# Patient Record
Sex: FEMALE | Race: WHITE | Employment: OTHER | URBAN - METROPOLITAN AREA
[De-identification: names, ages, dates, MRNs, and addresses within clinical notes are randomized per-mention and may not be internally consistent; named-entity substitution may affect disease eponyms.]

---

## 2019-09-07 ENCOUNTER — APPOINTMENT (OUTPATIENT)
Dept: GENERAL RADIOLOGY | Age: 74
DRG: 556 | End: 2019-09-07
Payer: MEDICARE

## 2019-09-07 ENCOUNTER — APPOINTMENT (OUTPATIENT)
Dept: CT IMAGING | Age: 74
DRG: 556 | End: 2019-09-07
Payer: MEDICARE

## 2019-09-07 ENCOUNTER — HOSPITAL ENCOUNTER (INPATIENT)
Age: 74
LOS: 3 days | Discharge: NURSING, REHAB OR LONG-TERM CARE | DRG: 556 | End: 2019-09-10
Attending: EMERGENCY MEDICINE | Admitting: INTERNAL MEDICINE
Payer: MEDICARE
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
## 2019-09-07 DIAGNOSIS — N39.0 URINARY TRACT INFECTION WITHOUT HEMATURIA, SITE UNSPECIFIED: ICD-10-CM

## 2019-09-07 DIAGNOSIS — M25.551 RIGHT HIP PAIN: ICD-10-CM
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
## 2019-09-07 LAB
A/G RATIO: 1.2 (ref 1.1–2.2)
ALBUMIN SERPL-MCNC: 4.4 G/DL (ref 3.4–5)
ALP BLD-CCNC: 109 U/L (ref 40–129)
ALT SERPL-CCNC: 12 U/L (ref 10–40)
ANION GAP SERPL CALCULATED.3IONS-SCNC: 10 MMOL/L (ref 3–16)
AST SERPL-CCNC: 13 U/L (ref 15–37)
BASOPHILS ABSOLUTE: 0.1 K/UL (ref 0–0.2)
BASOPHILS RELATIVE PERCENT: 0.7 %
BILIRUB SERPL-MCNC: 0.4 MG/DL (ref 0–1)
BILIRUBIN URINE: ABNORMAL
BLOOD, URINE: ABNORMAL
BUN BLDV-MCNC: 17 MG/DL (ref 7–20)
CALCIUM SERPL-MCNC: 9.9 MG/DL (ref 8.3–10.6)
CHLORIDE BLD-SCNC: 101 MMOL/L (ref 99–110)
CLARITY: ABNORMAL
CO2: 28 MMOL/L (ref 21–32)
COLOR: ABNORMAL
COMMENT UA: ABNORMAL
CREAT SERPL-MCNC: 0.8 MG/DL (ref 0.6–1.2)
EOSINOPHILS ABSOLUTE: 0 K/UL (ref 0–0.6)
EOSINOPHILS RELATIVE PERCENT: 0.4 %
GFR AFRICAN AMERICAN: >60
GFR NON-AFRICAN AMERICAN: >60
GLOBULIN: 3.6 G/DL
GLUCOSE BLD-MCNC: 138 MG/DL (ref 70–99)
GLUCOSE URINE: 100 MG/DL
HCT VFR BLD CALC: 39 % (ref 36–48)
HEMOGLOBIN: 13 G/DL (ref 12–16)
KETONES, URINE: 15 MG/DL
LEUKOCYTE ESTERASE, URINE: ABNORMAL
LYMPHOCYTES ABSOLUTE: 1 K/UL (ref 1–5.1)
LYMPHOCYTES RELATIVE PERCENT: 11.6 %
MCH RBC QN AUTO: 31.2 PG (ref 26–34)
MCHC RBC AUTO-ENTMCNC: 33.3 G/DL (ref 31–36)
MCV RBC AUTO: 93.6 FL (ref 80–100)
MICROSCOPIC EXAMINATION: YES
MONOCYTES ABSOLUTE: 0.4 K/UL (ref 0–1.3)
MONOCYTES RELATIVE PERCENT: 4.2 %
NEUTROPHILS ABSOLUTE: 7.2 K/UL (ref 1.7–7.7)
NEUTROPHILS RELATIVE PERCENT: 83.1 %
NITRITE, URINE: POSITIVE
PDW BLD-RTO: 14.1 % (ref 12.4–15.4)
PH UA: 6.5 (ref 5–8)
PLATELET # BLD: 239 K/UL (ref 135–450)
PMV BLD AUTO: 8.3 FL (ref 5–10.5)
POTASSIUM SERPL-SCNC: 4 MMOL/L (ref 3.5–5.1)
PROTEIN UA: >=300 MG/DL
RBC # BLD: 4.17 M/UL (ref 4–5.2)
RBC UA: >100 /HPF (ref 0–2)
SODIUM BLD-SCNC: 139 MMOL/L (ref 136–145)
SPECIFIC GRAVITY UA: 1.01 (ref 1–1.03)
TOTAL PROTEIN: 8 G/DL (ref 6.4–8.2)
URINE TYPE: ABNORMAL
UROBILINOGEN, URINE: 4 E.U./DL
WBC # BLD: 8.7 K/UL (ref 4–11)
WBC UA: ABNORMAL /HPF (ref 0–5)

## 2019-09-07 PROCEDURE — 6370000000 HC RX 637 (ALT 250 FOR IP): Performed by: INTERNAL MEDICINE

## 2019-09-07 PROCEDURE — 99285 EMERGENCY DEPT VISIT HI MDM: CPT

## 2019-09-07 PROCEDURE — 1200000000 HC SEMI PRIVATE

## 2019-09-07 PROCEDURE — 2580000003 HC RX 258: Performed by: EMERGENCY MEDICINE

## 2019-09-07 PROCEDURE — 96365 THER/PROPH/DIAG IV INF INIT: CPT

## 2019-09-07 PROCEDURE — 80053 COMPREHEN METABOLIC PANEL: CPT

## 2019-09-07 PROCEDURE — 6370000000 HC RX 637 (ALT 250 FOR IP): Performed by: EMERGENCY MEDICINE

## 2019-09-07 PROCEDURE — 73502 X-RAY EXAM HIP UNI 2-3 VIEWS: CPT

## 2019-09-07 PROCEDURE — 85025 COMPLETE CBC W/AUTO DIFF WBC: CPT

## 2019-09-07 PROCEDURE — 6360000002 HC RX W HCPCS: Performed by: EMERGENCY MEDICINE

## 2019-09-07 PROCEDURE — 71045 X-RAY EXAM CHEST 1 VIEW: CPT

## 2019-09-07 PROCEDURE — 71046 X-RAY EXAM CHEST 2 VIEWS: CPT

## 2019-09-07 PROCEDURE — 74176 CT ABD & PELVIS W/O CONTRAST: CPT

## 2019-09-07 PROCEDURE — 81001 URINALYSIS AUTO W/SCOPE: CPT

## 2019-09-07 PROCEDURE — 2580000003 HC RX 258: Performed by: INTERNAL MEDICINE

## 2019-09-07 RX ORDER — MELOXICAM 15 MG/1
15 TABLET ORAL DAILY
COMMUNITY
End: 2019-10-01

## 2019-09-07 RX ORDER — HYDROCODONE BITARTRATE AND ACETAMINOPHEN 5; 325 MG/1; MG/1
1 TABLET ORAL ONCE
Status: COMPLETED | OUTPATIENT
Start: 2019-09-07 | End: 2019-09-07

## 2019-09-07 RX ORDER — HYDROCODONE BITARTRATE AND ACETAMINOPHEN 5; 325 MG/1; MG/1
1 TABLET ORAL EVERY 6 HOURS PRN
Status: DISCONTINUED | OUTPATIENT
Start: 2019-09-07 | End: 2019-09-10 | Stop reason: HOSPADM

## 2019-09-07 RX ORDER — FOLIC ACID/MULTIVIT,IRON,MINER .4-18-35
1 TABLET,CHEWABLE ORAL DAILY
COMMUNITY

## 2019-09-07 RX ORDER — ACETAMINOPHEN 650 MG/1
650 SUPPOSITORY RECTAL EVERY 4 HOURS PRN
Status: DISCONTINUED | OUTPATIENT
Start: 2019-09-07 | End: 2019-09-07

## 2019-09-07 RX ORDER — ONDANSETRON 2 MG/ML
4 INJECTION INTRAMUSCULAR; INTRAVENOUS EVERY 6 HOURS PRN
Status: DISCONTINUED | OUTPATIENT
Start: 2019-09-07 | End: 2019-09-10 | Stop reason: HOSPADM

## 2019-09-07 RX ORDER — ACETAMINOPHEN 325 MG/1
650 TABLET ORAL EVERY 4 HOURS PRN
Status: DISCONTINUED | OUTPATIENT
Start: 2019-09-07 | End: 2019-09-10 | Stop reason: HOSPADM

## 2019-09-07 RX ORDER — SODIUM CHLORIDE 0.9 % (FLUSH) 0.9 %
10 SYRINGE (ML) INJECTION EVERY 12 HOURS SCHEDULED
Status: DISCONTINUED | OUTPATIENT
Start: 2019-09-07 | End: 2019-09-10 | Stop reason: HOSPADM

## 2019-09-07 RX ORDER — FAMOTIDINE 20 MG/1
20 TABLET, FILM COATED ORAL 2 TIMES DAILY
Status: DISCONTINUED | OUTPATIENT
Start: 2019-09-07 | End: 2019-09-10 | Stop reason: HOSPADM

## 2019-09-07 RX ORDER — SODIUM CHLORIDE 9 MG/ML
INJECTION, SOLUTION INTRAVENOUS CONTINUOUS
Status: DISCONTINUED | OUTPATIENT
Start: 2019-09-07 | End: 2019-09-08

## 2019-09-07 RX ORDER — ACETAMINOPHEN 650 MG/1
650 SUPPOSITORY RECTAL EVERY 4 HOURS PRN
Status: ON HOLD | COMMUNITY
End: 2019-10-11 | Stop reason: HOSPADM

## 2019-09-07 RX ORDER — GABAPENTIN 100 MG/1
100 CAPSULE ORAL 2 TIMES DAILY PRN
Status: ON HOLD | COMMUNITY
End: 2020-02-18 | Stop reason: ALTCHOICE

## 2019-09-07 RX ORDER — PHENAZOPYRIDINE HYDROCHLORIDE 100 MG/1
200 TABLET, FILM COATED ORAL ONCE
Status: COMPLETED | OUTPATIENT
Start: 2019-09-07 | End: 2019-09-07

## 2019-09-07 RX ORDER — SODIUM CHLORIDE 0.9 % (FLUSH) 0.9 %
10 SYRINGE (ML) INJECTION PRN
Status: DISCONTINUED | OUTPATIENT
Start: 2019-09-07 | End: 2019-09-10 | Stop reason: HOSPADM

## 2019-09-07 RX ORDER — GABAPENTIN 100 MG/1
100 CAPSULE ORAL 2 TIMES DAILY
Status: DISCONTINUED | OUTPATIENT
Start: 2019-09-07 | End: 2019-09-10 | Stop reason: HOSPADM

## 2019-09-07 RX ORDER — SILICONE ADHESIVE 1.5" X 3"
400 SHEET (EA) TOPICAL
COMMUNITY
End: 2020-07-10

## 2019-09-07 RX ADMIN — SODIUM CHLORIDE: 9 INJECTION, SOLUTION INTRAVENOUS at 17:04

## 2019-09-07 RX ADMIN — GABAPENTIN 100 MG: 100 CAPSULE ORAL at 20:45

## 2019-09-07 RX ADMIN — CEFTRIAXONE SODIUM 1 G: 1 INJECTION, POWDER, FOR SOLUTION INTRAMUSCULAR; INTRAVENOUS at 13:51

## 2019-09-07 RX ADMIN — HYDROCODONE BITARTRATE AND ACETAMINOPHEN 1 TABLET: 5; 325 TABLET ORAL at 17:04

## 2019-09-07 RX ADMIN — HYDROCODONE BITARTRATE AND ACETAMINOPHEN 1 TABLET: 5; 325 TABLET ORAL at 13:51

## 2019-09-07 RX ADMIN — FAMOTIDINE 20 MG: 20 TABLET ORAL at 20:45

## 2019-09-07 RX ADMIN — HYDROCODONE BITARTRATE AND ACETAMINOPHEN 1 TABLET: 5; 325 TABLET ORAL at 23:48

## 2019-09-07 RX ADMIN — PHENAZOPYRIDINE 200 MG: 100 TABLET ORAL at 13:51

## 2019-09-07 RX ADMIN — Medication 10 ML: at 20:46

## 2019-09-07 SDOH — HEALTH STABILITY: MENTAL HEALTH: HOW OFTEN DO YOU HAVE A DRINK CONTAINING ALCOHOL?: NEVER

## 2019-09-07 ASSESSMENT — PAIN SCALES - GENERAL
PAINLEVEL_OUTOF10: 7
PAINLEVEL_OUTOF10: 4
PAINLEVEL_OUTOF10: 5
PAINLEVEL_OUTOF10: 4
PAINLEVEL_OUTOF10: 5

## 2019-09-07 ASSESSMENT — PAIN DESCRIPTION - LOCATION: LOCATION: HIP;LEG

## 2019-09-07 ASSESSMENT — PAIN DESCRIPTION - ORIENTATION: ORIENTATION: RIGHT

## 2019-09-07 NOTE — ED PROVIDER NOTES
CHIEF COMPLAINT  Dysuria (Started yesterday, increased frequency and burning with urination. ) and Fall (x2 days ago fell onto right side. C/o right hip pain. )      HISTORY OF PRESENT ILLNESS  Kapil Link is a 68 y.o. female with a history of DVT, and neuropathy who presents to the ED complaining of dysuria. Patient reports that she has had dysuria over the last several days and believes this to be the reason for her fall yesterday. Patient states that she fell landing on her right hip. Since that time she has had significant difficulty in even transferring weight in spite of using a walker which she uses chronically. Patient denies any head injury, or loss of consciousness. .   No other complaints, modifying factors or associated symptoms. I have reviewed the following from the nursing documentation. Past Medical History:   Diagnosis Date    [facility]T) ([facility])     Degenerative disc disease, lumbar     Neuropathy      History reviewed. No pertinent surgical history. History reviewed. No pertinent family history. Social History     Socioeconomic History    Marital status:       Spouse name: Not on file    Number of children: Not on file    Years of education: Not on file    Highest education level: Not on file   Occupational History    Not on file   Social Needs    Financial resource strain: Not on file    Food insecurity:     Worry: Not on file     Inability: Not on file    Transportation needs:     Medical: Not on file     Non-medical: Not on file   Tobacco Use    Smoking status: Never Smoker    Smokeless tobacco: Never Used   Substance and Sexual Activity    Alcohol use: Never     Frequency: Never    Drug use: Not on file    Sexual activity: Not on file   Lifestyle    Physical activity:     Days per week: Not on file     Minutes per session: Not on file    Stress: Not on file   Relationships    Social connections:     Talks on phone: Not on file     Gets

## 2019-09-08 LAB
ANION GAP SERPL CALCULATED.3IONS-SCNC: 7 MMOL/L (ref 3–16)
BASOPHILS ABSOLUTE: 0 K/UL (ref 0–0.2)
BASOPHILS RELATIVE PERCENT: 0.7 %
BUN BLDV-MCNC: 21 MG/DL (ref 7–20)
CALCIUM SERPL-MCNC: 9.3 MG/DL (ref 8.3–10.6)
CHLORIDE BLD-SCNC: 106 MMOL/L (ref 99–110)
CO2: 26 MMOL/L (ref 21–32)
CREAT SERPL-MCNC: 0.9 MG/DL (ref 0.6–1.2)
EOSINOPHILS ABSOLUTE: 0.1 K/UL (ref 0–0.6)
EOSINOPHILS RELATIVE PERCENT: 1.5 %
GFR AFRICAN AMERICAN: >60
GFR NON-AFRICAN AMERICAN: >60
GLUCOSE BLD-MCNC: 111 MG/DL (ref 70–99)
HCT VFR BLD CALC: 33.2 % (ref 36–48)
HEMOGLOBIN: 11.2 G/DL (ref 12–16)
LYMPHOCYTES ABSOLUTE: 2 K/UL (ref 1–5.1)
LYMPHOCYTES RELATIVE PERCENT: 27.1 %
MCH RBC QN AUTO: 31.7 PG (ref 26–34)
MCHC RBC AUTO-ENTMCNC: 33.7 G/DL (ref 31–36)
MCV RBC AUTO: 94.2 FL (ref 80–100)
MONOCYTES ABSOLUTE: 0.6 K/UL (ref 0–1.3)
MONOCYTES RELATIVE PERCENT: 7.5 %
NEUTROPHILS ABSOLUTE: 4.7 K/UL (ref 1.7–7.7)
NEUTROPHILS RELATIVE PERCENT: 63.2 %
PDW BLD-RTO: 14.3 % (ref 12.4–15.4)
PLATELET # BLD: 197 K/UL (ref 135–450)
PMV BLD AUTO: 8.5 FL (ref 5–10.5)
POTASSIUM REFLEX MAGNESIUM: 4.2 MMOL/L (ref 3.5–5.1)
RBC # BLD: 3.53 M/UL (ref 4–5.2)
SODIUM BLD-SCNC: 139 MMOL/L (ref 136–145)
WBC # BLD: 7.4 K/UL (ref 4–11)

## 2019-09-08 PROCEDURE — 80048 BASIC METABOLIC PNL TOTAL CA: CPT

## 2019-09-08 PROCEDURE — 97535 SELF CARE MNGMENT TRAINING: CPT

## 2019-09-08 PROCEDURE — 96375 TX/PRO/DX INJ NEW DRUG ADDON: CPT

## 2019-09-08 PROCEDURE — 2580000003 HC RX 258: Performed by: INTERNAL MEDICINE

## 2019-09-08 PROCEDURE — 36415 COLL VENOUS BLD VENIPUNCTURE: CPT

## 2019-09-08 PROCEDURE — 6360000002 HC RX W HCPCS: Performed by: INTERNAL MEDICINE

## 2019-09-08 PROCEDURE — G0378 HOSPITAL OBSERVATION PER HR: HCPCS

## 2019-09-08 PROCEDURE — 97530 THERAPEUTIC ACTIVITIES: CPT

## 2019-09-08 PROCEDURE — 85025 COMPLETE CBC W/AUTO DIFF WBC: CPT

## 2019-09-08 PROCEDURE — 97110 THERAPEUTIC EXERCISES: CPT

## 2019-09-08 PROCEDURE — 6370000000 HC RX 637 (ALT 250 FOR IP): Performed by: NURSE PRACTITIONER

## 2019-09-08 PROCEDURE — 1200000000 HC SEMI PRIVATE

## 2019-09-08 PROCEDURE — 96366 THER/PROPH/DIAG IV INF ADDON: CPT

## 2019-09-08 PROCEDURE — 97161 PT EVAL LOW COMPLEX 20 MIN: CPT

## 2019-09-08 PROCEDURE — 6370000000 HC RX 637 (ALT 250 FOR IP): Performed by: INTERNAL MEDICINE

## 2019-09-08 PROCEDURE — 97166 OT EVAL MOD COMPLEX 45 MIN: CPT

## 2019-09-08 RX ORDER — TRAMADOL HYDROCHLORIDE 50 MG/1
50 TABLET ORAL EVERY 6 HOURS PRN
Status: DISCONTINUED | OUTPATIENT
Start: 2019-09-08 | End: 2019-09-10 | Stop reason: HOSPADM

## 2019-09-08 RX ORDER — PHENAZOPYRIDINE HYDROCHLORIDE 100 MG/1
200 TABLET, FILM COATED ORAL
Status: DISCONTINUED | OUTPATIENT
Start: 2019-09-08 | End: 2019-09-10 | Stop reason: HOSPADM

## 2019-09-08 RX ADMIN — APIXABAN 5 MG: 5 TABLET, FILM COATED ORAL at 21:16

## 2019-09-08 RX ADMIN — GABAPENTIN 100 MG: 100 CAPSULE ORAL at 09:19

## 2019-09-08 RX ADMIN — HYDROCODONE BITARTRATE AND ACETAMINOPHEN 1 TABLET: 5; 325 TABLET ORAL at 15:00

## 2019-09-08 RX ADMIN — ONDANSETRON 4 MG: 2 INJECTION INTRAMUSCULAR; INTRAVENOUS at 09:19

## 2019-09-08 RX ADMIN — HYDROCODONE BITARTRATE AND ACETAMINOPHEN 1 TABLET: 5; 325 TABLET ORAL at 09:38

## 2019-09-08 RX ADMIN — HYDROCODONE BITARTRATE AND ACETAMINOPHEN 1 TABLET: 5; 325 TABLET ORAL at 21:16

## 2019-09-08 RX ADMIN — FAMOTIDINE 20 MG: 20 TABLET ORAL at 21:16

## 2019-09-08 RX ADMIN — PHENAZOPYRIDINE HYDROCHLORIDE 200 MG: 100 TABLET ORAL at 17:47

## 2019-09-08 RX ADMIN — ACETAMINOPHEN 650 MG: 325 TABLET ORAL at 03:05

## 2019-09-08 RX ADMIN — Medication 10 ML: at 21:17

## 2019-09-08 RX ADMIN — GABAPENTIN 100 MG: 100 CAPSULE ORAL at 21:16

## 2019-09-08 RX ADMIN — FAMOTIDINE 20 MG: 20 TABLET ORAL at 09:19

## 2019-09-08 RX ADMIN — CEFTRIAXONE SODIUM 1 G: 1 INJECTION, POWDER, FOR SOLUTION INTRAMUSCULAR; INTRAVENOUS at 13:45

## 2019-09-08 RX ADMIN — APIXABAN 5 MG: 5 TABLET, FILM COATED ORAL at 15:00

## 2019-09-08 ASSESSMENT — PAIN SCALES - GENERAL
PAINLEVEL_OUTOF10: 8
PAINLEVEL_OUTOF10: 7
PAINLEVEL_OUTOF10: 5
PAINLEVEL_OUTOF10: 7
PAINLEVEL_OUTOF10: 8

## 2019-09-08 ASSESSMENT — PAIN DESCRIPTION - LOCATION: LOCATION: HIP;LEG

## 2019-09-08 ASSESSMENT — PAIN DESCRIPTION - ORIENTATION: ORIENTATION: RIGHT

## 2019-09-08 NOTE — PROGRESS NOTES
Refill: Brisk,< 3 seconds   Peripheral Pulses: +2 palpable, equal bilaterally       Labs:   Recent Labs     09/07/19  1016 09/08/19  0742   WBC 8.7 7.4   HGB 13.0 11.2*   HCT 39.0 33.2*    197     Recent Labs     09/07/19  1016 09/08/19  0742    139   K 4.0 4.2    106   CO2 28 26   BUN 17 21*   CREATININE 0.8 0.9   CALCIUM 9.9 9.3     Recent Labs     09/07/19  1016   AST 13*   ALT 12   BILITOT 0.4   ALKPHOS 109     No results for input(s): INR in the last 72 hours. No results for input(s): Coralyn Honour in the last 72 hours. Urinalysis:      Lab Results   Component Value Date    NITRU POSITIVE 09/07/2019    WBCUA see below 09/07/2019    RBCUA >100 09/07/2019    BLOODU LARGE 09/07/2019    SPECGRAV 1.015 09/07/2019    GLUCOSEU 100 09/07/2019       Radiology:  XR CHEST PORTABLE   Final Result   No acute cardiopulmonary disease. CT ABDOMEN PELVIS WO CONTRAST Additional Contrast? None   Final Result   1. No acute abnormality. XR HIP 2-3 VW W PELVIS RIGHT   Final Result   Linear band of sclerosis traversing the right femoral neck which could   represent a nondisplaced femoral neck fracture. Consider MRI for further   evaluation. Severe bilateral hip osteoarthrosis and/or AVN, worse the right. Assessment/Plan:    Active Hospital Problems    Diagnosis    Right hip pain [M25.551]     Priority: High    UTI (urinary tract infection) [N39.0]    Fall [W19. XXXA]     Right hip pain, CT abdomen pelvis and x-ray right hip unremarkable with trace suspicious for possible undisplaced hip fracture, admit to MedSurg, bedrest, PRN pain medication with morphine, orthopedic surgery consulted. Input from orthopedic surgery noted, no fracture, plan for conservative management.     UTI, urine culture pending, started on ceftriaxone, monitor.     History of DVT, patient is on Eliquis, continue same.     DVT Prophylaxis: Lovenox  Diet: DIET GENERAL;  Code Status: Full

## 2019-09-08 NOTE — PROGRESS NOTES
Activity Restriction  Other position/activity restrictions: High fall risk per nursing assessment,   Vision/Hearing  Vision: Impaired  Vision Exceptions: Wears glasses at all times  Hearing: Within functional limits     Subjective  General  Chart Reviewed: Yes  Patient assessed for rehabilitation services?: Yes  Response To Previous Treatment: Not applicable  Family / Caregiver Present: No  Referring Practitioner: Ismael Mcburney, DO  Referral Date : 09/08/19  Follows Commands: Within Functional Limits  General Comment  Comments: cleared by nursing  Subjective  Subjective: Pt resting in bed. Reports 7/10 R hip/groin pain. Just received pain meds from RN   Pain Screening  Patient Currently in Pain: Yes     Pre Treatment Pain Screening  Intervention List: Patient able to continue with treatment    Orientation  Orientation  Overall Orientation Status: Within Normal Limits  Social/Functional History  Social/Functional History  Lives With: (sister (works))  Type of Home: House  Home Layout: Two level, Bed/Bath upstairs, 1/2 bath on main level(stair lift)  Home Access: Stairs to enter with rails  Entrance Stairs - Number of Steps: 4  Entrance Stairs - Rails: Both  Bathroom Shower/Tub: Walk-in shower  Bathroom Toilet: Standard  Bathroom Equipment: Shower chair  Home Equipment: Lift chair, 4 wheeled walker  ADL Assistance: Needs assistance(sister assist wtih showering, shoes and socks)  Homemaking Assistance: Needs assistance(sister does laundry. Pt able to fix a meal)  Ambulation Assistance: Independent(with [id number].  (Pt reports multiple falls))  Active : No  Occupation: Retired  Leisure & Hobbies: dancing  Cognition        Objective          PROM RLE (degrees)  RLE PROM: WFL  AROM RLE (degrees)  RLE AROM: WFL  PROM LLE (degrees)  LLE PROM: WFL  AROM LLE (degrees)  LLE AROM : WFL  Strength RLE  Comment: 3/5  Strength LLE  Comment: 3/5  Tone RLE  RLE Tone: Normotonic  Tone LLE  LLE Tone: Normotonic  Sensation  Overall Sensation Status: WFL(pt reports neuropathy)  Bed mobility  Supine to Sit: Contact guard assistance  Sit to Supine: Unable to assess  Transfers  Sit to Stand: Minimal Assistance  Stand to sit: Minimal Assistance  Ambulation  Ambulation?: Yes  More Ambulation?: No  Ambulation 1  Surface: level tile  Device: Standard Walker  Assistance: Contact guard assistance;Minimal assistance(min assist for walker management and cues)  Quality of Gait: significantly small step length, valgus with external rotation R>L. Gait Deviations: Decreased step length;Decreased step height  Distance: 10' x 2  Comments: limited by pain   Stairs/Curb  Stairs?: No     Balance  Posture: Fair  Sitting - Static: Good  Sitting - Dynamic: Good  Standing - Static: Fair  Standing - Dynamic: Fair;-        Plan   Plan  Times per week: 3-5x/week   Times per day: Daily  Current Treatment Recommendations: Strengthening, Balance Training, Functional Mobility Training, Endurance Training, Transfer Training, Gait Training, Pain Management, Home Exercise Program, Positioning  Safety Devices  Type of devices:  All fall risk precautions in place, Call light within reach, Gait belt, Patient at risk for falls, Nurse notified, Chair alarm in place, Left in chair  Restraints  Initially in place: No      AM-PAC Score  AM-PAC Inpatient Mobility Raw Score : 15 (09/08/19 1105)  AM-PAC Inpatient T-Scale Score : 39.45 (09/08/19 1105)  Mobility Inpatient CMS 0-100% Score: 57.7 (09/08/19 1105)  Mobility Inpatient CMS G-Code Modifier : CK (09/08/19 1105)          Goals  Short term goals  Time Frame for Short term goals: 9/12  Short term goal 1: Pt will complete all transfers with with Mod I  Short term goal 2: Pt will ambulate household distances (~50') with SW with Mod I  Short term goal 3: Pt will complete B LE exercises to improve strength for functional mobility by 9/10  Patient Goals   Patient goals : to be indep       Therapy Time   Individual Concurrent Group

## 2019-09-08 NOTE — PROGRESS NOTES
Occupational Therapy   Occupational Therapy Initial Assessment  Date: 2019   Patient Name: Irma Medina  MRN: [id number]     : 1945    Date of Service: 2019    Discharge Recommendations:  Subacute/Skilled Nursing Facility       Assessment   Performance deficits / Impairments: Decreased functional mobility ; Decreased ADL status; Decreased safe awareness;Decreased balance;Decreased endurance;Decreased high-level IADLs  Assessment: pt normally independent with basic ADL's & mobility in home with 4 WW, not requiring max assist with LE self care &  min assist with use of std. walker, with pain; pt to benefit from skilled OT services while in acute care setting   OT Education: OT Role;IADL Safety  REQUIRES OT FOLLOW UP: Yes  Activity Tolerance  Activity Tolerance: Patient Tolerated treatment well  Safety Devices  Safety Devices in place: Yes  Type of devices: Call light within reach; Chair alarm in place; Left in chair;Nurse notified           Patient Diagnosis(es): The primary encounter diagnosis was Fall on same level from tripping as cause of accidental injury. Diagnoses of Right hip pain and Urinary tract infection without hematuria, site unspecified were also pertinent to this visit. has a past medical history of Deep vein thrombosis (DVT) ([facility] [address] Degenerative disc disease, lumbar, and Neuropathy. has no past surgical history on file.            Restrictions  Restrictions/Precautions  Restrictions/Precautions: Weight Bearing, General Precautions, Fall Risk  Lower Extremity Weight Bearing Restrictions  Right Lower Extremity Weight Bearing: Weight Bearing As Tolerated  Position Activity Restriction  Other position/activity restrictions: High fall risk per nursing assessment,     Subjective   General  Chart Reviewed: Yes  Patient assessed for rehabilitation services?: Yes  Family / Caregiver Present: No  Referring Practitioner: Dr. Rosemarie Du  Diagnosis: Right hip pain s/p fall , severe OA & AVN right hip cues for safety, increased time to complete     Vision - Basic Assessment  Prior Vision: Wears glasses all the time     Cognition  Overall Cognitive Status: Exceptions(pleasant, cooperative at this time)  Arousal/Alertness: Appropriate responses to stimuli  Following Commands:  Follows one step commands consistently  Attention Span: Attends with cues to redirect  Memory: Decreased recall of precautions  Safety Judgement: Decreased awareness of need for assistance;Decreased awareness of need for safety  Insights: Decreased awareness of deficits  Initiation: Requires cues for some  Sequencing: Does not require cues        Sensation  Overall Sensation Status: WFL(pt reports neuropathy)         Plan   Plan  Times per week: 3-5x/ week   Current Treatment Recommendations: Strengthening, ROM, Balance Training, Functional Mobility Training, Safety Education & Training, Self-Care / ADL    AM-PAC Score        AM-PAC Inpatient Daily Activity Raw Score: 14 (09/08/19 1105)  AM-PAC Inpatient ADL T-Scale Score : 33.39 (09/08/19 1105)  ADL Inpatient CMS 0-100% Score: 59.67 (09/08/19 1105)  ADL Inpatient CMS G-Code Modifier : CK (09/08/19 1105)    Goals  Short term goals  Time Frame for Short term goals: 1 week  Short term goal 1: SBA with bathroom mobility by 9-15-19  Short term goal 2: min assist with LE dressing with AE PRN  Short term goal 3: SBA standing ADL's for 5 minutes  Short term goal 4: supervision with sit<-->stand transfers  Patient Goals   Patient goals : go home when able to get around easier       Therapy Time   Individual Concurrent Group Co-treatment   Time In [address] [place] [address]         Time Out [id number] [address] Garcia         Minutes [address] [place], [place]

## 2019-09-08 NOTE — FLOWSHEET NOTE
Support System Family members   Continue Visiting Yes  (9/9 pt dealing with infection, multiple family stressors)   Complexity of Encounter High   Length of Encounter 15 minutes;1 hour   Spiritual Assessment Completed Yes   Grief and Life Adjustment   Type Grief and loss; Adjustment to illness   Assessment Approachable;Tearful; Anxious; Fearful; Angry; Loneliness; Helplessness   Intervention Active listening;Explored feelings, thoughts, concerns;Explored coping resources;Nurtured hope;Prayer;Scripture;Sustaining presence/ Ministry of presence;Grief care; Discussed relationship with God;Discussed illness/injury and it's impact   Outcome Comfort;Expressed gratitude;Engaged in conversation; Shared life review;Expressed feelings/needs/concerns; Less anxious, less agitated; De-escalated

## 2019-09-09 PROCEDURE — 2580000003 HC RX 258: Performed by: INTERNAL MEDICINE

## 2019-09-09 PROCEDURE — 97110 THERAPEUTIC EXERCISES: CPT

## 2019-09-09 PROCEDURE — 97116 GAIT TRAINING THERAPY: CPT

## 2019-09-09 PROCEDURE — 6370000000 HC RX 637 (ALT 250 FOR IP): Performed by: INTERNAL MEDICINE

## 2019-09-09 PROCEDURE — 1200000000 HC SEMI PRIVATE

## 2019-09-09 PROCEDURE — 97535 SELF CARE MNGMENT TRAINING: CPT

## 2019-09-09 PROCEDURE — 96366 THER/PROPH/DIAG IV INF ADDON: CPT

## 2019-09-09 PROCEDURE — APPNB30 APP NON BILLABLE TIME 0-30 MINS: Performed by: PHYSICIAN ASSISTANT

## 2019-09-09 PROCEDURE — 6360000002 HC RX W HCPCS: Performed by: INTERNAL MEDICINE

## 2019-09-09 RX ORDER — HYDROCODONE BITARTRATE AND ACETAMINOPHEN 5; 325 MG/1; MG/1
1 TABLET ORAL EVERY 6 HOURS PRN
Qty: 12 TABLET | Refills: 0 | Status: SHIPPED | OUTPATIENT
Start: 2019-09-09 | End: 2019-09-12

## 2019-09-09 RX ORDER — PHENAZOPYRIDINE HYDROCHLORIDE 200 MG/1
200 TABLET, FILM COATED ORAL
Qty: 9 TABLET | Refills: 0 | Status: SHIPPED | OUTPATIENT
Start: 2019-09-09 | End: 2019-09-12

## 2019-09-09 RX ORDER — TRAMADOL HYDROCHLORIDE 50 MG/1
50 TABLET ORAL EVERY 6 HOURS PRN
Qty: 12 TABLET | Refills: 0 | Status: SHIPPED | OUTPATIENT
Start: 2019-09-09 | End: 2019-09-12

## 2019-09-09 RX ORDER — CALCIUM CARBONATE 200(500)MG
500 TABLET,CHEWABLE ORAL 3 TIMES DAILY PRN
Status: DISCONTINUED | OUTPATIENT
Start: 2019-09-09 | End: 2019-09-10 | Stop reason: HOSPADM

## 2019-09-09 RX ORDER — CIPROFLOXACIN 500 MG/1
500 TABLET, FILM COATED ORAL 2 TIMES DAILY
Qty: 14 TABLET | Refills: 0 | Status: SHIPPED | OUTPATIENT
Start: 2019-09-09 | End: 2019-09-16

## 2019-09-09 RX ADMIN — Medication 10 ML: at 20:02

## 2019-09-09 RX ADMIN — GABAPENTIN 100 MG: 100 CAPSULE ORAL at 20:07

## 2019-09-09 RX ADMIN — APIXABAN 5 MG: 5 TABLET, FILM COATED ORAL at 20:07

## 2019-09-09 RX ADMIN — PHENAZOPYRIDINE HYDROCHLORIDE 200 MG: 100 TABLET ORAL at 08:36

## 2019-09-09 RX ADMIN — FAMOTIDINE 20 MG: 20 TABLET ORAL at 20:07

## 2019-09-09 RX ADMIN — GABAPENTIN 100 MG: 100 CAPSULE ORAL at 08:36

## 2019-09-09 RX ADMIN — PHENAZOPYRIDINE HYDROCHLORIDE 200 MG: 100 TABLET ORAL at 12:22

## 2019-09-09 RX ADMIN — Medication 10 ML: at 08:38

## 2019-09-09 RX ADMIN — PHENAZOPYRIDINE HYDROCHLORIDE 200 MG: 100 TABLET ORAL at 17:39

## 2019-09-09 RX ADMIN — HYDROCODONE BITARTRATE AND ACETAMINOPHEN 1 TABLET: 5; 325 TABLET ORAL at 16:10

## 2019-09-09 RX ADMIN — CEFTRIAXONE SODIUM 1 G: 1 INJECTION, POWDER, FOR SOLUTION INTRAMUSCULAR; INTRAVENOUS at 13:46

## 2019-09-09 RX ADMIN — FAMOTIDINE 20 MG: 20 TABLET ORAL at 08:36

## 2019-09-09 RX ADMIN — APIXABAN 5 MG: 5 TABLET, FILM COATED ORAL at 08:36

## 2019-09-09 ASSESSMENT — PAIN DESCRIPTION - ORIENTATION: ORIENTATION: RIGHT

## 2019-09-09 ASSESSMENT — PAIN SCALES - GENERAL
PAINLEVEL_OUTOF10: 3
PAINLEVEL_OUTOF10: 0
PAINLEVEL_OUTOF10: 7
PAINLEVEL_OUTOF10: 0
PAINLEVEL_OUTOF10: 0
PAINLEVEL_OUTOF10: 3
PAINLEVEL_OUTOF10: 0

## 2019-09-09 ASSESSMENT — PAIN DESCRIPTION - LOCATION: LOCATION: HIP

## 2019-09-09 NOTE — PROGRESS NOTES
DO  Subjective  Subjective: Pt resting in bed. Reports R hip/groin \"sore\". Agitated by conflicting reports abouther D/C  General Comment  Comments: cleared by nursing  Pain Screening  Patient Currently in Pain: Yes(\"sore\" R hip and groin)  Vital Signs  Patient Currently in Pain: Yes(\"sore\" R hip and groin)       Orientation  Orientation  Overall Orientation Status: Within Normal Limits       Objective   Bed mobility  Supine to Sit: Stand by assistance(HOB elevated and use of rails, increased effort and time )  Sit to Supine: Unable to assess  Transfers  Sit to Stand: Minimal Assistance;Contact guard assistance  Stand to sit: Contact guard assistance  Ambulation  Ambulation?: Yes  Ambulation 1  Surface: level tile  Device: Standard Walker  Assistance: Contact guard assistance  Gait Deviations: Decreased step length;Decreased step height  Distance: 60'        Exercises  Hip Flexion: seated marches X 25 BLE  Hip Abduction: X 25 BLE   Knee Long Arc Quad: X 25 BLE  Ankle Pumps: X 25 BLE           AM-PAC Score  AM-PAC Inpatient Mobility Raw Score : 17 (09/09/19 1157)  AM-PAC Inpatient T-Scale Score : 42.13 (09/09/19 1157)  Mobility Inpatient CMS 0-100% Score: 50.57 (09/09/19 1157)  Mobility Inpatient CMS G-Code Modifier : CK (09/09/19 1157)          Goals  Short term goals  Time Frame for Short term goals: 9/12  Short term goal 1: Pt will complete all transfers with with Mod I  Short term goal 2: Pt will ambulate household distances (~50') with SW with Mod I  Short term goal 3: Pt will complete B LE exercises to improve strength for functional mobility by 9/10  Patient Goals   Patient goals : to be indep    Plan    Plan  Times per week: 3-5x/week   Times per day: Daily  Current Treatment Recommendations: Strengthening, Balance Training, Functional Mobility Training, Endurance Training, Transfer Training, Gait Training, Pain Management, Home Exercise Program, Positioning  Safety Devices  Type of devices:  All fall risk

## 2019-09-09 NOTE — PROGRESS NOTES
Alert and oriented, thought content appropriate, normal insight  Capillary Refill: Brisk,< 3 seconds   Peripheral Pulses: +2 palpable, equal bilaterally       Labs:   Recent Labs     09/07/19  1016 09/08/19  0742   WBC 8.7 7.4   HGB 13.0 11.2*   HCT 39.0 33.2*    197     Recent Labs     09/07/19  1016 09/08/19  0742    139   K 4.0 4.2    106   CO2 28 26   BUN 17 21*   CREATININE 0.8 0.9   CALCIUM 9.9 9.3     Recent Labs     09/07/19  1016   AST 13*   ALT 12   BILITOT 0.4   ALKPHOS 109     No results for input(s): INR in the last 72 hours. No results for input(s): Ardyce Cane in the last 72 hours. Urinalysis:      Lab Results   Component Value Date    NITRU POSITIVE 09/07/2019    WBCUA see below 09/07/2019    RBCUA >100 09/07/2019    BLOODU LARGE 09/07/2019    SPECGRAV 1.015 09/07/2019    GLUCOSEU 100 09/07/2019       Radiology:  XR CHEST PORTABLE   Final Result   No acute cardiopulmonary disease. CT ABDOMEN PELVIS WO CONTRAST Additional Contrast? None   Final Result   1. No acute abnormality. XR HIP 2-3 VW W PELVIS RIGHT   Final Result   Linear band of sclerosis traversing the right femoral neck which could   represent a nondisplaced femoral neck fracture. Consider MRI for further   evaluation. Severe bilateral hip osteoarthrosis and/or AVN, worse the right. Assessment/Plan:    Active Hospital Problems    Diagnosis    Right hip pain [M25.551]     Priority: High    Fall on same level from tripping as cause of accidental injury [W01. 0XXA]    UTI (urinary tract infection) [N39.0]    Fall [W19. XXXA]     Right hip pain, CT abdomen pelvis and x-ray right hip unremarkable with trace suspicious for possible undisplaced hip fracture, admit to MedSurg, bedrest, PRN pain medication with morphine, orthopedic surgery consulted.   Input from orthopedic surgery noted, no fracture, plan for conservative management.     UTI, urine culture pending, started on

## 2019-09-09 NOTE — CARE COORDINATION
Per Shelli Smith in admissions at [facility], pt can admit pending precert which was initiated today. Pt is medically ready for dc, but will likely not have precert back until tomorrow. Will follow.      Arlene Graham RN

## 2019-09-10 VITALS
RESPIRATION RATE: 16 BRPM | TEMPERATURE: 99.1 F | WEIGHT: 200 LBS | HEART RATE: 76 BPM | BODY MASS INDEX: 34.15 KG/M2 | DIASTOLIC BLOOD PRESSURE: 78 MMHG | HEIGHT: 64 IN | OXYGEN SATURATION: 97 % | SYSTOLIC BLOOD PRESSURE: 156 MMHG

## 2019-09-10 PROCEDURE — 97530 THERAPEUTIC ACTIVITIES: CPT

## 2019-09-10 PROCEDURE — 97110 THERAPEUTIC EXERCISES: CPT

## 2019-09-10 PROCEDURE — 2580000003 HC RX 258: Performed by: INTERNAL MEDICINE

## 2019-09-10 PROCEDURE — 6360000002 HC RX W HCPCS: Performed by: INTERNAL MEDICINE

## 2019-09-10 PROCEDURE — 97535 SELF CARE MNGMENT TRAINING: CPT

## 2019-09-10 PROCEDURE — 96366 THER/PROPH/DIAG IV INF ADDON: CPT

## 2019-09-10 PROCEDURE — 6370000000 HC RX 637 (ALT 250 FOR IP): Performed by: INTERNAL MEDICINE

## 2019-09-10 RX ADMIN — APIXABAN 5 MG: 5 TABLET, FILM COATED ORAL at 09:43

## 2019-09-10 RX ADMIN — FAMOTIDINE 20 MG: 20 TABLET ORAL at 09:44

## 2019-09-10 RX ADMIN — HYDROCODONE BITARTRATE AND ACETAMINOPHEN 1 TABLET: 5; 325 TABLET ORAL at 10:55

## 2019-09-10 RX ADMIN — PHENAZOPYRIDINE HYDROCHLORIDE 200 MG: 100 TABLET ORAL at 12:12

## 2019-09-10 RX ADMIN — GABAPENTIN 100 MG: 100 CAPSULE ORAL at 09:43

## 2019-09-10 RX ADMIN — PHENAZOPYRIDINE HYDROCHLORIDE 200 MG: 100 TABLET ORAL at 09:43

## 2019-09-10 RX ADMIN — Medication 10 ML: at 09:44

## 2019-09-10 RX ADMIN — HYDROCODONE BITARTRATE AND ACETAMINOPHEN 1 TABLET: 5; 325 TABLET ORAL at 04:44

## 2019-09-10 RX ADMIN — CEFTRIAXONE SODIUM 1 G: 1 INJECTION, POWDER, FOR SOLUTION INTRAMUSCULAR; INTRAVENOUS at 12:59

## 2019-09-10 ASSESSMENT — PAIN SCALES - GENERAL
PAINLEVEL_OUTOF10: 1
PAINLEVEL_OUTOF10: 5
PAINLEVEL_OUTOF10: 3
PAINLEVEL_OUTOF10: 0
PAINLEVEL_OUTOF10: 5

## 2019-09-10 ASSESSMENT — PAIN DESCRIPTION - LOCATION: LOCATION: HIP

## 2019-09-10 ASSESSMENT — PAIN DESCRIPTION - ORIENTATION: ORIENTATION: RIGHT

## 2019-09-10 NOTE — PROGRESS NOTES
Recommendations: Strengthening, ROM, Balance Training, Functional Mobility Training, Safety Education & Training, Self-Care / ADL    AM-PAC Score        AM-PAC Inpatient Daily Activity Raw Score: 18 (09/10/19 1016)  AM-PAC Inpatient ADL T-Scale Score : 38.66 (09/10/19 1016)  ADL Inpatient CMS 0-100% Score: 46.65 (09/10/19 1016)  ADL Inpatient CMS G-Code Modifier : CK (09/10/19 1016)    Goals  Short term goals  Time Frame for Short term goals: 1 week  Short term goal 1: SBA with bathroom mobility by 9-15-19: STG met 9/10/19  Short term goal 2: min assist with LE dressing with AE PRN  Short term goal 3: SBA standing ADL's for 5 minutes: STG met 9/10/19  Short term goal 4: supervision with sit<-->stand transfers  Patient Goals   Patient goals : go home when able to get around easier       Therapy Time   Individual Concurrent Group Co-treatment   Time In 0921         Time Out 1000         Minutes 39         Timed Code Treatment Minutes: [address], S/OT

## 2019-09-10 NOTE — PLAN OF CARE
Problem: Falls - Risk of:  Goal: Will remain free from falls  Description  Will remain free from falls  9/10/2019 0405 by Jose Bran RN  Outcome: Ongoing  Note:   Bed in lowest position, wheels locked, 2/4 side rails up, nonskid footwear on. Bed/ chair check alarm in place, call light within reach. Pt instructed to call out when needing assistance. Pt stated understanding. Nurse will continue to monitor.      9/9/2019 2132 by Renetta Daniels RN  Outcome: Ongoing

## 2019-09-10 NOTE — DISCHARGE SUMMARY
for Pain for up to 3 days. Qty: 12 tablet, Refills: 0    Comments: Reduce doses taken as pain becomes manageable  Associated Diagnoses: Right hip pain      HYDROcodone-acetaminophen (NORCO) 5-325 MG per tablet Take 1 tablet by mouth every 6 hours as needed for Pain for up to 3 days. Qty: 12 tablet, Refills: 0    Comments: Reduce doses taken as pain becomes manageable  Associated Diagnoses: Right hip pain      phenazopyridine (PYRIDIUM) 200 MG tablet Take 1 tablet by mouth 3 times daily (with meals) for 3 days  Qty: 9 tablet, Refills: 0      ciprofloxacin (CIPRO) 500 MG tablet Take 1 tablet by mouth 2 times daily for 7 days  Qty: 14 tablet, Refills: 0              Details   apixaban (ELIQUIS) 5 MG TABS tablet Take by mouth 2 times daily      gabapentin (NEURONTIN) 100 MG capsule Take 100 mg by mouth 2 times daily as needed. meloxicam (MOBIC) 15 MG tablet Take 15 mg by mouth daily      acetaminophen (TYLENOL) 650 MG suppository Place 650 mg rectally every 4 hours as needed for Fever      Calcium Carbonate-Vitamin D (CALTRATE 600+D PO) Take by mouth      multivitamin-iron-minerals-folic acid (CENTRUM) chewable tablet Take 1 tablet by mouth daily      S-Adenosylmethionine (HARMEET-E) 400 MG TABS Take 400 mg by mouth             Time Spent on discharge is more than 30 minutes in the examination, evaluation, counseling and review of medications and discharge plan. Signed:    Mariam Beltran MD   9/10/2019      Thank you No primary care provider on file. for the opportunity to be involved in this patient's care. If you have any questions or concerns please feel free to contact me at [phone].

## 2019-09-26 ENCOUNTER — HOSPITAL ENCOUNTER (OUTPATIENT)
Age: 74
Discharge: HOME | End: 2019-09-26
Payer: MEDICARE

## 2019-09-26 ENCOUNTER — TELEPHONE (OUTPATIENT)
Dept: ORTHOPEDIC SURGERY | Age: 74
End: 2019-09-26

## 2019-09-26 ENCOUNTER — OFFICE VISIT (OUTPATIENT)
Dept: ORTHOPEDIC SURGERY | Age: 74
End: 2019-09-26
Payer: MEDICARE

## 2019-09-26 VITALS — HEIGHT: 64 IN | BODY MASS INDEX: 36.88 KG/M2 | WEIGHT: 216 LBS

## 2019-09-26 DIAGNOSIS — M16.11 PRIMARY OSTEOARTHRITIS OF RIGHT HIP: Primary | ICD-10-CM

## 2019-09-26 LAB
ALBUMIN SERPL-MCNC: 4.5 G/DL (ref 3.4–5)
APTT: 35.4 SEC (ref 26–36)
BILIRUBIN URINE: NEGATIVE
BLOOD, URINE: NEGATIVE
CLARITY: CLEAR
COLOR: YELLOW
GLUCOSE URINE: NEGATIVE MG/DL
INR BLD: 1.31 (ref 0.86–1.14)
KETONES, URINE: NEGATIVE MG/DL
LEUKOCYTE ESTERASE, URINE: NEGATIVE
MICROSCOPIC EXAMINATION: NORMAL
NITRITE, URINE: NEGATIVE
PH UA: 5 (ref 5–8)
PROTEIN UA: NEGATIVE MG/DL
PROTHROMBIN TIME: 14.9 SEC (ref 9.8–13)
SPECIFIC GRAVITY UA: 1.01 (ref 1–1.03)
TRANSFERRIN: 286 MG/DL (ref 200–360)
URINE TYPE: NORMAL
UROBILINOGEN, URINE: 0.2 E.U./DL

## 2019-09-26 PROCEDURE — G8417 CALC BMI ABV UP PARAM F/U: HCPCS | Performed by: ORTHOPAEDIC SURGERY

## 2019-09-26 PROCEDURE — 82040 ASSAY OF SERUM ALBUMIN: CPT

## 2019-09-26 PROCEDURE — 1111F DSCHRG MED/CURRENT MED MERGE: CPT | Performed by: ORTHOPAEDIC SURGERY

## 2019-09-26 PROCEDURE — G8400 PT W/DXA NO RESULTS DOC: HCPCS | Performed by: ORTHOPAEDIC SURGERY

## 2019-09-26 PROCEDURE — 84466 ASSAY OF TRANSFERRIN: CPT

## 2019-09-26 PROCEDURE — 83036 HEMOGLOBIN GLYCOSYLATED A1C: CPT

## 2019-09-26 PROCEDURE — 85730 THROMBOPLASTIN TIME PARTIAL: CPT

## 2019-09-26 PROCEDURE — 99213 OFFICE O/P EST LOW 20 MIN: CPT | Performed by: ORTHOPAEDIC SURGERY

## 2019-09-26 PROCEDURE — 36415 COLL VENOUS BLD VENIPUNCTURE: CPT

## 2019-09-26 PROCEDURE — G8427 DOCREV CUR MEDS BY ELIG CLIN: HCPCS | Performed by: ORTHOPAEDIC SURGERY

## 2019-09-26 PROCEDURE — 85610 PROTHROMBIN TIME: CPT

## 2019-09-26 PROCEDURE — 3017F COLORECTAL CA SCREEN DOC REV: CPT | Performed by: ORTHOPAEDIC SURGERY

## 2019-09-26 PROCEDURE — 4040F PNEUMOC VAC/ADMIN/RCVD: CPT | Performed by: ORTHOPAEDIC SURGERY

## 2019-09-26 PROCEDURE — 1090F PRES/ABSN URINE INCON ASSESS: CPT | Performed by: ORTHOPAEDIC SURGERY

## 2019-09-26 PROCEDURE — 1123F ACP DISCUSS/DSCN MKR DOCD: CPT | Performed by: ORTHOPAEDIC SURGERY

## 2019-09-26 PROCEDURE — 1036F TOBACCO NON-USER: CPT | Performed by: ORTHOPAEDIC SURGERY

## 2019-09-26 PROCEDURE — 81003 URINALYSIS AUTO W/O SCOPE: CPT

## 2019-09-26 PROCEDURE — 87086 URINE CULTURE/COLONY COUNT: CPT

## 2019-09-26 NOTE — LETTER
CONSENT TO SURGICAL OR MEDICAL PROCEDURE    PATIENTS NAMES: Keenan Boyle 1945  68 y.o. [phone] (home)   DATE/TIME: 9/26/2019 10:14 AM    1) I consent that Dr. Hugo Martin perform one or more surgical and or medical procedures on the above named patient at: [address] [place] [address]/[facility] to treat the condition(s) which appear indicated by the diagnostic studies already preformed. I have been told in general terms the nature and purpose of the procedure(s) and what the procedure(s) is/are expected to accomplish. They procedure(s) are as follows:  RT LATERAL TOTAL HIP MAKOPLASTY; C-ARM; CELL SAVER    2) It has been explained to me by the informing physician that during the course of any surgical or medical procedure unforeseen condition(s) may be revealed that necessitate an extension of the original procedure(s) or a different procedure(s) than set forth in Paragraph 1. I therefore consent that the above named physician perform such additional or different procedure(s) as are necessary or desirable in the exercise of his professional judgement. 3) I have been made aware by the informing physician of certain reasonably known risks that are associated with the procedure(s) set forth in Paragraph 1.  I understand the reasonably known risk to be: Including but not limited to: CVA, infection, M.I., Phlebitis, Cardiac Arrest and Pulmonary Embolism, Loss of Circulation, Nerve Injury, Delayed Healing, Recurrence, Loss of extremity/digit, R.S.D., Screw breakage, Arthritis, Pain, Swelling, Stiffness, Failure of Prothesis, Fracture, Leg length discrepancy, Wound complication/non-healing, need for further surgery and persistent pain.   4) I have also been informed by the informing physician that there are other risks, from both known and unknown causes, that are attendant to the because_________________________________________________    The undersigned represents that he or she is duly authorized to execute to this consent for and on the behalf of the above named patient.    ________________________________             __________________________________________  Witness                                                                         Parent/Spouse/Guardian/Other:_________________    Medical Record#  Insurance  Smartphone:  Yes   Or   No  Email:                 You have signed a consent to have a total joint replacement surgery performed. Before you can proceed with surgery the following things must be done. Please use this form as a checklist.      _____   Please schedule your Physical Therapy functional evaluation. _____   Please take your lab orders and get your blood work done at a [facility].    _____  Gonzalez Devika will need to follow email/text instructions for [employer] to complete registration and the medical questionnaire prior to your physical therapy evaluation. Do not schedule an appointment with your primary care physician until you have a surgery date. This pre-op exam has to be within 30 days of the surgery. _____  CT Scans will be scheduled by our office.    _____  Information about the pre-op class will be in your surgery packet that will be mailed to you after you are scheduled for surgery. Once you have completed both the labs and the Physical Therapy evaluation please call Parris Abbey @ [phone] to let her know. Once verification of the PT Evaluation and completed labs has been determined you will be called and set up for surgery. This may take 1-2 days to check results and return your phone call.  You will not be called about lab results if everything is normal.

## 2019-09-26 NOTE — PROGRESS NOTES
falls/fall history: Yes    General Exam:   Constitutional: Patient is adequately groomed with no evidence of malnutrition  DTRs: Deep tendon reflexes are intact  Mental Status: The patient is oriented to time, place and person. The patient's mood and affect are appropriate. Lymphatic: The lymphatic examination bilaterally reveals all areas to be without enlargement or induration. Vascular: Examination reveals no swelling or calf tenderness. Peripheral pulses are palpable and 2+. Neurological: The patient has good coordination. There is no weakness or sensory deficit. Right Hip Examination:    Inspection:  No erythema or signs of infection. There are no cutaneous lesions. Palpation:  Moderate tenderness to palpation over the greater trochanteric region. Range of Motion: Virtually no range of motion of the hip withwith reproducible groin pain. Strength:  Strength is limited secondary to both pain and range of motion. Special Tests: There is a positive log roll maneuver. Negative Homans test.    Skin: There are no rashes, ulcerations or lesions. Gait: Not in via wheelchair. Reflex 2+ patellar    Additional Comments:     Examination of the left hip reveals intact skin. She also has very limited range of motion of the left side with minimal pain discomfort. Crepitation of the joint is present with range of motion. Hip strength is 4/5 throughout. Additional Examinations:         Right Upper Extremity:  Examination of the right upper extremity does not show any tenderness, deformity or injury. Range of motion is unremarkable. There is no gross instability. There are no rashes, ulcerations or lesions. Strength and tone are normal.  Left Upper Extremity: Examination of the left upper extremity does not show any tenderness, deformity or injury. Range of motion is unremarkable. There is no gross instability. There are no rashes, ulcerations or lesions.   Strength and tone are understanding to the duration of the prosthesis. The patient voiced understanding to these continuum of treatment options. At this time the patient would like to go ahead and proceed with surgical intervention. We have recommended a right lateral total hip replacement with robotic assistance. The patient is aware that this will require a nondiagnostic CT scan for surgical planning with the insurance company may or may not provide coverage for. This would be an out-of-pocket expense that the patient would be financially responsible for. We discussed the risk, benefits and complications of total hip replacement arthroplasty surgery. We specifically discussed concerns including infection, deep vein thrombosis, leg length discrepancies, neurological injury, and specifically sciatic nerve injury with the possibility of footdrop or other neurological compromise. We further discussed the possibility of dislocation of the prosthesis and the precautions that are involved after total hip replacement surgery to try to avoid dislocation. We also discussed the reality of the rehabilitation process. We discussed the rehabilitation involved with total hip replacement surgery including home physical theray program as well as outpatient physical therapy. We also talked about visiting with [facility] postoperative physical therapy to regain range of motion and strength after the surgery. All questions were answered. The appropriate literature was given to the patient.   Demand matching was completed today

## 2019-09-27 ENCOUNTER — TELEPHONE (OUTPATIENT)
Dept: ORTHOPEDIC SURGERY | Age: 74
End: 2019-09-27

## 2019-09-27 DIAGNOSIS — M25.551 PAIN OF RIGHT HIP JOINT: Primary | ICD-10-CM

## 2019-09-27 LAB
ESTIMATED AVERAGE GLUCOSE: 102.5 MG/DL
HBA1C MFR BLD: 5.2 %
URINE CULTURE, ROUTINE: NORMAL

## 2019-10-01 ENCOUNTER — TELEPHONE (OUTPATIENT)
Dept: ORTHOPEDIC SURGERY | Age: 74
End: 2019-10-01

## 2019-10-01 RX ORDER — TRAMADOL HYDROCHLORIDE 50 MG/1
50 TABLET ORAL EVERY 6 HOURS PRN
Status: ON HOLD | COMMUNITY
End: 2019-10-11 | Stop reason: HOSPADM

## 2019-10-02 ENCOUNTER — HOSPITAL ENCOUNTER (OUTPATIENT)
Dept: CT IMAGING | Age: 74
Discharge: HOME | End: 2019-10-02
Payer: MEDICARE

## 2019-10-02 DIAGNOSIS — M25.551 PAIN OF RIGHT HIP JOINT: ICD-10-CM

## 2019-10-02 PROCEDURE — 73700 CT LOWER EXTREMITY W/O DYE: CPT

## 2019-10-03 ENCOUNTER — TELEPHONE (OUTPATIENT)
Dept: ORTHOPEDIC SURGERY | Age: 74
End: 2019-10-03

## 2019-10-07 PROBLEM — N39.0 UTI (URINARY TRACT INFECTION): Status: RESOLVED | Noted: 2019-09-07 | Resolved: 2019-10-07
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
## 2019-10-09 ENCOUNTER — ANESTHESIA EVENT (OUTPATIENT)
Dept: OPERATING ROOM | Age: 74
DRG: 470 | End: 2019-10-09
Payer: MEDICARE

## 2019-10-09 ENCOUNTER — HOSPITAL ENCOUNTER (INPATIENT)
Dept: GENERAL RADIOLOGY | Age: 74
Discharge: HOME | DRG: 470 | End: 2019-10-09
Attending: ORTHOPAEDIC SURGERY
Payer: MEDICARE

## 2019-10-09 ENCOUNTER — HOSPITAL ENCOUNTER (INPATIENT)
Age: 74
LOS: 2 days | Discharge: NURSING, REHAB OR LONG-TERM CARE | DRG: 470 | End: 2019-10-11
Attending: ORTHOPAEDIC SURGERY | Admitting: ORTHOPAEDIC SURGERY
Payer: MEDICARE

## 2019-10-09 ENCOUNTER — ANESTHESIA (OUTPATIENT)
Dept: OPERATING ROOM | Age: 74
DRG: 470 | End: 2019-10-09
Payer: MEDICARE

## 2019-10-09 VITALS
OXYGEN SATURATION: 82 % | DIASTOLIC BLOOD PRESSURE: 86 MMHG | SYSTOLIC BLOOD PRESSURE: 143 MMHG | RESPIRATION RATE: 9 BRPM

## 2019-10-09 DIAGNOSIS — R52 PAIN: ICD-10-CM

## 2019-10-09 DIAGNOSIS — Z96.641 STATUS POST TOTAL HIP REPLACEMENT, RIGHT: ICD-10-CM

## 2019-10-09 DIAGNOSIS — M87.051 AVASCULAR NECROSIS OF BONE OF RIGHT HIP (HCC): Primary | ICD-10-CM

## 2019-10-09 DIAGNOSIS — M16.11 PRIMARY OSTEOARTHRITIS OF RIGHT HIP: ICD-10-CM

## 2019-10-09 LAB
ABO/RH: NORMAL
ANTIBODY SCREEN: NORMAL
GLUCOSE BLD-MCNC: 152 MG/DL (ref 70–99)
GLUCOSE BLD-MCNC: 157 MG/DL (ref 70–99)
INR BLD: 1.08 (ref 0.86–1.14)
PERFORMED ON: ABNORMAL
PERFORMED ON: ABNORMAL
PROTHROMBIN TIME: 12.3 SEC (ref 9.8–13)

## 2019-10-09 PROCEDURE — 2500000003 HC RX 250 WO HCPCS: Performed by: ANESTHESIOLOGY

## 2019-10-09 PROCEDURE — 6360000002 HC RX W HCPCS: Performed by: PHYSICIAN ASSISTANT

## 2019-10-09 PROCEDURE — 85610 PROTHROMBIN TIME: CPT

## 2019-10-09 PROCEDURE — 2720000010 HC SURG SUPPLY STERILE: Performed by: ORTHOPAEDIC SURGERY

## 2019-10-09 PROCEDURE — 1200000000 HC SEMI PRIVATE

## 2019-10-09 PROCEDURE — 3209999900 FLUORO FOR SURGICAL PROCEDURES

## 2019-10-09 PROCEDURE — 2580000003 HC RX 258: Performed by: PHYSICIAN ASSISTANT

## 2019-10-09 PROCEDURE — 76942 ECHO GUIDE FOR BIOPSY: CPT | Performed by: ANESTHESIOLOGY

## 2019-10-09 PROCEDURE — 73501 X-RAY EXAM HIP UNI 1 VIEW: CPT

## 2019-10-09 PROCEDURE — 3600000005 HC SURGERY LEVEL 5 BASE: Performed by: ORTHOPAEDIC SURGERY

## 2019-10-09 PROCEDURE — 2500000003 HC RX 250 WO HCPCS: Performed by: NURSE ANESTHETIST, CERTIFIED REGISTERED

## 2019-10-09 PROCEDURE — 6360000002 HC RX W HCPCS: Performed by: ANESTHESIOLOGY

## 2019-10-09 PROCEDURE — 97161 PT EVAL LOW COMPLEX 20 MIN: CPT

## 2019-10-09 PROCEDURE — 2500000003 HC RX 250 WO HCPCS: Performed by: ORTHOPAEDIC SURGERY

## 2019-10-09 PROCEDURE — 3600000015 HC SURGERY LEVEL 5 ADDTL 15MIN: Performed by: ORTHOPAEDIC SURGERY

## 2019-10-09 PROCEDURE — C9290 INJ, BUPIVACAINE LIPOSOME: HCPCS | Performed by: ORTHOPAEDIC SURGERY

## 2019-10-09 PROCEDURE — 3700000001 HC ADD 15 MINUTES (ANESTHESIA): Performed by: ORTHOPAEDIC SURGERY

## 2019-10-09 PROCEDURE — 97530 THERAPEUTIC ACTIVITIES: CPT

## 2019-10-09 PROCEDURE — C1776 JOINT DEVICE (IMPLANTABLE): HCPCS | Performed by: ORTHOPAEDIC SURGERY

## 2019-10-09 PROCEDURE — 7100000001 HC PACU RECOVERY - ADDTL 15 MIN: Performed by: ORTHOPAEDIC SURGERY

## 2019-10-09 PROCEDURE — 2709999900 HC NON-CHARGEABLE SUPPLY: Performed by: ORTHOPAEDIC SURGERY

## 2019-10-09 PROCEDURE — 2580000003 HC RX 258: Performed by: ANESTHESIOLOGY

## 2019-10-09 PROCEDURE — 88304 TISSUE EXAM BY PATHOLOGIST: CPT

## 2019-10-09 PROCEDURE — 7100000000 HC PACU RECOVERY - FIRST 15 MIN: Performed by: ORTHOPAEDIC SURGERY

## 2019-10-09 PROCEDURE — 6370000000 HC RX 637 (ALT 250 FOR IP): Performed by: PHYSICIAN ASSISTANT

## 2019-10-09 PROCEDURE — 8E0W0CZ ROBOTIC ASSISTED PROCEDURE OF TRUNK REGION, OPEN APPROACH: ICD-10-PCS | Performed by: ORTHOPAEDIC SURGERY

## 2019-10-09 PROCEDURE — 2500000003 HC RX 250 WO HCPCS: Performed by: PHYSICIAN ASSISTANT

## 2019-10-09 PROCEDURE — 6360000002 HC RX W HCPCS

## 2019-10-09 PROCEDURE — 0SR90JA REPLACEMENT OF RIGHT HIP JOINT WITH SYNTHETIC SUBSTITUTE, UNCEMENTED, OPEN APPROACH: ICD-10-PCS | Performed by: ORTHOPAEDIC SURGERY

## 2019-10-09 PROCEDURE — 3700000000 HC ANESTHESIA ATTENDED CARE: Performed by: ORTHOPAEDIC SURGERY

## 2019-10-09 PROCEDURE — 6360000002 HC RX W HCPCS: Performed by: NURSE ANESTHETIST, CERTIFIED REGISTERED

## 2019-10-09 PROCEDURE — 86850 RBC ANTIBODY SCREEN: CPT

## 2019-10-09 PROCEDURE — 6360000002 HC RX W HCPCS: Performed by: ORTHOPAEDIC SURGERY

## 2019-10-09 PROCEDURE — 86900 BLOOD TYPING SEROLOGIC ABO: CPT

## 2019-10-09 PROCEDURE — 86901 BLOOD TYPING SEROLOGIC RH(D): CPT

## 2019-10-09 PROCEDURE — C1713 ANCHOR/SCREW BN/BN,TIS/BN: HCPCS | Performed by: ORTHOPAEDIC SURGERY

## 2019-10-09 PROCEDURE — 97165 OT EVAL LOW COMPLEX 30 MIN: CPT

## 2019-10-09 PROCEDURE — 88311 DECALCIFY TISSUE: CPT

## 2019-10-09 PROCEDURE — 2580000003 HC RX 258: Performed by: ORTHOPAEDIC SURGERY

## 2019-10-09 DEVICE — LINER ACET SZ E ID36MM THK5.9MM 10DEG X3 FOR 54-56MM: Type: IMPLANTABLE DEVICE | Site: HIP | Status: FUNCTIONAL

## 2019-10-09 DEVICE — STEM FEM SZ 4 L105MM NK L35MM 42MM OFFSET 127DEG HIP TI: Type: IMPLANTABLE DEVICE | Site: HIP | Status: FUNCTIONAL

## 2019-10-09 DEVICE — HEAD FEM DIA36MM +0MM OFFSET HIP CO CHROM POLYETH TAPR LO: Type: IMPLANTABLE DEVICE | Site: HIP | Status: FUNCTIONAL

## 2019-10-09 DEVICE — SCREW BNE L20MM DIA65MM LO PROF HEX TRIDENT LL: Type: IMPLANTABLE DEVICE | Site: HIP | Status: FUNCTIONAL

## 2019-10-09 DEVICE — SHELL ACET SZ E DIA52MM 5 CLUS H TRITANIUM PRESSFIT PRI: Type: IMPLANTABLE DEVICE | Site: HIP | Status: FUNCTIONAL

## 2019-10-09 RX ORDER — ONDANSETRON 2 MG/ML
4 INJECTION INTRAMUSCULAR; INTRAVENOUS EVERY 6 HOURS PRN
Status: DISCONTINUED | OUTPATIENT
Start: 2019-10-09 | End: 2019-10-11 | Stop reason: HOSPADM

## 2019-10-09 RX ORDER — OXYCODONE HYDROCHLORIDE AND ACETAMINOPHEN 5; 325 MG/1; MG/1
1 TABLET ORAL PRN
Status: DISCONTINUED | OUTPATIENT
Start: 2019-10-09 | End: 2019-10-09 | Stop reason: HOSPADM

## 2019-10-09 RX ORDER — OXYCODONE HYDROCHLORIDE 5 MG/1
5 TABLET ORAL EVERY 4 HOURS PRN
Status: DISCONTINUED | OUTPATIENT
Start: 2019-10-09 | End: 2019-10-11 | Stop reason: HOSPADM

## 2019-10-09 RX ORDER — SODIUM CHLORIDE, SODIUM LACTATE, POTASSIUM CHLORIDE, CALCIUM CHLORIDE 600; 310; 30; 20 MG/100ML; MG/100ML; MG/100ML; MG/100ML
INJECTION, SOLUTION INTRAVENOUS CONTINUOUS
Status: DISCONTINUED | OUTPATIENT
Start: 2019-10-09 | End: 2019-10-11 | Stop reason: HOSPADM

## 2019-10-09 RX ORDER — FAMOTIDINE 20 MG/1
20 TABLET, FILM COATED ORAL 2 TIMES DAILY PRN
Status: DISCONTINUED | OUTPATIENT
Start: 2019-10-09 | End: 2019-10-11 | Stop reason: HOSPADM

## 2019-10-09 RX ORDER — DEXAMETHASONE SODIUM PHOSPHATE 4 MG/ML
INJECTION, SOLUTION INTRA-ARTICULAR; INTRALESIONAL; INTRAMUSCULAR; INTRAVENOUS; SOFT TISSUE PRN
Status: DISCONTINUED | OUTPATIENT
Start: 2019-10-09 | End: 2019-10-09 | Stop reason: SDUPTHER

## 2019-10-09 RX ORDER — MIDAZOLAM HYDROCHLORIDE 1 MG/ML
0.5 INJECTION INTRAMUSCULAR; INTRAVENOUS EVERY 5 MIN PRN
Status: COMPLETED | OUTPATIENT
Start: 2019-10-09 | End: 2019-10-09

## 2019-10-09 RX ORDER — OXYCODONE HYDROCHLORIDE AND ACETAMINOPHEN 5; 325 MG/1; MG/1
2 TABLET ORAL PRN
Status: DISCONTINUED | OUTPATIENT
Start: 2019-10-09 | End: 2019-10-09 | Stop reason: HOSPADM

## 2019-10-09 RX ORDER — MORPHINE SULFATE 2 MG/ML
2 INJECTION, SOLUTION INTRAMUSCULAR; INTRAVENOUS
Status: DISCONTINUED | OUTPATIENT
Start: 2019-10-09 | End: 2019-10-11 | Stop reason: HOSPADM

## 2019-10-09 RX ORDER — LIDOCAINE HYDROCHLORIDE 10 MG/ML
2 INJECTION, SOLUTION INFILTRATION; PERINEURAL
Status: DISCONTINUED | OUTPATIENT
Start: 2019-10-09 | End: 2019-10-09 | Stop reason: HOSPADM

## 2019-10-09 RX ORDER — CYCLOBENZAPRINE HCL 10 MG
10 TABLET ORAL 3 TIMES DAILY PRN
Status: DISCONTINUED | OUTPATIENT
Start: 2019-10-09 | End: 2019-10-11 | Stop reason: HOSPADM

## 2019-10-09 RX ORDER — MIDAZOLAM HYDROCHLORIDE 1 MG/ML
INJECTION INTRAMUSCULAR; INTRAVENOUS PRN
Status: DISCONTINUED | OUTPATIENT
Start: 2019-10-09 | End: 2019-10-09 | Stop reason: SDUPTHER

## 2019-10-09 RX ORDER — PROMETHAZINE HYDROCHLORIDE 25 MG/ML
6.25 INJECTION, SOLUTION INTRAMUSCULAR; INTRAVENOUS
Status: COMPLETED | OUTPATIENT
Start: 2019-10-09 | End: 2019-10-09

## 2019-10-09 RX ORDER — CELECOXIB 100 MG/1
100 CAPSULE ORAL 2 TIMES DAILY
Status: DISCONTINUED | OUTPATIENT
Start: 2019-10-09 | End: 2019-10-11 | Stop reason: HOSPADM

## 2019-10-09 RX ORDER — MIDAZOLAM HYDROCHLORIDE 1 MG/ML
INJECTION INTRAMUSCULAR; INTRAVENOUS
Status: COMPLETED
Start: 2019-10-09 | End: 2019-10-09

## 2019-10-09 RX ORDER — SODIUM CHLORIDE, SODIUM LACTATE, POTASSIUM CHLORIDE, CALCIUM CHLORIDE 600; 310; 30; 20 MG/100ML; MG/100ML; MG/100ML; MG/100ML
INJECTION, SOLUTION INTRAVENOUS CONTINUOUS
Status: DISCONTINUED | OUTPATIENT
Start: 2019-10-09 | End: 2019-10-09

## 2019-10-09 RX ORDER — ROCURONIUM BROMIDE 10 MG/ML
INJECTION, SOLUTION INTRAVENOUS PRN
Status: DISCONTINUED | OUTPATIENT
Start: 2019-10-09 | End: 2019-10-09 | Stop reason: SDUPTHER

## 2019-10-09 RX ORDER — DOCUSATE SODIUM 100 MG/1
100 CAPSULE, LIQUID FILLED ORAL 2 TIMES DAILY
Status: DISCONTINUED | OUTPATIENT
Start: 2019-10-09 | End: 2019-10-11 | Stop reason: HOSPADM

## 2019-10-09 RX ORDER — PROPOFOL 10 MG/ML
INJECTION, EMULSION INTRAVENOUS PRN
Status: DISCONTINUED | OUTPATIENT
Start: 2019-10-09 | End: 2019-10-09 | Stop reason: SDUPTHER

## 2019-10-09 RX ORDER — MORPHINE SULFATE 2 MG/ML
1 INJECTION, SOLUTION INTRAMUSCULAR; INTRAVENOUS EVERY 5 MIN PRN
Status: DISCONTINUED | OUTPATIENT
Start: 2019-10-09 | End: 2019-10-09 | Stop reason: HOSPADM

## 2019-10-09 RX ORDER — DIPHENHYDRAMINE HYDROCHLORIDE 50 MG/ML
12.5 INJECTION INTRAMUSCULAR; INTRAVENOUS
Status: COMPLETED | OUTPATIENT
Start: 2019-10-09 | End: 2019-10-09

## 2019-10-09 RX ORDER — SODIUM CHLORIDE 0.9 % (FLUSH) 0.9 %
10 SYRINGE (ML) INJECTION PRN
Status: DISCONTINUED | OUTPATIENT
Start: 2019-10-09 | End: 2019-10-11 | Stop reason: HOSPADM

## 2019-10-09 RX ORDER — HYDRALAZINE HYDROCHLORIDE 20 MG/ML
5 INJECTION INTRAMUSCULAR; INTRAVENOUS EVERY 10 MIN PRN
Status: DISCONTINUED | OUTPATIENT
Start: 2019-10-09 | End: 2019-10-09 | Stop reason: HOSPADM

## 2019-10-09 RX ORDER — SODIUM CHLORIDE 0.9 % (FLUSH) 0.9 %
10 SYRINGE (ML) INJECTION EVERY 12 HOURS SCHEDULED
Status: DISCONTINUED | OUTPATIENT
Start: 2019-10-09 | End: 2019-10-11 | Stop reason: HOSPADM

## 2019-10-09 RX ORDER — DEXTROSE MONOHYDRATE 50 MG/ML
100 INJECTION, SOLUTION INTRAVENOUS PRN
Status: DISCONTINUED | OUTPATIENT
Start: 2019-10-09 | End: 2019-10-11 | Stop reason: HOSPADM

## 2019-10-09 RX ORDER — ONDANSETRON 2 MG/ML
INJECTION INTRAMUSCULAR; INTRAVENOUS PRN
Status: DISCONTINUED | OUTPATIENT
Start: 2019-10-09 | End: 2019-10-09 | Stop reason: SDUPTHER

## 2019-10-09 RX ORDER — MORPHINE SULFATE 4 MG/ML
4 INJECTION, SOLUTION INTRAMUSCULAR; INTRAVENOUS
Status: DISCONTINUED | OUTPATIENT
Start: 2019-10-09 | End: 2019-10-11 | Stop reason: HOSPADM

## 2019-10-09 RX ORDER — ACETAMINOPHEN 325 MG/1
650 TABLET ORAL EVERY 6 HOURS
Status: DISCONTINUED | OUTPATIENT
Start: 2019-10-09 | End: 2019-10-11 | Stop reason: HOSPADM

## 2019-10-09 RX ORDER — BUPIVACAINE HYDROCHLORIDE 2.5 MG/ML
INJECTION, SOLUTION EPIDURAL; INFILTRATION; INTRACAUDAL PRN
Status: DISCONTINUED | OUTPATIENT
Start: 2019-10-09 | End: 2019-10-09 | Stop reason: SDUPTHER

## 2019-10-09 RX ORDER — GABAPENTIN 100 MG/1
100 CAPSULE ORAL 2 TIMES DAILY
Status: DISCONTINUED | OUTPATIENT
Start: 2019-10-09 | End: 2019-10-11 | Stop reason: HOSPADM

## 2019-10-09 RX ORDER — CELECOXIB 100 MG/1
100 CAPSULE ORAL ONCE
Status: COMPLETED | OUTPATIENT
Start: 2019-10-09 | End: 2019-10-09

## 2019-10-09 RX ORDER — MORPHINE SULFATE 2 MG/ML
2 INJECTION, SOLUTION INTRAMUSCULAR; INTRAVENOUS EVERY 5 MIN PRN
Status: DISCONTINUED | OUTPATIENT
Start: 2019-10-09 | End: 2019-10-09 | Stop reason: HOSPADM

## 2019-10-09 RX ORDER — LABETALOL HYDROCHLORIDE 5 MG/ML
5 INJECTION, SOLUTION INTRAVENOUS EVERY 10 MIN PRN
Status: DISCONTINUED | OUTPATIENT
Start: 2019-10-09 | End: 2019-10-09 | Stop reason: HOSPADM

## 2019-10-09 RX ORDER — MEPERIDINE HYDROCHLORIDE 50 MG/ML
12.5 INJECTION INTRAMUSCULAR; INTRAVENOUS; SUBCUTANEOUS EVERY 5 MIN PRN
Status: DISCONTINUED | OUTPATIENT
Start: 2019-10-09 | End: 2019-10-09 | Stop reason: HOSPADM

## 2019-10-09 RX ORDER — LIDOCAINE HYDROCHLORIDE 20 MG/ML
INJECTION, SOLUTION INFILTRATION; PERINEURAL PRN
Status: DISCONTINUED | OUTPATIENT
Start: 2019-10-09 | End: 2019-10-09 | Stop reason: SDUPTHER

## 2019-10-09 RX ORDER — ACETAMINOPHEN 500 MG
1000 TABLET ORAL ONCE
Status: COMPLETED | OUTPATIENT
Start: 2019-10-09 | End: 2019-10-09

## 2019-10-09 RX ORDER — NICOTINE POLACRILEX 4 MG
15 LOZENGE BUCCAL PRN
Status: DISCONTINUED | OUTPATIENT
Start: 2019-10-09 | End: 2019-10-11 | Stop reason: HOSPADM

## 2019-10-09 RX ORDER — OXYCODONE HYDROCHLORIDE 5 MG/1
10 TABLET ORAL EVERY 4 HOURS PRN
Status: DISCONTINUED | OUTPATIENT
Start: 2019-10-09 | End: 2019-10-11 | Stop reason: HOSPADM

## 2019-10-09 RX ORDER — ONDANSETRON 2 MG/ML
4 INJECTION INTRAMUSCULAR; INTRAVENOUS
Status: DISCONTINUED | OUTPATIENT
Start: 2019-10-09 | End: 2019-10-09 | Stop reason: HOSPADM

## 2019-10-09 RX ORDER — DEXTROSE MONOHYDRATE 25 G/50ML
12.5 INJECTION, SOLUTION INTRAVENOUS PRN
Status: DISCONTINUED | OUTPATIENT
Start: 2019-10-09 | End: 2019-10-11 | Stop reason: HOSPADM

## 2019-10-09 RX ORDER — KETOROLAC TROMETHAMINE 30 MG/ML
15 INJECTION, SOLUTION INTRAMUSCULAR; INTRAVENOUS EVERY 6 HOURS PRN
Status: DISCONTINUED | OUTPATIENT
Start: 2019-10-09 | End: 2019-10-11 | Stop reason: HOSPADM

## 2019-10-09 RX ORDER — FENTANYL CITRATE 50 UG/ML
INJECTION, SOLUTION INTRAMUSCULAR; INTRAVENOUS PRN
Status: DISCONTINUED | OUTPATIENT
Start: 2019-10-09 | End: 2019-10-09 | Stop reason: SDUPTHER

## 2019-10-09 RX ORDER — GABAPENTIN 300 MG/1
300 CAPSULE ORAL ONCE
Status: COMPLETED | OUTPATIENT
Start: 2019-10-09 | End: 2019-10-09

## 2019-10-09 RX ADMIN — DIPHENHYDRAMINE HYDROCHLORIDE 12.5 MG: 50 INJECTION, SOLUTION INTRAMUSCULAR; INTRAVENOUS at 13:30

## 2019-10-09 RX ADMIN — ACETAMINOPHEN 1000 MG: 500 TABLET ORAL at 09:23

## 2019-10-09 RX ADMIN — MIDAZOLAM HYDROCHLORIDE 2 MG: 2 INJECTION, SOLUTION INTRAMUSCULAR; INTRAVENOUS at 09:41

## 2019-10-09 RX ADMIN — TRANEXAMIC ACID 1000 MG: 100 INJECTION, SOLUTION INTRAVENOUS at 10:37

## 2019-10-09 RX ADMIN — ACETAMINOPHEN 650 MG: 325 TABLET ORAL at 20:48

## 2019-10-09 RX ADMIN — KETOROLAC TROMETHAMINE 15 MG: 30 INJECTION, SOLUTION INTRAMUSCULAR at 20:48

## 2019-10-09 RX ADMIN — Medication 2 G: at 10:25

## 2019-10-09 RX ADMIN — TRANEXAMIC ACID 1000 MG: 100 INJECTION, SOLUTION INTRAVENOUS at 11:50

## 2019-10-09 RX ADMIN — FENTANYL CITRATE 150 MCG: 50 INJECTION, SOLUTION INTRAMUSCULAR; INTRAVENOUS at 10:06

## 2019-10-09 RX ADMIN — PROMETHAZINE HYDROCHLORIDE 6.25 MG: 25 INJECTION INTRAMUSCULAR; INTRAVENOUS at 14:20

## 2019-10-09 RX ADMIN — OXYCODONE HYDROCHLORIDE 10 MG: 5 TABLET ORAL at 18:16

## 2019-10-09 RX ADMIN — MORPHINE SULFATE 2 MG: 2 INJECTION, SOLUTION INTRAMUSCULAR; INTRAVENOUS at 13:38

## 2019-10-09 RX ADMIN — HYDROMORPHONE HYDROCHLORIDE 0.5 MG: 1 INJECTION, SOLUTION INTRAMUSCULAR; INTRAVENOUS; SUBCUTANEOUS at 12:55

## 2019-10-09 RX ADMIN — DEXAMETHASONE SODIUM PHOSPHATE 8 MG: 4 INJECTION, SOLUTION INTRAMUSCULAR; INTRAVENOUS at 10:06

## 2019-10-09 RX ADMIN — PROPOFOL 200 MG: 10 INJECTION, EMULSION INTRAVENOUS at 10:06

## 2019-10-09 RX ADMIN — SODIUM CHLORIDE, POTASSIUM CHLORIDE, SODIUM LACTATE AND CALCIUM CHLORIDE: 600; 310; 30; 20 INJECTION, SOLUTION INTRAVENOUS at 11:58

## 2019-10-09 RX ADMIN — HYDROMORPHONE HYDROCHLORIDE 0.5 MG: 1 INJECTION, SOLUTION INTRAMUSCULAR; INTRAVENOUS; SUBCUTANEOUS at 12:47

## 2019-10-09 RX ADMIN — GABAPENTIN 300 MG: 300 CAPSULE ORAL at 09:23

## 2019-10-09 RX ADMIN — HYDROMORPHONE HYDROCHLORIDE 0.5 MG: 1 INJECTION, SOLUTION INTRAMUSCULAR; INTRAVENOUS; SUBCUTANEOUS at 13:19

## 2019-10-09 RX ADMIN — CELECOXIB 100 MG: 100 CAPSULE ORAL at 20:49

## 2019-10-09 RX ADMIN — ROCURONIUM BROMIDE 50 MG: 10 SOLUTION INTRAVENOUS at 10:06

## 2019-10-09 RX ADMIN — GABAPENTIN 100 MG: 100 CAPSULE ORAL at 20:49

## 2019-10-09 RX ADMIN — SUGAMMADEX 100 MG: 100 INJECTION, SOLUTION INTRAVENOUS at 12:33

## 2019-10-09 RX ADMIN — FAMOTIDINE 20 MG: 20 TABLET ORAL at 20:49

## 2019-10-09 RX ADMIN — FENTANYL CITRATE 50 MCG: 50 INJECTION, SOLUTION INTRAMUSCULAR; INTRAVENOUS at 10:58

## 2019-10-09 RX ADMIN — MORPHINE SULFATE 1 MG: 2 INJECTION, SOLUTION INTRAMUSCULAR; INTRAVENOUS at 14:03

## 2019-10-09 RX ADMIN — FENTANYL CITRATE 25 MCG: 50 INJECTION, SOLUTION INTRAMUSCULAR; INTRAVENOUS at 12:11

## 2019-10-09 RX ADMIN — LIDOCAINE HYDROCHLORIDE 50 MG: 20 INJECTION, SOLUTION INFILTRATION; PERINEURAL at 10:06

## 2019-10-09 RX ADMIN — ONDANSETRON 4 MG: 2 INJECTION INTRAMUSCULAR; INTRAVENOUS at 10:23

## 2019-10-09 RX ADMIN — MIDAZOLAM 0.5 MG: 1 INJECTION INTRAMUSCULAR; INTRAVENOUS at 13:00

## 2019-10-09 RX ADMIN — Medication 2 G: at 19:03

## 2019-10-09 RX ADMIN — DOCUSATE SODIUM 100 MG: 100 CAPSULE, LIQUID FILLED ORAL at 20:49

## 2019-10-09 RX ADMIN — FENTANYL CITRATE 75 MCG: 50 INJECTION, SOLUTION INTRAMUSCULAR; INTRAVENOUS at 12:45

## 2019-10-09 RX ADMIN — SODIUM CHLORIDE, POTASSIUM CHLORIDE, SODIUM LACTATE AND CALCIUM CHLORIDE: 600; 310; 30; 20 INJECTION, SOLUTION INTRAVENOUS at 18:16

## 2019-10-09 RX ADMIN — CELECOXIB 100 MG: 100 CAPSULE ORAL at 09:23

## 2019-10-09 RX ADMIN — HYDROMORPHONE HYDROCHLORIDE 0.5 MG: 1 INJECTION, SOLUTION INTRAMUSCULAR; INTRAVENOUS; SUBCUTANEOUS at 13:08

## 2019-10-09 RX ADMIN — MORPHINE SULFATE 1 MG: 2 INJECTION, SOLUTION INTRAMUSCULAR; INTRAVENOUS at 13:56

## 2019-10-09 RX ADMIN — FENTANYL CITRATE 50 MCG: 50 INJECTION, SOLUTION INTRAMUSCULAR; INTRAVENOUS at 11:26

## 2019-10-09 RX ADMIN — MORPHINE SULFATE 4 MG: 4 INJECTION, SOLUTION INTRAMUSCULAR; INTRAVENOUS at 23:36

## 2019-10-09 RX ADMIN — SODIUM CHLORIDE, POTASSIUM CHLORIDE, SODIUM LACTATE AND CALCIUM CHLORIDE: 600; 310; 30; 20 INJECTION, SOLUTION INTRAVENOUS at 09:23

## 2019-10-09 RX ADMIN — MIDAZOLAM 0.5 MG: 1 INJECTION INTRAMUSCULAR; INTRAVENOUS at 13:11

## 2019-10-09 RX ADMIN — BUPIVACAINE HYDROCHLORIDE 30 ML: 2.5 INJECTION, SOLUTION EPIDURAL; INFILTRATION; INTRACAUDAL; PERINEURAL at 09:41

## 2019-10-09 ASSESSMENT — PULMONARY FUNCTION TESTS
PIF_VALUE: 4
PIF_VALUE: 1
PIF_VALUE: 17
PIF_VALUE: 16
PIF_VALUE: 2
PIF_VALUE: 2
PIF_VALUE: 14
PIF_VALUE: 19
PIF_VALUE: 2
PIF_VALUE: 19
PIF_VALUE: 17
PIF_VALUE: 19
PIF_VALUE: 15
PIF_VALUE: 20
PIF_VALUE: 2
PIF_VALUE: 19
PIF_VALUE: 19
PIF_VALUE: 25
PIF_VALUE: 19
PIF_VALUE: 19
PIF_VALUE: 16
PIF_VALUE: 18
PIF_VALUE: 18
PIF_VALUE: 19
PIF_VALUE: 2
PIF_VALUE: 16
PIF_VALUE: 19
PIF_VALUE: 19
PIF_VALUE: 16
PIF_VALUE: 15
PIF_VALUE: 17
PIF_VALUE: 21
PIF_VALUE: 18
PIF_VALUE: 1
PIF_VALUE: 19
PIF_VALUE: 2
PIF_VALUE: 19
PIF_VALUE: 16
PIF_VALUE: 19
PIF_VALUE: 2
PIF_VALUE: 2
PIF_VALUE: 18
PIF_VALUE: 19
PIF_VALUE: 18
PIF_VALUE: 17
PIF_VALUE: 19
PIF_VALUE: 16
PIF_VALUE: 0
PIF_VALUE: 19
PIF_VALUE: 2
PIF_VALUE: 19
PIF_VALUE: 18
PIF_VALUE: 13
PIF_VALUE: 2
PIF_VALUE: 19
PIF_VALUE: 2
PIF_VALUE: 15
PIF_VALUE: 19
PIF_VALUE: 2
PIF_VALUE: 2
PIF_VALUE: 18
PIF_VALUE: 2
PIF_VALUE: 17
PIF_VALUE: 19
PIF_VALUE: 19
PIF_VALUE: 15
PIF_VALUE: 19
PIF_VALUE: 14
PIF_VALUE: 15
PIF_VALUE: 14
PIF_VALUE: 17
PIF_VALUE: 19
PIF_VALUE: 2
PIF_VALUE: 19
PIF_VALUE: 18
PIF_VALUE: 19
PIF_VALUE: 19
PIF_VALUE: 16
PIF_VALUE: 2
PIF_VALUE: 24
PIF_VALUE: 33
PIF_VALUE: 19
PIF_VALUE: 19
PIF_VALUE: 18
PIF_VALUE: 19
PIF_VALUE: 2
PIF_VALUE: 19
PIF_VALUE: 19
PIF_VALUE: 18
PIF_VALUE: 19
PIF_VALUE: 18
PIF_VALUE: 3
PIF_VALUE: 19
PIF_VALUE: 2
PIF_VALUE: 19
PIF_VALUE: 17
PIF_VALUE: 19
PIF_VALUE: 4
PIF_VALUE: 1
PIF_VALUE: 2
PIF_VALUE: 22
PIF_VALUE: 2
PIF_VALUE: 2
PIF_VALUE: 14
PIF_VALUE: 19
PIF_VALUE: 16
PIF_VALUE: 7
PIF_VALUE: 2
PIF_VALUE: 19
PIF_VALUE: 18
PIF_VALUE: 2
PIF_VALUE: 25
PIF_VALUE: 19
PIF_VALUE: 16
PIF_VALUE: 19
PIF_VALUE: 14
PIF_VALUE: 19
PIF_VALUE: 2
PIF_VALUE: 16
PIF_VALUE: 17
PIF_VALUE: 20
PIF_VALUE: 19
PIF_VALUE: 18
PIF_VALUE: 2
PIF_VALUE: 25
PIF_VALUE: 19
PIF_VALUE: 2
PIF_VALUE: 19
PIF_VALUE: 18
PIF_VALUE: 19
PIF_VALUE: 19
PIF_VALUE: 18
PIF_VALUE: 24
PIF_VALUE: 2
PIF_VALUE: 19
PIF_VALUE: 19
PIF_VALUE: 2
PIF_VALUE: 19
PIF_VALUE: 20
PIF_VALUE: 20
PIF_VALUE: 19
PIF_VALUE: 2
PIF_VALUE: 19
PIF_VALUE: 19
PIF_VALUE: 20
PIF_VALUE: 20
PIF_VALUE: 2
PIF_VALUE: 0
PIF_VALUE: 16

## 2019-10-09 ASSESSMENT — PAIN DESCRIPTION - PAIN TYPE
TYPE: CHRONIC PAIN
TYPE: SURGICAL PAIN

## 2019-10-09 ASSESSMENT — PAIN SCALES - GENERAL
PAINLEVEL_OUTOF10: 8
PAINLEVEL_OUTOF10: 4
PAINLEVEL_OUTOF10: 6
PAINLEVEL_OUTOF10: 7
PAINLEVEL_OUTOF10: 8
PAINLEVEL_OUTOF10: 8
PAINLEVEL_OUTOF10: 6
PAINLEVEL_OUTOF10: 6
PAINLEVEL_OUTOF10: 8
PAINLEVEL_OUTOF10: 4
PAINLEVEL_OUTOF10: 10
PAINLEVEL_OUTOF10: 6
PAINLEVEL_OUTOF10: 8
PAINLEVEL_OUTOF10: 10
PAINLEVEL_OUTOF10: 8

## 2019-10-09 ASSESSMENT — PAIN DESCRIPTION - ORIENTATION
ORIENTATION: RIGHT

## 2019-10-09 ASSESSMENT — PAIN DESCRIPTION - ONSET
ONSET: ON-GOING
ONSET: ON-GOING

## 2019-10-09 ASSESSMENT — PAIN DESCRIPTION - LOCATION
LOCATION: HIP

## 2019-10-09 ASSESSMENT — PAIN DESCRIPTION - PROGRESSION
CLINICAL_PROGRESSION: NOT CHANGED
CLINICAL_PROGRESSION: GRADUALLY IMPROVING

## 2019-10-09 ASSESSMENT — PAIN DESCRIPTION - FREQUENCY
FREQUENCY: INTERMITTENT
FREQUENCY: CONTINUOUS
FREQUENCY: CONTINUOUS

## 2019-10-09 ASSESSMENT — PAIN DESCRIPTION - DESCRIPTORS
DESCRIPTORS: ACHING;BURNING
DESCRIPTORS: ACHING
DESCRIPTORS: THROBBING
DESCRIPTORS: ACHING

## 2019-10-09 ASSESSMENT — PAIN - FUNCTIONAL ASSESSMENT: PAIN_FUNCTIONAL_ASSESSMENT: PREVENTS OR INTERFERES SOME ACTIVE ACTIVITIES AND ADLS

## 2019-10-10 LAB
ANION GAP SERPL CALCULATED.3IONS-SCNC: 9 MMOL/L (ref 3–16)
BASOPHILS ABSOLUTE: 0 K/UL (ref 0–0.2)
BASOPHILS RELATIVE PERCENT: 0.1 %
BUN BLDV-MCNC: 17 MG/DL (ref 7–20)
CALCIUM SERPL-MCNC: 8.6 MG/DL (ref 8.3–10.6)
CHLORIDE BLD-SCNC: 103 MMOL/L (ref 99–110)
CO2: 25 MMOL/L (ref 21–32)
CREAT SERPL-MCNC: 0.6 MG/DL (ref 0.6–1.2)
EOSINOPHILS ABSOLUTE: 0 K/UL (ref 0–0.6)
EOSINOPHILS RELATIVE PERCENT: 0 %
GFR AFRICAN AMERICAN: >60
GFR NON-AFRICAN AMERICAN: >60
GLUCOSE BLD-MCNC: 106 MG/DL (ref 70–99)
GLUCOSE BLD-MCNC: 139 MG/DL (ref 70–99)
GLUCOSE BLD-MCNC: 142 MG/DL (ref 70–99)
GLUCOSE BLD-MCNC: 150 MG/DL (ref 70–99)
GLUCOSE BLD-MCNC: 187 MG/DL (ref 70–99)
HCT VFR BLD CALC: 26.7 % (ref 36–48)
HEMOGLOBIN: 8.9 G/DL (ref 12–16)
LYMPHOCYTES ABSOLUTE: 1.2 K/UL (ref 1–5.1)
LYMPHOCYTES RELATIVE PERCENT: 10.3 %
MCH RBC QN AUTO: 31 PG (ref 26–34)
MCHC RBC AUTO-ENTMCNC: 33.3 G/DL (ref 31–36)
MCV RBC AUTO: 93 FL (ref 80–100)
MONOCYTES ABSOLUTE: 1 K/UL (ref 0–1.3)
MONOCYTES RELATIVE PERCENT: 8.4 %
NEUTROPHILS ABSOLUTE: 9.2 K/UL (ref 1.7–7.7)
NEUTROPHILS RELATIVE PERCENT: 81.2 %
PDW BLD-RTO: 13.5 % (ref 12.4–15.4)
PERFORMED ON: ABNORMAL
PLATELET # BLD: 183 K/UL (ref 135–450)
PMV BLD AUTO: 7.7 FL (ref 5–10.5)
POTASSIUM REFLEX MAGNESIUM: 4.2 MMOL/L (ref 3.5–5.1)
RBC # BLD: 2.87 M/UL (ref 4–5.2)
SODIUM BLD-SCNC: 137 MMOL/L (ref 136–145)
WBC # BLD: 11.4 K/UL (ref 4–11)

## 2019-10-10 PROCEDURE — 6370000000 HC RX 637 (ALT 250 FOR IP): Performed by: PHYSICIAN ASSISTANT

## 2019-10-10 PROCEDURE — 94761 N-INVAS EAR/PLS OXIMETRY MLT: CPT

## 2019-10-10 PROCEDURE — G0008 ADMIN INFLUENZA VIRUS VAC: HCPCS | Performed by: ORTHOPAEDIC SURGERY

## 2019-10-10 PROCEDURE — 1200000000 HC SEMI PRIVATE

## 2019-10-10 PROCEDURE — 2580000003 HC RX 258: Performed by: PHYSICIAN ASSISTANT

## 2019-10-10 PROCEDURE — 90686 IIV4 VACC NO PRSV 0.5 ML IM: CPT | Performed by: ORTHOPAEDIC SURGERY

## 2019-10-10 PROCEDURE — 80048 BASIC METABOLIC PNL TOTAL CA: CPT

## 2019-10-10 PROCEDURE — APPSS15 APP SPLIT SHARED TIME 0-15 MINUTES: Performed by: PHYSICIAN ASSISTANT

## 2019-10-10 PROCEDURE — 97110 THERAPEUTIC EXERCISES: CPT

## 2019-10-10 PROCEDURE — 6360000002 HC RX W HCPCS: Performed by: PHYSICIAN ASSISTANT

## 2019-10-10 PROCEDURE — 36415 COLL VENOUS BLD VENIPUNCTURE: CPT

## 2019-10-10 PROCEDURE — 6360000002 HC RX W HCPCS: Performed by: ORTHOPAEDIC SURGERY

## 2019-10-10 PROCEDURE — 97535 SELF CARE MNGMENT TRAINING: CPT

## 2019-10-10 PROCEDURE — 97116 GAIT TRAINING THERAPY: CPT

## 2019-10-10 PROCEDURE — 85025 COMPLETE CBC W/AUTO DIFF WBC: CPT

## 2019-10-10 PROCEDURE — 2700000000 HC OXYGEN THERAPY PER DAY

## 2019-10-10 RX ADMIN — DOCUSATE SODIUM 100 MG: 100 CAPSULE, LIQUID FILLED ORAL at 08:08

## 2019-10-10 RX ADMIN — CYCLOBENZAPRINE 10 MG: 10 TABLET, FILM COATED ORAL at 08:08

## 2019-10-10 RX ADMIN — OXYCODONE HYDROCHLORIDE 10 MG: 5 TABLET ORAL at 18:48

## 2019-10-10 RX ADMIN — APIXABAN 2.5 MG: 2.5 TABLET, FILM COATED ORAL at 20:36

## 2019-10-10 RX ADMIN — CELECOXIB 100 MG: 100 CAPSULE ORAL at 08:08

## 2019-10-10 RX ADMIN — OXYCODONE HYDROCHLORIDE 5 MG: 5 TABLET ORAL at 08:07

## 2019-10-10 RX ADMIN — GABAPENTIN 100 MG: 100 CAPSULE ORAL at 20:38

## 2019-10-10 RX ADMIN — MORPHINE SULFATE 4 MG: 4 INJECTION, SOLUTION INTRAMUSCULAR; INTRAVENOUS at 21:57

## 2019-10-10 RX ADMIN — MORPHINE SULFATE 2 MG: 2 INJECTION, SOLUTION INTRAMUSCULAR; INTRAVENOUS at 10:42

## 2019-10-10 RX ADMIN — DOCUSATE SODIUM 100 MG: 100 CAPSULE, LIQUID FILLED ORAL at 20:38

## 2019-10-10 RX ADMIN — INSULIN LISPRO 1 UNITS: 100 INJECTION, SOLUTION INTRAVENOUS; SUBCUTANEOUS at 21:58

## 2019-10-10 RX ADMIN — Medication 10 ML: at 21:58

## 2019-10-10 RX ADMIN — Medication 2 G: at 01:23

## 2019-10-10 RX ADMIN — APIXABAN 2.5 MG: 2.5 TABLET, FILM COATED ORAL at 08:08

## 2019-10-10 RX ADMIN — ACETAMINOPHEN 650 MG: 325 TABLET ORAL at 17:18

## 2019-10-10 RX ADMIN — GABAPENTIN 100 MG: 100 CAPSULE ORAL at 08:08

## 2019-10-10 RX ADMIN — ACETAMINOPHEN 650 MG: 325 TABLET ORAL at 11:55

## 2019-10-10 RX ADMIN — INSULIN LISPRO 1 UNITS: 100 INJECTION, SOLUTION INTRAVENOUS; SUBCUTANEOUS at 10:42

## 2019-10-10 RX ADMIN — ACETAMINOPHEN 650 MG: 325 TABLET ORAL at 08:08

## 2019-10-10 RX ADMIN — SODIUM CHLORIDE, POTASSIUM CHLORIDE, SODIUM LACTATE AND CALCIUM CHLORIDE: 600; 310; 30; 20 INJECTION, SOLUTION INTRAVENOUS at 01:23

## 2019-10-10 RX ADMIN — INFLUENZA A VIRUS A/BRISBANE/02/2018 IVR-190 (H1N1) ANTIGEN (PROPIOLACTONE INACTIVATED), INFLUENZA A VIRUS A/KANSAS/14/2017 X-327 (H3N2) ANTIGEN (PROPIOLACTONE INACTIVATED), INFLUENZA B VIRUS B/MARYLAND/15/2016 ANTIGEN (PROPIOLACTONE INACTIVATED), INFLUENZA B VIRUS B/PHUKET/3073/2013 BVR-1B ANTIGEN (PROPIOLACTONE INACTIVATED) 0.5 ML: 15; 15; 15; 15 INJECTION, SUSPENSION INTRAMUSCULAR at 08:08

## 2019-10-10 RX ADMIN — CYCLOBENZAPRINE 10 MG: 10 TABLET, FILM COATED ORAL at 20:36

## 2019-10-10 RX ADMIN — CELECOXIB 100 MG: 100 CAPSULE ORAL at 20:38

## 2019-10-10 RX ADMIN — FAMOTIDINE 20 MG: 20 TABLET ORAL at 20:39

## 2019-10-10 ASSESSMENT — PAIN DESCRIPTION - DESCRIPTORS: DESCRIPTORS: ACHING

## 2019-10-10 ASSESSMENT — PAIN DESCRIPTION - ORIENTATION: ORIENTATION: RIGHT

## 2019-10-10 ASSESSMENT — PAIN SCALES - GENERAL
PAINLEVEL_OUTOF10: 4
PAINLEVEL_OUTOF10: 5
PAINLEVEL_OUTOF10: 6
PAINLEVEL_OUTOF10: 9
PAINLEVEL_OUTOF10: 6
PAINLEVEL_OUTOF10: 4
PAINLEVEL_OUTOF10: 7
PAINLEVEL_OUTOF10: 6
PAINLEVEL_OUTOF10: 3

## 2019-10-10 ASSESSMENT — PAIN SCALES - WONG BAKER
WONGBAKER_NUMERICALRESPONSE: 6
WONGBAKER_NUMERICALRESPONSE: 4

## 2019-10-10 ASSESSMENT — PAIN DESCRIPTION - LOCATION: LOCATION: HIP

## 2019-10-10 ASSESSMENT — PAIN DESCRIPTION - PAIN TYPE: TYPE: SURGICAL PAIN

## 2019-10-11 VITALS
OXYGEN SATURATION: 96 % | TEMPERATURE: 97.9 F | WEIGHT: 216 LBS | HEIGHT: 65 IN | HEART RATE: 84 BPM | SYSTOLIC BLOOD PRESSURE: 134 MMHG | RESPIRATION RATE: 16 BRPM | BODY MASS INDEX: 35.99 KG/M2 | DIASTOLIC BLOOD PRESSURE: 82 MMHG

## 2019-10-11 LAB
BASOPHILS ABSOLUTE: 0.1 K/UL (ref 0–0.2)
BASOPHILS RELATIVE PERCENT: 0.5 %
EOSINOPHILS ABSOLUTE: 0.1 K/UL (ref 0–0.6)
EOSINOPHILS RELATIVE PERCENT: 0.9 %
GLUCOSE BLD-MCNC: 103 MG/DL (ref 70–99)
GLUCOSE BLD-MCNC: 105 MG/DL (ref 70–99)
HCT VFR BLD CALC: 27.4 % (ref 36–48)
HEMOGLOBIN: 9.3 G/DL (ref 12–16)
LYMPHOCYTES ABSOLUTE: 2 K/UL (ref 1–5.1)
LYMPHOCYTES RELATIVE PERCENT: 21.6 %
MCH RBC QN AUTO: 31.6 PG (ref 26–34)
MCHC RBC AUTO-ENTMCNC: 33.9 G/DL (ref 31–36)
MCV RBC AUTO: 93 FL (ref 80–100)
MONOCYTES ABSOLUTE: 0.9 K/UL (ref 0–1.3)
MONOCYTES RELATIVE PERCENT: 9 %
NEUTROPHILS ABSOLUTE: 6.4 K/UL (ref 1.7–7.7)
NEUTROPHILS RELATIVE PERCENT: 68 %
PDW BLD-RTO: 14.1 % (ref 12.4–15.4)
PERFORMED ON: ABNORMAL
PERFORMED ON: ABNORMAL
PLATELET # BLD: 185 K/UL (ref 135–450)
PMV BLD AUTO: 8.4 FL (ref 5–10.5)
RBC # BLD: 2.95 M/UL (ref 4–5.2)
WBC # BLD: 9.4 K/UL (ref 4–11)

## 2019-10-11 PROCEDURE — 85025 COMPLETE CBC W/AUTO DIFF WBC: CPT

## 2019-10-11 PROCEDURE — 6370000000 HC RX 637 (ALT 250 FOR IP): Performed by: PHYSICIAN ASSISTANT

## 2019-10-11 PROCEDURE — APPSS45 APP SPLIT SHARED TIME 31-45 MINUTES: Performed by: PHYSICIAN ASSISTANT

## 2019-10-11 PROCEDURE — 36415 COLL VENOUS BLD VENIPUNCTURE: CPT

## 2019-10-11 PROCEDURE — 97116 GAIT TRAINING THERAPY: CPT

## 2019-10-11 PROCEDURE — 97110 THERAPEUTIC EXERCISES: CPT

## 2019-10-11 PROCEDURE — 97535 SELF CARE MNGMENT TRAINING: CPT

## 2019-10-11 PROCEDURE — 97530 THERAPEUTIC ACTIVITIES: CPT

## 2019-10-11 RX ORDER — CYCLOBENZAPRINE HCL 10 MG
10 TABLET ORAL 3 TIMES DAILY PRN
Qty: 21 TABLET | Refills: 0 | Status: SHIPPED | OUTPATIENT
Start: 2019-10-11 | End: 2019-10-21

## 2019-10-11 RX ORDER — OXYCODONE HYDROCHLORIDE AND ACETAMINOPHEN 5; 325 MG/1; MG/1
1-2 TABLET ORAL EVERY 4 HOURS PRN
Qty: 40 TABLET | Refills: 0 | Status: SHIPPED | OUTPATIENT
Start: 2019-10-11 | End: 2019-10-14

## 2019-10-11 RX ADMIN — OXYCODONE HYDROCHLORIDE 10 MG: 5 TABLET ORAL at 05:46

## 2019-10-11 RX ADMIN — DOCUSATE SODIUM 100 MG: 100 CAPSULE, LIQUID FILLED ORAL at 09:55

## 2019-10-11 RX ADMIN — APIXABAN 2.5 MG: 2.5 TABLET, FILM COATED ORAL at 09:54

## 2019-10-11 RX ADMIN — OXYCODONE HYDROCHLORIDE 10 MG: 5 TABLET ORAL at 00:03

## 2019-10-11 RX ADMIN — ACETAMINOPHEN 650 MG: 325 TABLET ORAL at 05:46

## 2019-10-11 RX ADMIN — CELECOXIB 100 MG: 100 CAPSULE ORAL at 09:54

## 2019-10-11 RX ADMIN — ACETAMINOPHEN 650 MG: 325 TABLET ORAL at 12:08

## 2019-10-11 RX ADMIN — GABAPENTIN 100 MG: 100 CAPSULE ORAL at 09:55

## 2019-10-11 RX ADMIN — OXYCODONE HYDROCHLORIDE 5 MG: 5 TABLET ORAL at 09:55

## 2019-10-11 RX ADMIN — OXYCODONE HYDROCHLORIDE 10 MG: 5 TABLET ORAL at 14:57

## 2019-10-11 RX ADMIN — ACETAMINOPHEN 650 MG: 325 TABLET ORAL at 00:04

## 2019-10-11 ASSESSMENT — PAIN DESCRIPTION - PAIN TYPE
TYPE: SURGICAL PAIN
TYPE: SURGICAL PAIN

## 2019-10-11 ASSESSMENT — PAIN SCALES - GENERAL
PAINLEVEL_OUTOF10: 7
PAINLEVEL_OUTOF10: 3
PAINLEVEL_OUTOF10: 4
PAINLEVEL_OUTOF10: 7
PAINLEVEL_OUTOF10: 3
PAINLEVEL_OUTOF10: 4

## 2019-10-11 ASSESSMENT — PAIN DESCRIPTION - LOCATION
LOCATION: HEAD
LOCATION: HIP
LOCATION: HIP

## 2019-10-11 ASSESSMENT — PAIN DESCRIPTION - DESCRIPTORS
DESCRIPTORS: ACHING
DESCRIPTORS: ACHING

## 2019-10-11 ASSESSMENT — PAIN DESCRIPTION - ORIENTATION
ORIENTATION: RIGHT
ORIENTATION: RIGHT

## 2019-10-12 ENCOUNTER — APPOINTMENT (OUTPATIENT)
Dept: GENERAL RADIOLOGY | Age: 74
End: 2019-10-12
Payer: MEDICARE

## 2019-10-12 ENCOUNTER — APPOINTMENT (OUTPATIENT)
Dept: CT IMAGING | Age: 74
End: 2019-10-12
Payer: MEDICARE

## 2019-10-12 ENCOUNTER — HOSPITAL ENCOUNTER (EMERGENCY)
Age: 74
Discharge: LEFT BEFORE DISCHARGE | End: 2019-10-12
Attending: EMERGENCY MEDICINE
Payer: MEDICARE

## 2019-10-12 VITALS
TEMPERATURE: 98.7 F | DIASTOLIC BLOOD PRESSURE: 53 MMHG | SYSTOLIC BLOOD PRESSURE: 123 MMHG | RESPIRATION RATE: 18 BRPM | WEIGHT: 216 LBS | HEART RATE: 99 BPM | BODY MASS INDEX: 36.88 KG/M2 | OXYGEN SATURATION: 96 % | HEIGHT: 64 IN

## 2019-10-12 DIAGNOSIS — R47.81 SLURRED SPEECH: Primary | ICD-10-CM

## 2019-10-12 DIAGNOSIS — Z53.29 LEFT AGAINST MEDICAL ADVICE: ICD-10-CM

## 2019-10-12 LAB
A/G RATIO: 1.1 (ref 1.1–2.2)
ALBUMIN SERPL-MCNC: 3.5 G/DL (ref 3.4–5)
ALP BLD-CCNC: 85 U/L (ref 40–129)
ALT SERPL-CCNC: 6 U/L (ref 10–40)
ANION GAP SERPL CALCULATED.3IONS-SCNC: 11 MMOL/L (ref 3–16)
AST SERPL-CCNC: 15 U/L (ref 15–37)
BASOPHILS ABSOLUTE: 0.1 K/UL (ref 0–0.2)
BASOPHILS RELATIVE PERCENT: 0.5 %
BILIRUB SERPL-MCNC: <0.2 MG/DL (ref 0–1)
BILIRUBIN URINE: NEGATIVE
BLOOD, URINE: NEGATIVE
BUN BLDV-MCNC: 20 MG/DL (ref 7–20)
CALCIUM SERPL-MCNC: 9.5 MG/DL (ref 8.3–10.6)
CHLORIDE BLD-SCNC: 100 MMOL/L (ref 99–110)
CLARITY: CLEAR
CO2: 28 MMOL/L (ref 21–32)
COLOR: YELLOW
CREAT SERPL-MCNC: 0.7 MG/DL (ref 0.6–1.2)
EOSINOPHILS ABSOLUTE: 0.1 K/UL (ref 0–0.6)
EOSINOPHILS RELATIVE PERCENT: 1 %
GFR AFRICAN AMERICAN: >60
GFR NON-AFRICAN AMERICAN: >60
GLOBULIN: 3.3 G/DL
GLUCOSE BLD-MCNC: 158 MG/DL (ref 70–99)
GLUCOSE URINE: NEGATIVE MG/DL
HCT VFR BLD CALC: 30.1 % (ref 36–48)
HEMOGLOBIN: 10.3 G/DL (ref 12–16)
INR BLD: 1.11 (ref 0.86–1.14)
KETONES, URINE: NEGATIVE MG/DL
LEUKOCYTE ESTERASE, URINE: NEGATIVE
LYMPHOCYTES ABSOLUTE: 2 K/UL (ref 1–5.1)
LYMPHOCYTES RELATIVE PERCENT: 17.5 %
MCH RBC QN AUTO: 31.5 PG (ref 26–34)
MCHC RBC AUTO-ENTMCNC: 34.1 G/DL (ref 31–36)
MCV RBC AUTO: 92.3 FL (ref 80–100)
MICROSCOPIC EXAMINATION: NORMAL
MONOCYTES ABSOLUTE: 0.9 K/UL (ref 0–1.3)
MONOCYTES RELATIVE PERCENT: 7.8 %
NEUTROPHILS ABSOLUTE: 8.3 K/UL (ref 1.7–7.7)
NEUTROPHILS RELATIVE PERCENT: 73.2 %
NITRITE, URINE: NEGATIVE
PDW BLD-RTO: 13.7 % (ref 12.4–15.4)
PH UA: 7 (ref 5–8)
PLATELET # BLD: 211 K/UL (ref 135–450)
PMV BLD AUTO: 7.8 FL (ref 5–10.5)
POTASSIUM SERPL-SCNC: 4.2 MMOL/L (ref 3.5–5.1)
PROTEIN UA: NEGATIVE MG/DL
PROTHROMBIN TIME: 12.7 SEC (ref 9.8–13)
RBC # BLD: 3.26 M/UL (ref 4–5.2)
SODIUM BLD-SCNC: 139 MMOL/L (ref 136–145)
SPECIFIC GRAVITY UA: 1.01 (ref 1–1.03)
TOTAL PROTEIN: 6.8 G/DL (ref 6.4–8.2)
TROPONIN: <0.01 NG/ML
URINE TYPE: NORMAL
UROBILINOGEN, URINE: 0.2 E.U./DL
WBC # BLD: 11.4 K/UL (ref 4–11)

## 2019-10-12 PROCEDURE — 80053 COMPREHEN METABOLIC PANEL: CPT

## 2019-10-12 PROCEDURE — 71045 X-RAY EXAM CHEST 1 VIEW: CPT

## 2019-10-12 PROCEDURE — 99285 EMERGENCY DEPT VISIT HI MDM: CPT

## 2019-10-12 PROCEDURE — 81003 URINALYSIS AUTO W/O SCOPE: CPT

## 2019-10-12 PROCEDURE — 85610 PROTHROMBIN TIME: CPT

## 2019-10-12 PROCEDURE — 84484 ASSAY OF TROPONIN QUANT: CPT

## 2019-10-12 PROCEDURE — 85025 COMPLETE CBC W/AUTO DIFF WBC: CPT

## 2019-10-12 ASSESSMENT — ENCOUNTER SYMPTOMS
COLOR CHANGE: 0
NAUSEA: 0
SHORTNESS OF BREATH: 0
VOMITING: 0
ABDOMINAL PAIN: 0

## 2019-10-15 ENCOUNTER — TELEPHONE (OUTPATIENT)
Dept: ORTHOPEDIC SURGERY | Age: 74
End: 2019-10-15

## 2019-10-15 DIAGNOSIS — Z96.641 STATUS POST TOTAL HIP REPLACEMENT, RIGHT: ICD-10-CM

## 2019-10-22 ENCOUNTER — OFFICE VISIT (OUTPATIENT)
Dept: ORTHOPEDIC SURGERY | Age: 74
End: 2019-10-22

## 2019-10-22 DIAGNOSIS — Z96.641 STATUS POST TOTAL HIP REPLACEMENT, RIGHT: Primary | ICD-10-CM

## 2019-10-22 PROCEDURE — 99024 POSTOP FOLLOW-UP VISIT: CPT | Performed by: PHYSICIAN ASSISTANT

## 2019-10-22 RX ORDER — DOCUSATE SODIUM 100 MG/1
100 CAPSULE, LIQUID FILLED ORAL 2 TIMES DAILY
COMMUNITY
End: 2020-02-12

## 2019-10-22 RX ORDER — OXYCODONE HYDROCHLORIDE AND ACETAMINOPHEN 5; 325 MG/1; MG/1
1 TABLET ORAL EVERY 4 HOURS PRN
Status: ON HOLD | COMMUNITY
End: 2020-02-21 | Stop reason: HOSPADM

## 2019-10-22 RX ORDER — CYCLOBENZAPRINE HCL 10 MG
10 TABLET ORAL 3 TIMES DAILY PRN
COMMUNITY
End: 2019-11-14 | Stop reason: SDUPTHER

## 2019-11-07 ENCOUNTER — TELEPHONE (OUTPATIENT)
Dept: ORTHOPEDIC SURGERY | Age: 74
End: 2019-11-07

## 2019-11-07 DIAGNOSIS — Z96.641 STATUS POST TOTAL HIP REPLACEMENT, RIGHT: ICD-10-CM

## 2019-11-14 ENCOUNTER — OFFICE VISIT (OUTPATIENT)
Dept: ORTHOPEDIC SURGERY | Age: 74
End: 2019-11-14

## 2019-11-14 DIAGNOSIS — Z96.641 STATUS POST TOTAL HIP REPLACEMENT, RIGHT: Primary | ICD-10-CM

## 2019-11-14 PROCEDURE — 99024 POSTOP FOLLOW-UP VISIT: CPT | Performed by: ORTHOPAEDIC SURGERY

## 2019-11-14 RX ORDER — TRAMADOL HYDROCHLORIDE 50 MG/1
50 TABLET ORAL EVERY 6 HOURS PRN
Qty: 28 TABLET | Refills: 0 | Status: SHIPPED | OUTPATIENT
Start: 2019-11-14 | End: 2019-11-21

## 2019-11-14 RX ORDER — CYCLOBENZAPRINE HCL 10 MG
10 TABLET ORAL 3 TIMES DAILY PRN
Qty: 30 TABLET | Refills: 0 | Status: SHIPPED | OUTPATIENT
Start: 2019-11-14 | End: 2019-12-16 | Stop reason: SDUPTHER

## 2019-11-14 RX ORDER — OXYCODONE HYDROCHLORIDE AND ACETAMINOPHEN 5; 325 MG/1; MG/1
1 TABLET ORAL EVERY 4 HOURS PRN
Qty: 28 TABLET | Refills: 0 | Status: CANCELLED | OUTPATIENT
Start: 2019-11-14 | End: 2019-11-21

## 2019-12-16 ENCOUNTER — OFFICE VISIT (OUTPATIENT)
Dept: ORTHOPEDIC SURGERY | Age: 74
End: 2019-12-16
Payer: MEDICARE

## 2019-12-16 VITALS — BODY MASS INDEX: 36.89 KG/M2 | HEIGHT: 64 IN | WEIGHT: 216.05 LBS

## 2019-12-16 DIAGNOSIS — Z96.641 STATUS POST TOTAL HIP REPLACEMENT, RIGHT: ICD-10-CM

## 2019-12-16 DIAGNOSIS — M16.12 PRIMARY OSTEOARTHRITIS OF LEFT HIP: Primary | ICD-10-CM

## 2019-12-16 PROCEDURE — G8417 CALC BMI ABV UP PARAM F/U: HCPCS | Performed by: ORTHOPAEDIC SURGERY

## 2019-12-16 PROCEDURE — 1123F ACP DISCUSS/DSCN MKR DOCD: CPT | Performed by: ORTHOPAEDIC SURGERY

## 2019-12-16 PROCEDURE — 3017F COLORECTAL CA SCREEN DOC REV: CPT | Performed by: ORTHOPAEDIC SURGERY

## 2019-12-16 PROCEDURE — G8482 FLU IMMUNIZE ORDER/ADMIN: HCPCS | Performed by: ORTHOPAEDIC SURGERY

## 2019-12-16 PROCEDURE — G8427 DOCREV CUR MEDS BY ELIG CLIN: HCPCS | Performed by: ORTHOPAEDIC SURGERY

## 2019-12-16 PROCEDURE — G8400 PT W/DXA NO RESULTS DOC: HCPCS | Performed by: ORTHOPAEDIC SURGERY

## 2019-12-16 PROCEDURE — 1036F TOBACCO NON-USER: CPT | Performed by: ORTHOPAEDIC SURGERY

## 2019-12-16 PROCEDURE — 99213 OFFICE O/P EST LOW 20 MIN: CPT | Performed by: ORTHOPAEDIC SURGERY

## 2019-12-16 PROCEDURE — 1090F PRES/ABSN URINE INCON ASSESS: CPT | Performed by: ORTHOPAEDIC SURGERY

## 2019-12-16 PROCEDURE — 4040F PNEUMOC VAC/ADMIN/RCVD: CPT | Performed by: ORTHOPAEDIC SURGERY

## 2019-12-16 RX ORDER — CYCLOBENZAPRINE HCL 10 MG
10 TABLET ORAL 3 TIMES DAILY PRN
Qty: 30 TABLET | Refills: 0 | Status: SHIPPED | OUTPATIENT
Start: 2019-12-16 | End: 2020-01-02 | Stop reason: SDUPTHER

## 2019-12-16 RX ORDER — TRAMADOL HYDROCHLORIDE 50 MG/1
50 TABLET ORAL EVERY 6 HOURS PRN
Qty: 28 TABLET | Refills: 0 | Status: SHIPPED | OUTPATIENT
Start: 2019-12-16 | End: 2019-12-23

## 2020-01-02 RX ORDER — CYCLOBENZAPRINE HCL 10 MG
10 TABLET ORAL 3 TIMES DAILY PRN
Qty: 30 TABLET | Refills: 0 | Status: SHIPPED | OUTPATIENT
Start: 2020-01-02 | End: 2020-04-20 | Stop reason: ALTCHOICE

## 2020-01-10 ENCOUNTER — TELEPHONE (OUTPATIENT)
Dept: ORTHOPEDIC SURGERY | Age: 75
End: 2020-01-10

## 2020-01-10 DIAGNOSIS — M25.551 PAIN OF RIGHT HIP JOINT: Primary | ICD-10-CM

## 2020-01-10 DIAGNOSIS — M25.552 PAIN OF LEFT HIP JOINT: ICD-10-CM

## 2020-01-10 NOTE — TELEPHONE ENCOUNTER
10/9/19 RT LAT THR BINDU. SCHEDULED FOR LT LAT THR ON 2/18. SAID BOTH OF HER LEGS ARE RED. NO PAIN, WARMTH OR SWELLING. JUST RED. HOME CARE NURSE WILL BE THERE TODAY. I TOLD HER TO HAVE HER TAKE A LOOK AT HER LEGS AND GIVE US A CALL.

## 2020-01-10 NOTE — TELEPHONE ENCOUNTER
CRISTHIAN Winters w/ [facility] called. Patients knee looks a little pink. No Fever. Vitals are good. Patient reports increase in pain, but she is not taking her Tylenol. She suggested she start taking again for pain relief. Patient instructed to keep an eye on it if symptoms worsen to call for an appointment.

## 2020-01-10 NOTE — TELEPHONE ENCOUNTER
I would recommend she take her Tylenol for pain and encouraged her to use compression and elevation for edema and swelling. She may be just developing some venous stasis of the lower extremities.   She should probably make sure she is seen by her primary care physician before surgery regarding this issue

## 2020-01-20 ENCOUNTER — TELEPHONE (OUTPATIENT)
Dept: ORTHOPEDIC SURGERY | Age: 75
End: 2020-01-20

## 2020-01-20 NOTE — TELEPHONE ENCOUNTER
Orthopedic Nurse Navigator Summary  -  Patient Name: Mellisa Anderson  Anticipated Date of Surgery: 02/18/2020  Using OrthoVitals? Yes, Are they Registered: Yes  Attended Pre-Op Education Class: No  If No, why not? Previously attended  PCP:  Phone #:  Date of PCP Visit for H&P: 01/20/2020  Any Noted Concerns from PCP prior to surgery: No  If Yes, what concerns?:  Is the Patient in a Pain Management Program?: N/A  Review of Past Medical History Reveals History of:  -  Critical Lab Values:  - Hemoglobin (g/dL): Date Value 10.8  - Hematocrit (%): Date Value  - HgbA1C : Date Value 5.8  - Albumin : Date Value 4.2  - BUN (mg/dL) : Date Value 22.0  - CREATININE (mg/dL) : Date Value 0.7  - BMI (kg/m2) : Date Value  -  Coronary Artery Disease/HTN/CHF History: Yes  Cardiologist: HTN managed by PCP and meds  Cardiac Clearance Necessary: No  Date of Cardiac Clearance Appt: On Plavix? No  If YES, when will they stop taking? Final Cardiac Recommendations: On any anticoagulation: No  -  Diabetes History: No  Most Recent HgbA1C: 5.8  PCP or Endocrine Recommendations:  Nutritionist/Dietician Consult Scheduled:  Final Plan For Diabetic Control:  Pulmonary: COPD/Emphysema/ Use of home oxygen: none  Alcohol use: Non-Drinker  -  DVT Risk Stratification: High Risk  Vascular Consult Ordered: No  Date of Vascular Appt:  Hematology/Oncology Consult Ordered: no  Date of Hematology/Oncology Appt:  Final Recommendation For DVT Prophylaxis:  Smoking history: Non-Smoker  Use of Estrogen:  -  BMI Greater than 40 at time of scheduling?: No  Has Surgeon been notified of BMI concern? No  Weight Loss Clinic Consult Ordered No  Date of Wt Loss Clinic Appt:  BMI at time of surgery (if went through [facility]):  -  Additional Medical Concerns: Previous history of DVT in May 2019.  HGB low at 10.8- emailed Dr. Checo Braun  Additional Recommendations for above concerns: Previously  Attended Pre-Hab Program: No  Anticipated Discharge Disposition: Skilled Nursing Facility  Who will be with patient at home following discharge? Sister- pt lives with her  Equipment patient already has: Kenith Decree, shower chair, chair lift to second level  Bedroom on first or second floor: Second- can sleep in a recliner on first floor  Bathroom on first or second floor: First  Weight bearing status: Full  Pre-op ambulatory status:  Number of entry steps: 4 steps  Caregiver assistance: Full time  Raad Lopezreece  02/12/2020    ORTHOPAEDIC NURSE NAVIGATOR SUMMARY NOTE    She does have a history of a recent DVT earlier this summer and has been taking Eliquis since this was found.  She did have a recently have a second Doppler ultrasound which found that the DVT has been resolved but she has not yet been taken off of her blood thinners. Anticipated Date of Surgery: 2/18/20    Using OrthoVitals? Yes, Are they Registered:  yes   If No, why not? N/A    Attended Pre-Op Education Class: No-previous surgery   If No, why not?   Had Other TJR in last 6months      PCP: Freddie Villaseñor MD   Phone #: [phone]    Date of PCP Visit for H&P: 1/20/20    Any Noted Concerns from PCP prior to surgery:  No   If Yes, what concerns?:        IS THE PATIENT IN A PAIN MANAGEMENT PROGRAM?:   Not Applicable         Review of Past Medical History Reveals History of:      Critical Lab Values:   Hgb/Hct:   Hemoglobin (g/dL)   Date Value   10/12/2019 10.3 (L)   /  Hematocrit (%)   Date Value   10/12/2019 30.1 (L)      HgbA1C:    Lab Results   Component Value Date    LABA1C 5.2 09/26/2019      Albumin:    Lab Results   Component Value Date    LABALBU 3.5 10/12/2019      BUN/Cr:   BUN (mg/dL)   Date Value   10/12/2019 20   /  CREATININE (mg/dL)   Date Value   10/12/2019 0.7      BMI:    BMI Readings from Last 1 Encounters:   12/16/19 37.07 kg/m²        Coronary Artery Disease/HTN/CHF History: Yes- HTN managed by PCP and meds      Cardiologist: none    Cardiac Clearance Necessary: No    Date of Cardiac Clearance Appt: On Plavix? No,  If YES, when will they stop taking? Final Cardiac Recommendations:N/A   -On any anticoagulation-none       Diabetes History: No    Most Recent HgbA1C: 5.2    PCP or Endocrine Recommendations: N/A    Nutritionist/Dietician Consult Scheduled: N/A    Final Plan For Diabetic Control: N/A   Pulmonary: COPD/Emphysema/ Use of home oxygen: NONE     Alcohol use:none           DVT Risk Stratification:  HIGH-previous history of DVT in may of 2019- just finished eliquis- unsure of cause. Vascular Consult Ordered:  no    Date of Vascular Appt:     Hematology/Oncology Consult Ordered:  no    Date of Hematology/Oncology Appt:     Final Recommendation For DVT Prophylaxis:   -Smoking history or use of estrogen-none         BMI Greater than 40 at time of scheduling?: No    Has Surgeon been notified of BMI concern? Not Applicable    Weight Loss Clinic Consult Ordered: Not Applicable    Date of Wt Loss Clinic Appt:     BMI at time of surgery (if went through [facility]): Additional Medical Concerns:     None    Additional Recommendations for above concerns:      Discharge Disposition Information:     Attended Pre-Hab Program: NA      Anticipated Discharge Disposition:  SNF - pt would like to go to the [facility]                 Who will be with patient at home following discharge? Sister- lives with her. Pt moved up from [address] [place] [address]. Equipment pt already has:  Deric [place] and Shower Chair. Chair lift to second level in home                  Bedroom on first or second floor: Second- sleeping in recliner on first floor               Bathroom on first or second floor: Shower on second- took chair lift up to second floor.                Weight bearing status: Full              Pre-op ambulatory status: Walker              Number of entry steps: 4 steps into home               Caregiver assistance: Full time                        [facility]  1/20/2020

## 2020-02-04 ENCOUNTER — HOSPITAL ENCOUNTER (OUTPATIENT)
Dept: CT IMAGING | Age: 75
Discharge: HOME | End: 2020-02-04
Payer: MEDICARE

## 2020-02-04 PROCEDURE — 73700 CT LOWER EXTREMITY W/O DYE: CPT

## 2020-02-10 ENCOUNTER — TELEPHONE (OUTPATIENT)
Dept: ORTHOPEDIC SURGERY | Age: 75
End: 2020-02-10

## 2020-02-12 RX ORDER — FERROUS SULFATE 325(65) MG
325 TABLET ORAL
COMMUNITY
End: 2020-07-10

## 2020-02-12 RX ORDER — TRAMADOL HYDROCHLORIDE 50 MG/1
50 TABLET ORAL EVERY 6 HOURS PRN
Status: ON HOLD | COMMUNITY
End: 2020-02-21 | Stop reason: HOSPADM

## 2020-02-12 RX ORDER — ACETAMINOPHEN 325 MG/1
650 TABLET ORAL EVERY 6 HOURS PRN
COMMUNITY

## 2020-02-13 ENCOUNTER — TELEPHONE (OUTPATIENT)
Dept: ORTHOPEDIC SURGERY | Age: 75
End: 2020-02-13

## 2020-02-17 ENCOUNTER — ANESTHESIA EVENT (OUTPATIENT)
Dept: OPERATING ROOM | Age: 75
DRG: 470 | End: 2020-02-17
Payer: MEDICARE

## 2020-02-18 ENCOUNTER — HOSPITAL ENCOUNTER (OUTPATIENT)
Dept: GENERAL RADIOLOGY | Age: 75
Discharge: HOME | DRG: 470 | End: 2020-02-18
Attending: ORTHOPAEDIC SURGERY
Payer: MEDICARE

## 2020-02-18 ENCOUNTER — HOSPITAL ENCOUNTER (INPATIENT)
Age: 75
LOS: 3 days | Discharge: NURSING, REHAB OR LONG-TERM CARE | DRG: 470 | End: 2020-02-21
Attending: ORTHOPAEDIC SURGERY | Admitting: ORTHOPAEDIC SURGERY
Payer: MEDICARE

## 2020-02-18 ENCOUNTER — ANESTHESIA (OUTPATIENT)
Dept: OPERATING ROOM | Age: 75
DRG: 470 | End: 2020-02-18
Payer: MEDICARE

## 2020-02-18 VITALS
DIASTOLIC BLOOD PRESSURE: 64 MMHG | SYSTOLIC BLOOD PRESSURE: 113 MMHG | RESPIRATION RATE: 21 BRPM | TEMPERATURE: 98.2 F | OXYGEN SATURATION: 99 %

## 2020-02-18 PROBLEM — M16.12 LOCALIZED OSTEOARTHROSIS OF LEFT HIP: Status: ACTIVE | Noted: 2020-02-18

## 2020-02-18 LAB
ABO/RH: NORMAL
ANTIBODY SCREEN: NORMAL
GLUCOSE BLD-MCNC: 230 MG/DL (ref 70–99)
PERFORMED ON: ABNORMAL
TRANSFERRIN: 238 MG/DL (ref 200–360)

## 2020-02-18 PROCEDURE — 2500000003 HC RX 250 WO HCPCS: Performed by: ORTHOPAEDIC SURGERY

## 2020-02-18 PROCEDURE — 2580000003 HC RX 258: Performed by: ORTHOPAEDIC SURGERY

## 2020-02-18 PROCEDURE — 3700000001 HC ADD 15 MINUTES (ANESTHESIA): Performed by: ORTHOPAEDIC SURGERY

## 2020-02-18 PROCEDURE — 1200000000 HC SEMI PRIVATE

## 2020-02-18 PROCEDURE — 3600000015 HC SURGERY LEVEL 5 ADDTL 15MIN: Performed by: ORTHOPAEDIC SURGERY

## 2020-02-18 PROCEDURE — 86901 BLOOD TYPING SEROLOGIC RH(D): CPT

## 2020-02-18 PROCEDURE — 6360000002 HC RX W HCPCS: Performed by: PHYSICIAN ASSISTANT

## 2020-02-18 PROCEDURE — 64450 NJX AA&/STRD OTHER PN/BRANCH: CPT | Performed by: ANESTHESIOLOGY

## 2020-02-18 PROCEDURE — 88304 TISSUE EXAM BY PATHOLOGIST: CPT

## 2020-02-18 PROCEDURE — 7100000000 HC PACU RECOVERY - FIRST 15 MIN: Performed by: ORTHOPAEDIC SURGERY

## 2020-02-18 PROCEDURE — 94761 N-INVAS EAR/PLS OXIMETRY MLT: CPT

## 2020-02-18 PROCEDURE — 88311 DECALCIFY TISSUE: CPT

## 2020-02-18 PROCEDURE — 6360000002 HC RX W HCPCS: Performed by: NURSE ANESTHETIST, CERTIFIED REGISTERED

## 2020-02-18 PROCEDURE — 2700000000 HC OXYGEN THERAPY PER DAY

## 2020-02-18 PROCEDURE — 8E0Y0CZ ROBOTIC ASSISTED PROCEDURE OF LOWER EXTREMITY, OPEN APPROACH: ICD-10-PCS | Performed by: ORTHOPAEDIC SURGERY

## 2020-02-18 PROCEDURE — 2720000010 HC SURG SUPPLY STERILE: Performed by: ORTHOPAEDIC SURGERY

## 2020-02-18 PROCEDURE — 84466 ASSAY OF TRANSFERRIN: CPT

## 2020-02-18 PROCEDURE — 3E0T3BZ INTRODUCTION OF ANESTHETIC AGENT INTO PERIPHERAL NERVES AND PLEXI, PERCUTANEOUS APPROACH: ICD-10-PCS | Performed by: ORTHOPAEDIC SURGERY

## 2020-02-18 PROCEDURE — 2500000003 HC RX 250 WO HCPCS: Performed by: ANESTHESIOLOGY

## 2020-02-18 PROCEDURE — 0SRB0J9 REPLACEMENT OF LEFT HIP JOINT WITH SYNTHETIC SUBSTITUTE, CEMENTED, OPEN APPROACH: ICD-10-PCS | Performed by: ORTHOPAEDIC SURGERY

## 2020-02-18 PROCEDURE — 6370000000 HC RX 637 (ALT 250 FOR IP): Performed by: ANESTHESIOLOGY

## 2020-02-18 PROCEDURE — 6370000000 HC RX 637 (ALT 250 FOR IP): Performed by: PHYSICIAN ASSISTANT

## 2020-02-18 PROCEDURE — 73502 X-RAY EXAM HIP UNI 2-3 VIEWS: CPT

## 2020-02-18 PROCEDURE — 51798 US URINE CAPACITY MEASURE: CPT

## 2020-02-18 PROCEDURE — 3209999900 FLUORO FOR SURGICAL PROCEDURES

## 2020-02-18 PROCEDURE — 86850 RBC ANTIBODY SCREEN: CPT

## 2020-02-18 PROCEDURE — 3700000000 HC ANESTHESIA ATTENDED CARE: Performed by: ORTHOPAEDIC SURGERY

## 2020-02-18 PROCEDURE — 2709999900 HC NON-CHARGEABLE SUPPLY: Performed by: ORTHOPAEDIC SURGERY

## 2020-02-18 PROCEDURE — 3600000005 HC SURGERY LEVEL 5 BASE: Performed by: ORTHOPAEDIC SURGERY

## 2020-02-18 PROCEDURE — 2580000003 HC RX 258: Performed by: PHYSICIAN ASSISTANT

## 2020-02-18 PROCEDURE — 7100000001 HC PACU RECOVERY - ADDTL 15 MIN: Performed by: ORTHOPAEDIC SURGERY

## 2020-02-18 PROCEDURE — 6360000002 HC RX W HCPCS: Performed by: ANESTHESIOLOGY

## 2020-02-18 PROCEDURE — 2500000003 HC RX 250 WO HCPCS

## 2020-02-18 PROCEDURE — 86900 BLOOD TYPING SEROLOGIC ABO: CPT

## 2020-02-18 PROCEDURE — 2580000003 HC RX 258: Performed by: ANESTHESIOLOGY

## 2020-02-18 PROCEDURE — C1713 ANCHOR/SCREW BN/BN,TIS/BN: HCPCS | Performed by: ORTHOPAEDIC SURGERY

## 2020-02-18 PROCEDURE — 6360000002 HC RX W HCPCS: Performed by: ORTHOPAEDIC SURGERY

## 2020-02-18 PROCEDURE — C9290 INJ, BUPIVACAINE LIPOSOME: HCPCS | Performed by: ORTHOPAEDIC SURGERY

## 2020-02-18 PROCEDURE — 2500000003 HC RX 250 WO HCPCS: Performed by: NURSE ANESTHETIST, CERTIFIED REGISTERED

## 2020-02-18 PROCEDURE — C1776 JOINT DEVICE (IMPLANTABLE): HCPCS | Performed by: ORTHOPAEDIC SURGERY

## 2020-02-18 DEVICE — LINER ACET SZ E ID36MM THK5.9MM 0DEG HIP X3 LOK RNG FOR: Type: IMPLANTABLE DEVICE | Site: HIP | Status: FUNCTIONAL

## 2020-02-18 DEVICE — HEAD FEM DIA36MM +5MM OFFSET HIP CO CHROM POLY TAPR LO FRIC: Type: IMPLANTABLE DEVICE | Site: HIP | Status: FUNCTIONAL

## 2020-02-18 DEVICE — SCREW BNE L20MM DIA65MM LO PROF HEX TRIDENT LL: Type: IMPLANTABLE DEVICE | Site: HIP | Status: FUNCTIONAL

## 2020-02-18 DEVICE — SHELL ACET SZ E DIA52MM 5 CLUS H TRITANIUM PRESSFIT PRI: Type: IMPLANTABLE DEVICE | Site: HIP | Status: FUNCTIONAL

## 2020-02-18 RX ORDER — FENTANYL CITRATE 50 UG/ML
INJECTION, SOLUTION INTRAMUSCULAR; INTRAVENOUS PRN
Status: DISCONTINUED | OUTPATIENT
Start: 2020-02-18 | End: 2020-02-18 | Stop reason: SDUPTHER

## 2020-02-18 RX ORDER — CELECOXIB 100 MG/1
100 CAPSULE ORAL 2 TIMES DAILY
Status: DISCONTINUED | OUTPATIENT
Start: 2020-02-18 | End: 2020-02-21 | Stop reason: HOSPADM

## 2020-02-18 RX ORDER — ONDANSETRON 2 MG/ML
4 INJECTION INTRAMUSCULAR; INTRAVENOUS
Status: COMPLETED | OUTPATIENT
Start: 2020-02-18 | End: 2020-02-18

## 2020-02-18 RX ORDER — OXYCODONE HYDROCHLORIDE 5 MG/1
5 TABLET ORAL EVERY 4 HOURS PRN
Status: DISCONTINUED | OUTPATIENT
Start: 2020-02-18 | End: 2020-02-21 | Stop reason: HOSPADM

## 2020-02-18 RX ORDER — SODIUM CHLORIDE 0.9 % (FLUSH) 0.9 %
10 SYRINGE (ML) INJECTION EVERY 12 HOURS SCHEDULED
Status: DISCONTINUED | OUTPATIENT
Start: 2020-02-18 | End: 2020-02-21 | Stop reason: HOSPADM

## 2020-02-18 RX ORDER — CYCLOBENZAPRINE HCL 10 MG
10 TABLET ORAL 3 TIMES DAILY PRN
Status: DISCONTINUED | OUTPATIENT
Start: 2020-02-18 | End: 2020-02-21 | Stop reason: HOSPADM

## 2020-02-18 RX ORDER — SODIUM CHLORIDE 0.9 % (FLUSH) 0.9 %
10 SYRINGE (ML) INJECTION EVERY 12 HOURS SCHEDULED
Status: DISCONTINUED | OUTPATIENT
Start: 2020-02-18 | End: 2020-02-18 | Stop reason: HOSPADM

## 2020-02-18 RX ORDER — OXYCODONE HYDROCHLORIDE 5 MG/1
10 TABLET ORAL PRN
Status: DISCONTINUED | OUTPATIENT
Start: 2020-02-18 | End: 2020-02-18 | Stop reason: HOSPADM

## 2020-02-18 RX ORDER — SODIUM CHLORIDE, SODIUM LACTATE, POTASSIUM CHLORIDE, CALCIUM CHLORIDE 600; 310; 30; 20 MG/100ML; MG/100ML; MG/100ML; MG/100ML
INJECTION, SOLUTION INTRAVENOUS CONTINUOUS
Status: DISCONTINUED | OUTPATIENT
Start: 2020-02-18 | End: 2020-02-21 | Stop reason: HOSPADM

## 2020-02-18 RX ORDER — HYDROMORPHONE HCL 110MG/55ML
PATIENT CONTROLLED ANALGESIA SYRINGE INTRAVENOUS PRN
Status: DISCONTINUED | OUTPATIENT
Start: 2020-02-18 | End: 2020-02-18 | Stop reason: SDUPTHER

## 2020-02-18 RX ORDER — ONDANSETRON 2 MG/ML
INJECTION INTRAMUSCULAR; INTRAVENOUS PRN
Status: DISCONTINUED | OUTPATIENT
Start: 2020-02-18 | End: 2020-02-18 | Stop reason: SDUPTHER

## 2020-02-18 RX ORDER — DIPHENHYDRAMINE HCL 25 MG
25 CAPSULE ORAL EVERY 6 HOURS PRN
COMMUNITY
End: 2020-07-10

## 2020-02-18 RX ORDER — KETOROLAC TROMETHAMINE 30 MG/ML
15 INJECTION, SOLUTION INTRAMUSCULAR; INTRAVENOUS EVERY 6 HOURS PRN
Status: DISCONTINUED | OUTPATIENT
Start: 2020-02-18 | End: 2020-02-21 | Stop reason: HOSPADM

## 2020-02-18 RX ORDER — OXYCODONE HYDROCHLORIDE 5 MG/1
5 TABLET ORAL PRN
Status: DISCONTINUED | OUTPATIENT
Start: 2020-02-18 | End: 2020-02-18 | Stop reason: HOSPADM

## 2020-02-18 RX ORDER — DIPHENHYDRAMINE HCL 25 MG
25 TABLET ORAL EVERY 6 HOURS PRN
Status: DISCONTINUED | OUTPATIENT
Start: 2020-02-18 | End: 2020-02-21 | Stop reason: HOSPADM

## 2020-02-18 RX ORDER — HYDRALAZINE HYDROCHLORIDE 20 MG/ML
5 INJECTION INTRAMUSCULAR; INTRAVENOUS EVERY 10 MIN PRN
Status: DISCONTINUED | OUTPATIENT
Start: 2020-02-18 | End: 2020-02-18 | Stop reason: HOSPADM

## 2020-02-18 RX ORDER — GABAPENTIN 300 MG/1
300 CAPSULE ORAL 3 TIMES DAILY
Status: DISCONTINUED | OUTPATIENT
Start: 2020-02-18 | End: 2020-02-21 | Stop reason: HOSPADM

## 2020-02-18 RX ORDER — LABETALOL HYDROCHLORIDE 5 MG/ML
5 INJECTION, SOLUTION INTRAVENOUS EVERY 10 MIN PRN
Status: DISCONTINUED | OUTPATIENT
Start: 2020-02-18 | End: 2020-02-18 | Stop reason: HOSPADM

## 2020-02-18 RX ORDER — PROMETHAZINE HYDROCHLORIDE 25 MG/ML
6.25 INJECTION, SOLUTION INTRAMUSCULAR; INTRAVENOUS
Status: COMPLETED | OUTPATIENT
Start: 2020-02-18 | End: 2020-02-18

## 2020-02-18 RX ORDER — ACETAMINOPHEN 325 MG/1
650 TABLET ORAL EVERY 6 HOURS
Status: DISCONTINUED | OUTPATIENT
Start: 2020-02-18 | End: 2020-02-21 | Stop reason: HOSPADM

## 2020-02-18 RX ORDER — MORPHINE SULFATE 2 MG/ML
2 INJECTION, SOLUTION INTRAMUSCULAR; INTRAVENOUS
Status: DISCONTINUED | OUTPATIENT
Start: 2020-02-18 | End: 2020-02-21 | Stop reason: HOSPADM

## 2020-02-18 RX ORDER — SODIUM CHLORIDE 0.9 % (FLUSH) 0.9 %
10 SYRINGE (ML) INJECTION PRN
Status: DISCONTINUED | OUTPATIENT
Start: 2020-02-18 | End: 2020-02-21 | Stop reason: HOSPADM

## 2020-02-18 RX ORDER — SODIUM CHLORIDE, SODIUM LACTATE, POTASSIUM CHLORIDE, CALCIUM CHLORIDE 600; 310; 30; 20 MG/100ML; MG/100ML; MG/100ML; MG/100ML
INJECTION, SOLUTION INTRAVENOUS CONTINUOUS
Status: DISCONTINUED | OUTPATIENT
Start: 2020-02-18 | End: 2020-02-18

## 2020-02-18 RX ORDER — VANCOMYCIN HYDROCHLORIDE 1 G/20ML
INJECTION, POWDER, LYOPHILIZED, FOR SOLUTION INTRAVENOUS PRN
Status: DISCONTINUED | OUTPATIENT
Start: 2020-02-18 | End: 2020-02-18 | Stop reason: HOSPADM

## 2020-02-18 RX ORDER — FENTANYL CITRATE 50 UG/ML
25 INJECTION, SOLUTION INTRAMUSCULAR; INTRAVENOUS EVERY 5 MIN PRN
Status: DISCONTINUED | OUTPATIENT
Start: 2020-02-18 | End: 2020-02-18 | Stop reason: HOSPADM

## 2020-02-18 RX ORDER — NICOTINE POLACRILEX 4 MG
15 LOZENGE BUCCAL PRN
Status: DISCONTINUED | OUTPATIENT
Start: 2020-02-18 | End: 2020-02-21 | Stop reason: HOSPADM

## 2020-02-18 RX ORDER — FERROUS SULFATE 325(65) MG
325 TABLET ORAL
Status: DISCONTINUED | OUTPATIENT
Start: 2020-02-19 | End: 2020-02-21 | Stop reason: HOSPADM

## 2020-02-18 RX ORDER — LIDOCAINE HYDROCHLORIDE 10 MG/ML
0.3 INJECTION, SOLUTION EPIDURAL; INFILTRATION; INTRACAUDAL; PERINEURAL
Status: DISCONTINUED | OUTPATIENT
Start: 2020-02-18 | End: 2020-02-18 | Stop reason: HOSPADM

## 2020-02-18 RX ORDER — DEXAMETHASONE SODIUM PHOSPHATE 4 MG/ML
8 INJECTION, SOLUTION INTRA-ARTICULAR; INTRALESIONAL; INTRAMUSCULAR; INTRAVENOUS; SOFT TISSUE ONCE
Status: DISCONTINUED | OUTPATIENT
Start: 2020-02-18 | End: 2020-02-18 | Stop reason: HOSPADM

## 2020-02-18 RX ORDER — ONDANSETRON 2 MG/ML
4 INJECTION INTRAMUSCULAR; INTRAVENOUS EVERY 6 HOURS PRN
Status: DISCONTINUED | OUTPATIENT
Start: 2020-02-18 | End: 2020-02-21 | Stop reason: HOSPADM

## 2020-02-18 RX ORDER — DEXTROSE MONOHYDRATE 50 MG/ML
100 INJECTION, SOLUTION INTRAVENOUS PRN
Status: DISCONTINUED | OUTPATIENT
Start: 2020-02-18 | End: 2020-02-21 | Stop reason: HOSPADM

## 2020-02-18 RX ORDER — FENTANYL CITRATE 50 UG/ML
50 INJECTION, SOLUTION INTRAMUSCULAR; INTRAVENOUS EVERY 5 MIN PRN
Status: DISCONTINUED | OUTPATIENT
Start: 2020-02-18 | End: 2020-02-18 | Stop reason: HOSPADM

## 2020-02-18 RX ORDER — PANTOPRAZOLE SODIUM 40 MG/1
40 TABLET, DELAYED RELEASE ORAL EVERY MORNING
Status: DISCONTINUED | OUTPATIENT
Start: 2020-02-19 | End: 2020-02-21 | Stop reason: HOSPADM

## 2020-02-18 RX ORDER — ROCURONIUM BROMIDE 10 MG/ML
INJECTION, SOLUTION INTRAVENOUS PRN
Status: DISCONTINUED | OUTPATIENT
Start: 2020-02-18 | End: 2020-02-18 | Stop reason: SDUPTHER

## 2020-02-18 RX ORDER — MORPHINE SULFATE 4 MG/ML
4 INJECTION, SOLUTION INTRAMUSCULAR; INTRAVENOUS
Status: DISCONTINUED | OUTPATIENT
Start: 2020-02-18 | End: 2020-02-21 | Stop reason: HOSPADM

## 2020-02-18 RX ORDER — PROPOFOL 10 MG/ML
INJECTION, EMULSION INTRAVENOUS PRN
Status: DISCONTINUED | OUTPATIENT
Start: 2020-02-18 | End: 2020-02-18 | Stop reason: SDUPTHER

## 2020-02-18 RX ORDER — DOCUSATE SODIUM 100 MG/1
100 CAPSULE, LIQUID FILLED ORAL 2 TIMES DAILY
Status: DISCONTINUED | OUTPATIENT
Start: 2020-02-18 | End: 2020-02-21 | Stop reason: HOSPADM

## 2020-02-18 RX ORDER — GABAPENTIN 300 MG/1
300 CAPSULE ORAL ONCE
Status: COMPLETED | OUTPATIENT
Start: 2020-02-18 | End: 2020-02-18

## 2020-02-18 RX ORDER — SENNA AND DOCUSATE SODIUM 50; 8.6 MG/1; MG/1
1 TABLET, FILM COATED ORAL 2 TIMES DAILY
Status: DISCONTINUED | OUTPATIENT
Start: 2020-02-18 | End: 2020-02-21 | Stop reason: HOSPADM

## 2020-02-18 RX ORDER — SODIUM CHLORIDE 0.9 % (FLUSH) 0.9 %
10 SYRINGE (ML) INJECTION PRN
Status: DISCONTINUED | OUTPATIENT
Start: 2020-02-18 | End: 2020-02-18 | Stop reason: HOSPADM

## 2020-02-18 RX ORDER — CELECOXIB 100 MG/1
200 CAPSULE ORAL ONCE
Status: COMPLETED | OUTPATIENT
Start: 2020-02-18 | End: 2020-02-18

## 2020-02-18 RX ORDER — DEXTROSE MONOHYDRATE 25 G/50ML
12.5 INJECTION, SOLUTION INTRAVENOUS PRN
Status: DISCONTINUED | OUTPATIENT
Start: 2020-02-18 | End: 2020-02-21 | Stop reason: HOSPADM

## 2020-02-18 RX ORDER — ACETAMINOPHEN 500 MG
1000 TABLET ORAL ONCE
Status: COMPLETED | OUTPATIENT
Start: 2020-02-18 | End: 2020-02-18

## 2020-02-18 RX ORDER — LIDOCAINE HYDROCHLORIDE 20 MG/ML
INJECTION, SOLUTION INFILTRATION; PERINEURAL PRN
Status: DISCONTINUED | OUTPATIENT
Start: 2020-02-18 | End: 2020-02-18 | Stop reason: SDUPTHER

## 2020-02-18 RX ORDER — DEXAMETHASONE SODIUM PHOSPHATE 10 MG/ML
INJECTION INTRAMUSCULAR; INTRAVENOUS PRN
Status: DISCONTINUED | OUTPATIENT
Start: 2020-02-18 | End: 2020-02-18 | Stop reason: SDUPTHER

## 2020-02-18 RX ORDER — OXYCODONE HYDROCHLORIDE 5 MG/1
10 TABLET ORAL EVERY 4 HOURS PRN
Status: DISCONTINUED | OUTPATIENT
Start: 2020-02-18 | End: 2020-02-21 | Stop reason: HOSPADM

## 2020-02-18 RX ADMIN — DEXAMETHASONE SODIUM PHOSPHATE 8 MG: 10 INJECTION INTRAMUSCULAR; INTRAVENOUS at 11:38

## 2020-02-18 RX ADMIN — SODIUM CHLORIDE, POTASSIUM CHLORIDE, SODIUM LACTATE AND CALCIUM CHLORIDE: 600; 310; 30; 20 INJECTION, SOLUTION INTRAVENOUS at 18:42

## 2020-02-18 RX ADMIN — CELECOXIB 200 MG: 100 CAPSULE ORAL at 10:16

## 2020-02-18 RX ADMIN — FENTANYL CITRATE 50 MCG: 50 INJECTION, SOLUTION INTRAMUSCULAR; INTRAVENOUS at 15:32

## 2020-02-18 RX ADMIN — HYDROMORPHONE HYDROCHLORIDE 0.5 MG: 2 INJECTION INTRAMUSCULAR; INTRAVENOUS; SUBCUTANEOUS at 14:20

## 2020-02-18 RX ADMIN — FENTANYL CITRATE 50 MCG: 50 INJECTION INTRAMUSCULAR; INTRAVENOUS at 12:29

## 2020-02-18 RX ADMIN — ONDANSETRON 4 MG: 2 INJECTION INTRAMUSCULAR; INTRAVENOUS at 14:47

## 2020-02-18 RX ADMIN — LABETALOL HYDROCHLORIDE 5 MG: 5 INJECTION INTRAVENOUS at 16:56

## 2020-02-18 RX ADMIN — ACETAMINOPHEN 1000 MG: 500 TABLET ORAL at 10:16

## 2020-02-18 RX ADMIN — SUGAMMADEX 200 MG: 100 INJECTION, SOLUTION INTRAVENOUS at 14:13

## 2020-02-18 RX ADMIN — LABETALOL HYDROCHLORIDE 5 MG: 5 INJECTION INTRAVENOUS at 17:03

## 2020-02-18 RX ADMIN — CEFAZOLIN 2 G: 10 INJECTION, POWDER, FOR SOLUTION INTRAVENOUS at 20:59

## 2020-02-18 RX ADMIN — HYDROMORPHONE HYDROCHLORIDE 1 MG: 2 INJECTION INTRAMUSCULAR; INTRAVENOUS; SUBCUTANEOUS at 14:22

## 2020-02-18 RX ADMIN — ROCURONIUM BROMIDE 50 MG: 10 SOLUTION INTRAVENOUS at 11:30

## 2020-02-18 RX ADMIN — CELECOXIB 100 MG: 100 CAPSULE ORAL at 21:19

## 2020-02-18 RX ADMIN — SODIUM CHLORIDE, POTASSIUM CHLORIDE, SODIUM LACTATE AND CALCIUM CHLORIDE: 600; 310; 30; 20 INJECTION, SOLUTION INTRAVENOUS at 14:18

## 2020-02-18 RX ADMIN — ONDANSETRON 4 MG: 2 INJECTION INTRAMUSCULAR; INTRAVENOUS at 11:38

## 2020-02-18 RX ADMIN — HYDROMORPHONE HYDROCHLORIDE 0.5 MG: 2 INJECTION INTRAMUSCULAR; INTRAVENOUS; SUBCUTANEOUS at 13:46

## 2020-02-18 RX ADMIN — CEFAZOLIN 2 G: 10 INJECTION, POWDER, FOR SOLUTION INTRAVENOUS; PARENTERAL at 11:25

## 2020-02-18 RX ADMIN — PROMETHAZINE HYDROCHLORIDE 6.25 MG: 25 INJECTION INTRAMUSCULAR; INTRAVENOUS at 14:53

## 2020-02-18 RX ADMIN — LABETALOL HYDROCHLORIDE 5 MG: 5 INJECTION INTRAVENOUS at 17:08

## 2020-02-18 RX ADMIN — PROPOFOL 150 MG: 10 INJECTION, EMULSION INTRAVENOUS at 11:30

## 2020-02-18 RX ADMIN — SODIUM CHLORIDE, POTASSIUM CHLORIDE, SODIUM LACTATE AND CALCIUM CHLORIDE: 600; 310; 30; 20 INJECTION, SOLUTION INTRAVENOUS at 11:20

## 2020-02-18 RX ADMIN — GABAPENTIN 300 MG: 300 CAPSULE ORAL at 21:19

## 2020-02-18 RX ADMIN — OXYCODONE 10 MG: 5 TABLET ORAL at 21:23

## 2020-02-18 RX ADMIN — GABAPENTIN 300 MG: 300 CAPSULE ORAL at 10:16

## 2020-02-18 RX ADMIN — FENTANYL CITRATE 100 MCG: 50 INJECTION INTRAMUSCULAR; INTRAVENOUS at 11:30

## 2020-02-18 RX ADMIN — LIDOCAINE HYDROCHLORIDE 80 MG: 20 INJECTION, SOLUTION INFILTRATION; PERINEURAL at 11:30

## 2020-02-18 RX ADMIN — FENTANYL CITRATE 50 MCG: 50 INJECTION, SOLUTION INTRAMUSCULAR; INTRAVENOUS at 14:38

## 2020-02-18 RX ADMIN — DOCUSATE SODIUM 100 MG: 100 CAPSULE, LIQUID FILLED ORAL at 21:19

## 2020-02-18 RX ADMIN — ACETAMINOPHEN 650 MG: 325 TABLET ORAL at 21:20

## 2020-02-18 RX ADMIN — FENTANYL CITRATE 100 MCG: 50 INJECTION INTRAMUSCULAR; INTRAVENOUS at 12:05

## 2020-02-18 RX ADMIN — ROCURONIUM BROMIDE 20 MG: 10 SOLUTION INTRAVENOUS at 12:30

## 2020-02-18 RX ADMIN — INSULIN LISPRO 1 UNITS: 100 INJECTION, SOLUTION INTRAVENOUS; SUBCUTANEOUS at 23:56

## 2020-02-18 RX ADMIN — FENTANYL CITRATE 50 MCG: 50 INJECTION, SOLUTION INTRAMUSCULAR; INTRAVENOUS at 14:40

## 2020-02-18 ASSESSMENT — PAIN DESCRIPTION - FREQUENCY
FREQUENCY: CONTINUOUS

## 2020-02-18 ASSESSMENT — PULMONARY FUNCTION TESTS
PIF_VALUE: 23
PIF_VALUE: 21
PIF_VALUE: 22
PIF_VALUE: 1
PIF_VALUE: 0
PIF_VALUE: 22
PIF_VALUE: 0
PIF_VALUE: 24
PIF_VALUE: 21
PIF_VALUE: 1
PIF_VALUE: 19
PIF_VALUE: 22
PIF_VALUE: 22
PIF_VALUE: 21
PIF_VALUE: 22
PIF_VALUE: 21
PIF_VALUE: 22
PIF_VALUE: 20
PIF_VALUE: 22
PIF_VALUE: 21
PIF_VALUE: 21
PIF_VALUE: 24
PIF_VALUE: 5
PIF_VALUE: 21
PIF_VALUE: 21
PIF_VALUE: 20
PIF_VALUE: 21
PIF_VALUE: 21
PIF_VALUE: 12
PIF_VALUE: 22
PIF_VALUE: 19
PIF_VALUE: 20
PIF_VALUE: 20
PIF_VALUE: 1
PIF_VALUE: 22
PIF_VALUE: 22
PIF_VALUE: 21
PIF_VALUE: 19
PIF_VALUE: 22
PIF_VALUE: 22
PIF_VALUE: 21
PIF_VALUE: 21
PIF_VALUE: 20
PIF_VALUE: 19
PIF_VALUE: 21
PIF_VALUE: 21
PIF_VALUE: 22
PIF_VALUE: 22
PIF_VALUE: 20
PIF_VALUE: 0
PIF_VALUE: 21
PIF_VALUE: 22
PIF_VALUE: 21
PIF_VALUE: 22
PIF_VALUE: 21
PIF_VALUE: 20
PIF_VALUE: 1
PIF_VALUE: 21
PIF_VALUE: 21
PIF_VALUE: 22
PIF_VALUE: 21
PIF_VALUE: 20
PIF_VALUE: 21
PIF_VALUE: 21
PIF_VALUE: 22
PIF_VALUE: 23
PIF_VALUE: 21
PIF_VALUE: 23
PIF_VALUE: 20
PIF_VALUE: 22
PIF_VALUE: 22
PIF_VALUE: 21
PIF_VALUE: 21
PIF_VALUE: 22
PIF_VALUE: 20
PIF_VALUE: 20
PIF_VALUE: 21
PIF_VALUE: 22
PIF_VALUE: 21
PIF_VALUE: 21
PIF_VALUE: 22
PIF_VALUE: 21
PIF_VALUE: 20
PIF_VALUE: 20
PIF_VALUE: 21
PIF_VALUE: 22
PIF_VALUE: 10
PIF_VALUE: 22
PIF_VALUE: 21
PIF_VALUE: 21
PIF_VALUE: 19
PIF_VALUE: 21
PIF_VALUE: 19
PIF_VALUE: 21
PIF_VALUE: 20
PIF_VALUE: 21
PIF_VALUE: 22
PIF_VALUE: 22
PIF_VALUE: 21
PIF_VALUE: 22
PIF_VALUE: 22
PIF_VALUE: 21
PIF_VALUE: 22
PIF_VALUE: 23
PIF_VALUE: 1
PIF_VALUE: 21
PIF_VALUE: 21
PIF_VALUE: 33
PIF_VALUE: 21
PIF_VALUE: 21
PIF_VALUE: 1
PIF_VALUE: 21
PIF_VALUE: 23
PIF_VALUE: 23
PIF_VALUE: 22
PIF_VALUE: 22
PIF_VALUE: 21
PIF_VALUE: 22
PIF_VALUE: 21
PIF_VALUE: 22
PIF_VALUE: 23
PIF_VALUE: 28
PIF_VALUE: 22
PIF_VALUE: 22
PIF_VALUE: 21
PIF_VALUE: 22
PIF_VALUE: 21
PIF_VALUE: 21
PIF_VALUE: 22
PIF_VALUE: 22
PIF_VALUE: 23
PIF_VALUE: 21
PIF_VALUE: 22
PIF_VALUE: 3
PIF_VALUE: 20
PIF_VALUE: 21
PIF_VALUE: 20
PIF_VALUE: 20
PIF_VALUE: 22
PIF_VALUE: 22
PIF_VALUE: 10
PIF_VALUE: 20
PIF_VALUE: 22
PIF_VALUE: 22
PIF_VALUE: 1
PIF_VALUE: 23
PIF_VALUE: 20
PIF_VALUE: 26
PIF_VALUE: 20
PIF_VALUE: 21
PIF_VALUE: 22
PIF_VALUE: 22
PIF_VALUE: 1
PIF_VALUE: 22
PIF_VALUE: 21
PIF_VALUE: 23
PIF_VALUE: 20
PIF_VALUE: 21

## 2020-02-18 ASSESSMENT — PAIN DESCRIPTION - PAIN TYPE
TYPE: SURGICAL PAIN

## 2020-02-18 ASSESSMENT — PAIN SCALES - GENERAL
PAINLEVEL_OUTOF10: 10
PAINLEVEL_OUTOF10: 10
PAINLEVEL_OUTOF10: 4
PAINLEVEL_OUTOF10: 5
PAINLEVEL_OUTOF10: 5
PAINLEVEL_OUTOF10: 7
PAINLEVEL_OUTOF10: 7
PAINLEVEL_OUTOF10: 10

## 2020-02-18 ASSESSMENT — PAIN DESCRIPTION - LOCATION
LOCATION: HIP

## 2020-02-18 ASSESSMENT — PAIN DESCRIPTION - PROGRESSION: CLINICAL_PROGRESSION: RAPIDLY IMPROVING

## 2020-02-18 ASSESSMENT — PAIN DESCRIPTION - DESCRIPTORS
DESCRIPTORS: THROBBING
DESCRIPTORS: ACHING;CONSTANT

## 2020-02-18 ASSESSMENT — PAIN DESCRIPTION - ORIENTATION
ORIENTATION: LEFT

## 2020-02-18 ASSESSMENT — PAIN - FUNCTIONAL ASSESSMENT
PAIN_FUNCTIONAL_ASSESSMENT: PREVENTS OR INTERFERES WITH MANY ACTIVE NOT PASSIVE ACTIVITIES
PAIN_FUNCTIONAL_ASSESSMENT: 0-10

## 2020-02-18 NOTE — CONSULTS
Reviewed chart. The patient has few medical problems. Hospital medicine will sign off at this point but please feel free to call if we can be of assistance. Discussed with nursing.

## 2020-02-18 NOTE — ANESTHESIA PRE PROCEDURE
On Call to [address] [place] [address]        tranexamic acid (CYKLOKAPRON) 1,000 mg in dextrose 5 % 100 mL IVPB  1,000 mg Intravenous On Call to [address] [place] [address]        Dexamethasone Sodium Phosphate injection 8 mg  8 mg Intravenous Once [place]        lactated ringers infusion   Intravenous Continuous Olman Sanchez MD        sodium chloride flush 0.9 % injection 10 mL  10 mL Intravenous 2 times per day Olman Sanchez MD        sodium chloride flush 0.9 % injection 10 mL  10 mL Intravenous PRN Olman Sanchez MD        lidocaine PF 1 % injection 0.3 mL  0.3 mL Intradermal Once PRN Olman Sanchez MD        acetaminophen (TYLENOL) tablet 1,000 mg  1,000 mg Oral Once Joanne Lantigua MD        celecoxib (CELEBREX) capsule 200 mg  200 mg Oral Once Joanne Lantigua MD        gabapentin (NEURONTIN) capsule 300 mg  300 mg Oral Once Joanne Lantigua MD        fentaNYL (SUBLIMAZE) injection 25 mcg  25 mcg Intravenous Q5 Min PRN Joanne Lantigua MD        fentaNYL (SUBLIMAZE) injection 50 mcg  50 mcg Intravenous Q5 Min PRN Joanne Lantigua MD        HYDROmorphone (DILAUDID) injection 0.25 mg  0.25 mg Intravenous Q5 Min PRN Joanne Lantigua MD        HYDROmorphone (DILAUDID) injection 0.5 mg  0.5 mg Intravenous Q5 Min PRN Joanne Lantigua MD        oxyCODONE (ROXICODONE) immediate release tablet 5 mg  5 mg Oral PRN Joanne Lantigua MD        Or    oxyCODONE (ROXICODONE) immediate release tablet 10 mg  10 mg Oral PRN Joanne Lantigua MD        ondansetron [facility]) injection 4 mg  4 mg Intravenous Once PRN Joanne Lantigua MD        promethazine [facility]) injection 6.25 mg  6.25 mg Intravenous Once PRN Joanne Lantigua MD        labetalol (NORMODYNE;TRANDATE) injection 5 mg  5 mg Intravenous Q10 Min PRN Joanne Lantigua MD        hydrALAZINE (APRESOLINE) injection 5 mg  5 mg Intravenous Q10 Min PRN Kavon Elizondo MD        meperidine (DEMEROL) injection SOLN 12.5 mg  12.5 mg Intravenous Q5 Min PRN Kavon Elizondo MD           Allergies:  No Known Allergies    Problem List:    Patient Active Problem List   Diagnosis Code    Right hip pain M25.551    Fall on same level from tripping as cause of accidental injury W01. 0XXA    Primary localized osteoarthritis of right hip M16.11    Status post total hip replacement, right Z96.641       Past Medical History:        Diagnosis Date    Deep vein thrombosis (DVT) (HCC)     RLE    Degenerative disc disease, lumbar     Hx of blood clots     Neuropathy        Past Surgical History:        Procedure Laterality Date    HIP SURGERY      HYSTERECTOMY      JOINT REPLACEMENT      TOTAL HIP ARTHROPLASTY Right 10/9/2019    RIGHT LATERAL TOTAL HIP MAKOPLASTY WITH CELLSAVER, [address] PAIN CONTROL     ITZEL BINDU  CPT CODE - 42259, 14623 performed by Nadya Wiley MD at [place] [address] History:    Social History     Tobacco Use    Smoking status: Never Smoker    Smokeless tobacco: Never Used   Substance Use Topics    Alcohol use: Never     Frequency: Never                                Counseling given: Not Answered      Vital Signs (Current):   Vitals:    02/12/20 1129 02/18/20 0939   BP:  (!) 137/90   Pulse:  91   Resp:  16   Temp:  98.6 °F (37 °C)   TempSrc:  Temporal   SpO2:  100%   Weight: 213 lb (96.6 kg) 213 lb (96.6 kg)   Height: 5' 5\" (1.651 m) 5' 5\" (1.651 m)                                              BP Readings from Last 3 Encounters:   02/18/20 (!) 137/90   10/12/19 (!) 123/53   10/11/19 134/82       NPO Status:                                                                                 BMI:   Wt Readings from Last 3 Encounters:   02/18/20 213 lb (96.6 kg)   12/16/19 216 lb 0.8 oz (98 kg)   10/12/19 216 lb (98 kg)     Body mass index is 35.45 kg/m².     CBC:   Lab Results   Component Value Date    WBC 11.4 10/12/2019    RBC 3.26 10/12/2019    HGB 10.3 10/12/2019    HCT 30.1 10/12/2019    MCV 92.3 10/12/2019    RDW 13.7 10/12/2019     10/12/2019       CMP:   Lab Results   Component Value Date     10/12/2019    K 4.2 10/12/2019    K 4.2 10/10/2019     10/12/2019    CO2 28 10/12/2019    BUN 20 10/12/2019    CREATININE 0.7 10/12/2019    GFRAA >60 10/12/2019    AGRATIO 1.1 10/12/2019    LABGLOM >60 10/12/2019    GLUCOSE 158 10/12/2019    PROT 6.8 10/12/2019    CALCIUM 9.5 10/12/2019    BILITOT <0.2 10/12/2019    ALKPHOS 85 10/12/2019    AST 15 10/12/2019    ALT 6 10/12/2019       POC Tests: No results for input(s): POCGLU, POCNA, POCK, POCCL, POCBUN, POCHEMO, POCHCT in the last 72 hours. Coags:   Lab Results   Component Value Date    PROTIME 12.7 10/12/2019    INR 1.11 10/12/2019    APTT 35.4 09/26/2019       HCG (If Applicable): No results found for: PREGTESTUR, PREGSERUM, HCG, HCGQUANT     ABGs: No results found for: PHART, PO2ART, ILD0FTZ, TON1WDC, BEART, P6HDIHQM     Type & Screen (If Applicable):  No results found for: LABABO, [address]    Anesthesia Evaluation  Patient summary reviewed and Nursing notes reviewed no history of anesthetic complications:   Airway: Mallampati: II     Neck ROM: full   Dental:          Pulmonary:                              Cardiovascular:                      Neuro/Psych:               GI/Hepatic/Renal:            ROS comment: obesity. Endo/Other:                     Abdominal:           Vascular:                                        Anesthesia Plan      general and regional     ASA 2     (FIB for POA)  Induction: intravenous. Anesthetic plan and risks discussed with patient. Plan discussed with CRNA.                   Yuni Pitts MD   2/18/2020

## 2020-02-18 NOTE — OP NOTE
PATIENT NAMES Amanda Martinez M.D. SURGERY DATE   2/18/2020 1:37 PM    AGE 76 y.o. PATIENT TYPE  female    PRE-OPERATIVE DIAGNOSIS:  left hip osteoarthritis; obesity with BMI > 35  POST-OPERATIVE DIAGNOSIS: left hip osteoarthritis; obesity with BMI > 35    PROCEDURE PERFORMED: left total hip replacement with BINDU robotic system and intra-operative fluoroscopy (lateral approach; both femoral acetabular components were press fit; the artriculation was highly cross-linked polyethylene on CoCr)    IMPLANTS USED:Wells Trident II  52  mm  acetabular component (two cancellous bone screws); Trident X3 52 x 36 mm (E )  Liner; size 4  Accolade II  127 OFFSET Femoral stem; 36  mm CoCr femoral head (+ 5 mm neck length)     PRIMARY SURGEON: Nadya Wiley M.D. FIRST ASSISTANT:  Zuri Aranda PA-C     SECOND ASSISTANT: Babatunde Ortega MD     ANESTHESIA:  General with Fascia Iliacus Nerve block    ESTIMATED BLOOD LOSS: 200 mL . SPECIMENS: BONE    COMPLICATIONS:  None apparent. POST-OPERATIVE CONDITION: To Recovery room. INDICATIONS FOR SURGERY:  The patient is a 76y.o.-year-old female with a long history of hip pain refractory to conservative management affecting activities of daily living. The pain was refractory to conservative management including injections; PT, Ambulatory aids; oral medication (NSAIDs and pain medication)  for 3 - 6 months. Preop workup showed radiographic changes with osteophyte formation; loss of cartilage space; subchondral sclerosis and cysts. The patient was apprised of the risks, benefits and alternatives of surgery and all questions were answered and the patient wished to proceed with surgery. Preoperative templating was done using the [facility] software to ensure optimum placement of implants, customizing the implant position for patients anatomy and leg length.      Patients morbid obesity made the surgery at least 50% more difficult in exposure, position as well as our depth of insertion. We did place 1 cancellous bone screw in the safe zones with excellent purchase. The liner was then impacted into place and protected throughout the case. Next, an insert for the femoral side, the box osteotome was used on the lateral aspect of the great trochanter. Canal finders used to open up the femoral canal.  Successively, larger broaches were then used beginning with a size 1. The final broach provided excellent rotation with a good fit and filled the canal both proximally and distally. The femoral stem was inserted into place. Intra-operative fluoroscopy showed good overall alignment of our prosthesis and no evidence of kaila-prosthetic fracture. Next we trialed several different leg lengths. The appropriate length was selected providing the best overall combination of leg length equality and soft tissue tension and stability of the hip. We measured pre- and post-op leg length and offset with the [employer] robotic system using the femoral check point and the check point located at the inferior pole of the patella. Any needed adjustments were make at that time. The wounds were irrigated with copious amounts of irrigation solution. The final femoral head was then impacted in place. The hip was reduced atraumatically within the acetabulum. The wound was irrigated with copious amounts of irrigation solution using pulsatile lavage. The abductors and capsule was repaired  using No 5 and 2 Ethibond suture through bone. The fascia was closed using 0 Ethibond sutures, subcutaneous tissue closed using 2-0 Monocryl sutures and the skin was closed using 4-0 Monocryl suture. Dry sterile compression dressing was applied. The patient was taken to the recovery room in stable condition, having tolerated the procedure well.  2 views (AP and Lateral) of the Left hip were taken saved for permanent record.  They showed appropriately sized and positioned implants and no signs of periprosthetic fracture.      Ventura Miranda M.D.

## 2020-02-19 PROBLEM — Z96.642 STATUS POST TOTAL REPLACEMENT OF LEFT HIP: Status: ACTIVE | Noted: 2020-02-19

## 2020-02-19 LAB
ANION GAP SERPL CALCULATED.3IONS-SCNC: 9 MMOL/L (ref 3–16)
BASOPHILS ABSOLUTE: 0 K/UL (ref 0–0.2)
BASOPHILS RELATIVE PERCENT: 0.1 %
BUN BLDV-MCNC: 19 MG/DL (ref 7–20)
CALCIUM SERPL-MCNC: 8.6 MG/DL (ref 8.3–10.6)
CHLORIDE BLD-SCNC: 101 MMOL/L (ref 99–110)
CO2: 25 MMOL/L (ref 21–32)
CREAT SERPL-MCNC: 0.8 MG/DL (ref 0.6–1.2)
EOSINOPHILS ABSOLUTE: 0 K/UL (ref 0–0.6)
EOSINOPHILS RELATIVE PERCENT: 0 %
GFR AFRICAN AMERICAN: >60
GFR NON-AFRICAN AMERICAN: >60
GLUCOSE BLD-MCNC: 139 MG/DL (ref 70–99)
GLUCOSE BLD-MCNC: 145 MG/DL (ref 70–99)
GLUCOSE BLD-MCNC: 153 MG/DL (ref 70–99)
GLUCOSE BLD-MCNC: 171 MG/DL (ref 70–99)
GLUCOSE BLD-MCNC: 195 MG/DL (ref 70–99)
HCT VFR BLD CALC: 28.5 % (ref 36–48)
HEMOGLOBIN: 9.3 G/DL (ref 12–16)
LYMPHOCYTES ABSOLUTE: 1.2 K/UL (ref 1–5.1)
LYMPHOCYTES RELATIVE PERCENT: 9.3 %
MCH RBC QN AUTO: 29.1 PG (ref 26–34)
MCHC RBC AUTO-ENTMCNC: 32.7 G/DL (ref 31–36)
MCV RBC AUTO: 88.8 FL (ref 80–100)
MONOCYTES ABSOLUTE: 1.1 K/UL (ref 0–1.3)
MONOCYTES RELATIVE PERCENT: 8.5 %
NEUTROPHILS ABSOLUTE: 10.4 K/UL (ref 1.7–7.7)
NEUTROPHILS RELATIVE PERCENT: 82.1 %
PDW BLD-RTO: 17.8 % (ref 12.4–15.4)
PERFORMED ON: ABNORMAL
PLATELET # BLD: 168 K/UL (ref 135–450)
PMV BLD AUTO: 8.5 FL (ref 5–10.5)
POTASSIUM REFLEX MAGNESIUM: 4.7 MMOL/L (ref 3.5–5.1)
RBC # BLD: 3.21 M/UL (ref 4–5.2)
SODIUM BLD-SCNC: 135 MMOL/L (ref 136–145)
WBC # BLD: 12.6 K/UL (ref 4–11)

## 2020-02-19 PROCEDURE — 97530 THERAPEUTIC ACTIVITIES: CPT

## 2020-02-19 PROCEDURE — 97110 THERAPEUTIC EXERCISES: CPT

## 2020-02-19 PROCEDURE — 2580000003 HC RX 258: Performed by: PHYSICIAN ASSISTANT

## 2020-02-19 PROCEDURE — 85025 COMPLETE CBC W/AUTO DIFF WBC: CPT

## 2020-02-19 PROCEDURE — 6370000000 HC RX 637 (ALT 250 FOR IP): Performed by: ANESTHESIOLOGY

## 2020-02-19 PROCEDURE — 80048 BASIC METABOLIC PNL TOTAL CA: CPT

## 2020-02-19 PROCEDURE — 97116 GAIT TRAINING THERAPY: CPT

## 2020-02-19 PROCEDURE — 6360000002 HC RX W HCPCS: Performed by: PHYSICIAN ASSISTANT

## 2020-02-19 PROCEDURE — APPNB30 APP NON BILLABLE TIME 0-30 MINS: Performed by: PHYSICIAN ASSISTANT

## 2020-02-19 PROCEDURE — 97162 PT EVAL MOD COMPLEX 30 MIN: CPT

## 2020-02-19 PROCEDURE — 6370000000 HC RX 637 (ALT 250 FOR IP): Performed by: PHYSICIAN ASSISTANT

## 2020-02-19 PROCEDURE — 36415 COLL VENOUS BLD VENIPUNCTURE: CPT

## 2020-02-19 PROCEDURE — 1200000000 HC SEMI PRIVATE

## 2020-02-19 PROCEDURE — 97166 OT EVAL MOD COMPLEX 45 MIN: CPT

## 2020-02-19 RX ADMIN — ACETAMINOPHEN 650 MG: 325 TABLET ORAL at 08:56

## 2020-02-19 RX ADMIN — APIXABAN 2.5 MG: 2.5 TABLET, FILM COATED ORAL at 21:31

## 2020-02-19 RX ADMIN — ACETAMINOPHEN 650 MG: 325 TABLET ORAL at 21:31

## 2020-02-19 RX ADMIN — OXYCODONE 10 MG: 5 TABLET ORAL at 04:39

## 2020-02-19 RX ADMIN — KETOROLAC TROMETHAMINE 15 MG: 30 INJECTION, SOLUTION INTRAMUSCULAR at 19:38

## 2020-02-19 RX ADMIN — OXYCODONE 10 MG: 5 TABLET ORAL at 17:14

## 2020-02-19 RX ADMIN — GABAPENTIN 300 MG: 300 CAPSULE ORAL at 21:31

## 2020-02-19 RX ADMIN — OXYCODONE 10 MG: 5 TABLET ORAL at 09:14

## 2020-02-19 RX ADMIN — OXYCODONE 10 MG: 5 TABLET ORAL at 21:30

## 2020-02-19 RX ADMIN — GABAPENTIN 300 MG: 300 CAPSULE ORAL at 14:48

## 2020-02-19 RX ADMIN — DOCUSATE SODIUM 100 MG: 100 CAPSULE, LIQUID FILLED ORAL at 21:30

## 2020-02-19 RX ADMIN — Medication 10 ML: at 19:39

## 2020-02-19 RX ADMIN — SENNOSIDES AND DOCUSATE SODIUM 1 TABLET: 8.6; 5 TABLET ORAL at 08:56

## 2020-02-19 RX ADMIN — SENNOSIDES AND DOCUSATE SODIUM 1 TABLET: 8.6; 5 TABLET ORAL at 21:31

## 2020-02-19 RX ADMIN — CEFAZOLIN 2 G: 10 INJECTION, POWDER, FOR SOLUTION INTRAVENOUS at 06:23

## 2020-02-19 RX ADMIN — CELECOXIB 100 MG: 100 CAPSULE ORAL at 08:56

## 2020-02-19 RX ADMIN — INSULIN LISPRO 1 UNITS: 100 INJECTION, SOLUTION INTRAVENOUS; SUBCUTANEOUS at 08:58

## 2020-02-19 RX ADMIN — DOCUSATE SODIUM 100 MG: 100 CAPSULE, LIQUID FILLED ORAL at 08:57

## 2020-02-19 RX ADMIN — Medication 10 ML: at 08:57

## 2020-02-19 RX ADMIN — PANTOPRAZOLE SODIUM 40 MG: 40 TABLET, DELAYED RELEASE ORAL at 08:56

## 2020-02-19 RX ADMIN — INSULIN LISPRO 1 UNITS: 100 INJECTION, SOLUTION INTRAVENOUS; SUBCUTANEOUS at 21:40

## 2020-02-19 RX ADMIN — FERROUS SULFATE TAB 325 MG (65 MG ELEMENTAL FE) 325 MG: 325 (65 FE) TAB at 08:56

## 2020-02-19 RX ADMIN — SODIUM CHLORIDE, POTASSIUM CHLORIDE, SODIUM LACTATE AND CALCIUM CHLORIDE: 600; 310; 30; 20 INJECTION, SOLUTION INTRAVENOUS at 06:24

## 2020-02-19 RX ADMIN — APIXABAN 2.5 MG: 2.5 TABLET, FILM COATED ORAL at 08:56

## 2020-02-19 RX ADMIN — GABAPENTIN 300 MG: 300 CAPSULE ORAL at 08:56

## 2020-02-19 RX ADMIN — ACETAMINOPHEN 650 MG: 325 TABLET ORAL at 14:47

## 2020-02-19 RX ADMIN — CELECOXIB 100 MG: 100 CAPSULE ORAL at 21:30

## 2020-02-19 ASSESSMENT — PAIN DESCRIPTION - PAIN TYPE
TYPE: SURGICAL PAIN

## 2020-02-19 ASSESSMENT — PAIN DESCRIPTION - ORIENTATION
ORIENTATION: LEFT

## 2020-02-19 ASSESSMENT — PAIN SCALES - GENERAL
PAINLEVEL_OUTOF10: 7
PAINLEVEL_OUTOF10: 6
PAINLEVEL_OUTOF10: 0
PAINLEVEL_OUTOF10: 7
PAINLEVEL_OUTOF10: 8
PAINLEVEL_OUTOF10: 7

## 2020-02-19 ASSESSMENT — PAIN DESCRIPTION - LOCATION
LOCATION: HIP

## 2020-02-19 ASSESSMENT — PAIN DESCRIPTION - FREQUENCY: FREQUENCY: CONTINUOUS

## 2020-02-19 ASSESSMENT — PAIN DESCRIPTION - DESCRIPTORS
DESCRIPTORS: ACHING

## 2020-02-19 ASSESSMENT — PAIN DESCRIPTION - ONSET: ONSET: ON-GOING

## 2020-02-19 NOTE — PROGRESS NOTES
Treatment: Patient reporting fatigue but able to participate. Family / Caregiver Present: No  Referring Practitioner: Franko Tripp MD  Subjective  Subjective: Pt resting in bed, states, \"I just went to the bathroom\"  General Comment  Comments: Pt agreeable to PT  Pain Screening  Patient Currently in Pain: Yes  Pain Assessment  Pain Assessment: 0-10  Patient's Stated Pain Goal: 5  Pain Type: Surgical pain  Pain Location: Hip  Pain Orientation: Left  Non-Pharmaceutical Pain Intervention(s): Therapeutic presence; Ambulation/Increased Activity;Repositioned;Cold applied  Vital Signs  Patient Currently in Pain: Yes       Orientation  Orientation  Overall Orientation Status: Within Normal Limits       Objective   Bed mobility  Supine to Sit: Moderate assistance  Sit to Supine: Unable to assess(pt up in chair at EOS)  Scooting: Stand by assistance(to EOB)  Transfers  Sit to Stand:  Moderate Assistance  Stand to sit: Contact guard assistance  Bed to Chair: Moderate assistance  Ambulation  Ambulation?: Yes  WB Status: FWBAT  Ambulation 1  Surface: level tile  Device: Rolling Walker  Assistance: Minimal assistance;Contact guard assistance  Quality of Gait: step to pattern, joy small steps, no giving way of knee this afternoon session, VC's needed to prevent hip ER during gait, educated regarding turning in counterclockwise fashion to avoid excessive ER, pt with difficulty avoiding ER this afternoon  Gait Deviations: Slow Lorna;Decreased step length;Decreased step height  Distance: 10 ft  Stairs/Curb  Stairs?: No     Balance  Posture: Good  Sitting - Static: Good  Sitting - Dynamic: Good  Standing - Static: Fair  Standing - Dynamic: Fair;-  Exercises  Quad Sets: x 10 B   Heelslides: x 10 BLE with assist LLE  Gluteal Sets: x 10 B  Knee Short Arc Quad: x 10 BLE indep  Ankle Pumps: x 20 B   AROM RLE (degrees)  RLE AROM: WFL  AROM LLE (degrees)  LLE General AROM: decreased due to recent surgery, self limiting  Strength RLE  Comment: not formally assessed, observed to be at least 3+/5 throughout  Strength LLE  Comment: not formally assessed, observed to be 2+/5 at hip and knee ,required assist for SAQ, at least 3+/5 ankle                   AM-PAC Score  AM-PAC Inpatient Mobility Raw Score : 13 (02/19/20 1654)  AM-PAC Inpatient T-Scale Score : 36.74 (02/19/20 1654)  Mobility Inpatient CMS 0-100% Score: 64.91 (02/19/20 1654)  Mobility Inpatient CMS G-Code Modifier : CL (02/19/20 1654)          Goals  Short term goals  Time Frame for Short term goals: 2/23/20 unless noted  Short term goal 1: Pt will perform bed mobility with mod A x1by 2/22/20; goal met 2/19/20  Short term goal 2: Pt will perform transfers with mod A x1; goal met 2/19/20  Short term goal 3: Pt will ambulate 20 ft with walker and CGA  Short term goal 4: Pt will tolerate 12-15 reps of LE exercise for strengthening and balance  Patient Goals   Patient goals : \"to be able to walk\"    Plan    Plan  Times per week: BID x7  Times per day: Twice a day  Current Treatment Recommendations: Strengthening, Gait Training, ROM, Balance Training, Functional Mobility Training, Endurance Training, Transfer Training  Safety Devices  Type of devices: All fall risk precautions in place, Left in chair, Call light within reach, Chair alarm in place, Nurse notified, Gait belt, Patient at risk for falls     Therapy Time   Individual Concurrent Group Co-treatment   Time In 1535         Time Out 1602         Minutes 27         Timed Code Treatment Minutes: 40 Minutes    If pt is discharged prior to next therapy session, this note will serve as discharge summary.     Rosa Mathew, PT

## 2020-02-19 NOTE — PROGRESS NOTES
Occupational Therapy   Occupational Therapy Initial Assessment/ Treatment  Date: 2020   Patient Name: Ni Ritchie  MRN: [id number]     : 1945    Date of Service: 2020    Discharge Recommendations:  Subacute/Skilled Nursing Facility     Assessment   Performance deficits / Impairments: Decreased functional mobility ; Decreased safe awareness;Decreased balance;Decreased ADL status; Decreased strength;Decreased high-level IADLs  Assessment: Pt seen POD #1 for left hip OA s/p left total hip replacement. Anterolateral approach utilized. Pt IPTA. Pt [address] [place] x2 for bed mobility, functional mobility, and transfers. Pt would benefit from continued skilled OT services. Prognosis: Good  Decision Making: Medium Complexity  OT Education: OT Role;Transfer Training;Plan of Care;ADL Adaptive Strategies  REQUIRES OT FOLLOW UP: Yes  Activity Tolerance  Activity Tolerance: Patient Tolerated treatment well  Activity Tolerance: Vitals: EOB prior to starting therapy Bp was 121/69. Pt go up into chair and reported dizziness. Bp was 88/60. Feet were elevated and cold pack was applied to neck. Pt Bp rechecked after 3-5 minutes of rest and was back up to 146/69. RN was notified and approved of her staying in chair. Safety Devices  Safety Devices in place: Yes  Type of devices: Call light within reach; Chair alarm in place; Left in chair;Gait belt;Nurse notified         Patient Diagnosis(es): The encounter diagnosis was Primary osteoarthritis of left hip.     has a past medical history of Deep vein thrombosis (DVT) ([facility] [address] Degenerative disc disease, lumbar, Hx of blood clots, Localized osteoarthrosis of left hip, and Neuropathy. has a past surgical history that includes Hysterectomy; Total hip arthroplasty (Right, 10/9/2019); joint replacement; hip surgery; and Total hip arthroplasty (Left, 2020).            Restrictions  Restrictions/Precautions  Restrictions/Precautions: General Precautions, ROM Restrictions, Fall Risk  Required Braces or Orthoses?: No  Position Activity Restriction  Hip Precautions: No ADduction, No hip external rotation, No active ABduction, No hip extension  Other position/activity restrictions: AL hip precautions    Subjective   General  Chart Reviewed: Yes  Patient assessed for rehabilitation services?: Yes  Family / Caregiver Present: No  Referring Practitioner: Yaz Hdez MD  Diagnosis: Primary OA left hip s/p Left total knee replacement (Anterolateral approach)  Subjective  Subjective: Pt supine in bed upon therapy arrival. Pt agreeable to therapy evaluation but appeared anxious. Pt verbalized concern about going home without someone being there to assist 24/7. Patient Currently in Pain: Yes  Pain Assessment  Pain Assessment: 0-10  Pain Level: 7  Pain Type: Surgical pain  Pain Location: Hip  Pain Orientation: Left  Pain Descriptors: Aching  Non-Pharmaceutical Pain Intervention(s): Therapeutic presence; Emotional support; Ambulation/Increased Activity  Pre Treatment Pain Screening  Intervention List: Patient able to continue with treatment  Vital Signs  Patient Currently in Pain: Yes    Social/Functional History  Social/Functional History  Lives With: Family(sister; sister works)  Type of Home: [facility] [employer] Layout: Two level, Bed/Bath upstairs, 1/2 bath on main level(staying downstairs for time being; has chair lift)  Home Access: Stairs to enter with rails  Entrance Stairs - Number of Steps: 4 JODI  Entrance Stairs - Rails: Both  Bathroom Shower/Tub: Walk-in shower, Shower chair with back  Bathroom Toilet: [address]: 3-in-1 commode, Grab bars in [address]: 4 wheeled walker, Grab bars, Reacher, Sock aid, Long-handled shoehorn, Rolling walker  Receives Help From: Family  ADL Assistance: Needs assistance(home health aid)  Bath: Supervision  Dressing: Supervision  Toileting: Independent  Homemaking Responsibilities: No  Ambulation Assistance: Independent(w/ 4 wheel walker)  Transfer Assistance: Independent(w/ 4 wheel walker)  Active : Yes  Occupation: Retired  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 )  UE Dressing: Maximum assistance(donning socks)     Bed mobility  Supine to Sit: Moderate assistance;2 Person assistance(w/ HOB elevated)  Sit to Supine: Moderate assistance;2 Person assistance  Scooting: Moderate assistance;2 Person assistance     Transfers  Stand Step Transfers: Moderate assistance;2 Person assistance(w/ RW; prompting for RW safety)  Sit to stand: Moderate assistance;2 Person assistance(at RW)  Stand to sit: Moderate assistance;2 Person assistance(prompting for hand placement)     LUE Strength  Gross LUE Strength: WFL  L Hand General: 4/5  RUE Strength  Gross RUE Strength: WFL  R Hand General: 4/5     Plan   Plan  Times per week: 4-6x  Current Treatment Recommendations: Strengthening, Patient/Caregiver Education & Training, Self-Care / ADL, Functional Mobility Training, Safety Education & Training, Balance Training, Equipment Evaluation, Education, & procurement    AM-PAC Score  [facility] Inpatient Daily Activity Raw Score: 18 (02/19/20 1418)  -PAC Inpatient ADL T-Scale Score : 38.66 (02/19/20 1418)  ADL Inpatient CMS 0-100% Score: 46.65 (02/19/20 1418)  ADL Inpatient CMS G-Code Modifier : CK (02/19/20 1418)    Goals  Short term goals  Time Frame for Short term goals: 1 week unless otherwise specified by 2/26/2020. Short term goal 1: Pt will complete LE dressing w/ SBA. Short term goal 2: Pt will complete toilet transfer w/ Jo.   Short term

## 2020-02-19 NOTE — CARE COORDINATION
CASE MANAGEMENT INITIAL ASSESSMENT      Reviewed chart and met with patient today, re: s/p THR  Explained Case Management role/services. Family present: None  Primary contact information: Jose Rosas [phone]    Admit date/status: 2/18/20 IP  Diagnosis: Total Hip Replacement    Insurance: [facility] Medicare  Precert required for SNF - Y       3 night stay required - N    Living arrangements, Adls, care needs, prior to admission: Lives in a two story house with sister. Independent in ADL's and still drives. Transportation: [facility]    [address] at home: Walker_X_CaneX__RTS__ BSC__Shower Chair_X_  02__ HHN__ CPAP__  BiPap__  Hospital Bed__ W/C___ Other____Stair Lift______    Services in the home and/or outpatient, prior to admission: None  :    PT/OT recs: [address] Notification (HEN): Needed for SNF, not initiated. Barriers to discharge: None    Plan/comments: CM met with pt at bedside for initial assessment. Pt states that her sister is not home during the day and she has no one at home to help her. Pt would [facility]. Referral faxed and call placed to confirm receipt.  CM following-Amanda Bridges RN      ECOC on chart for MD signature

## 2020-02-19 NOTE — PROGRESS NOTES
Pt states can take roxycodone  But not norco .states  Takes ultram at home encouraged to turn with assist, pillows between her legs encouraged coughing deep breathing and ankle pumps q1h w/a.pt refused to get out of bed with assist or walk with walker with assist to br .  Pt refused the stedy

## 2020-02-19 NOTE — PLAN OF CARE
Problem: Pain:  Description  Pain management should include both nonpharmacologic and pharmacologic interventions. Goal: Pain level will decrease  Description  Pain level will decrease  Outcome: Ongoing     Problem: Pain:  Description  Pain management should include both nonpharmacologic and pharmacologic interventions.   Goal: Control of acute pain  Description  Control of acute pain  Outcome: Ongoing

## 2020-02-19 NOTE — ANESTHESIA POSTPROCEDURE EVALUATION
Department of Anesthesiology  Postprocedure Note    Patient: Edgard Weeks  MRN: [id number]  YOB: 1945  Date of evaluation: 2/18/2020  Time:  8:01 PM     Procedure Summary     Date:  02/18/20 Room / Location:  [address] [place] [address]    Anesthesia Start:  3456 Anesthesia Stop:  6008    Procedure:  LEFT LATERAL TOTAL  [facility]  CPT CODE - 01424, 46165 (Left Hip) Diagnosis:       Primary osteoarthritis of left hip      (LEFT HIP OSTEOARTHRITIS)    Surgeon:  Wanda Beavers MD Responsible Provider:  Hardeep Du MD    Anesthesia Type:  general, regional ASA Status:  2          Anesthesia Type: general, regional    Coral Phase I: Coral Score: 9    Coral Phase II:      Last vitals: Reviewed and per EMR flowsheets.        Anesthesia Post Evaluation    Comments: Postoperative Anesthesia Note    Name:    Edgard Weeks  MRN:      [id number]    Patient Vitals in the past 12 hrs:  02/18/20 1917, BP:(!) 94/55, Temp:98.4 °F (36.9 °C), Temp src:Oral, Pulse:83, Resp:18, SpO2:100 %  02/18/20 1820, BP:(!) 153/86, Temp:97.5 °F (36.4 °C), Temp src:Oral, Pulse:75, Resp:18, SpO2:97 %  02/18/20 1724, BP:133/68, Pulse:66  02/18/20 1715, BP:(!) 119/104, Pulse:90  02/18/20 1700, Pulse:84  02/18/20 1645, Pulse:84  02/18/20 1630, BP:(!) 181/92, Pulse:87  02/18/20 1615, BP:(!) 187/89, Pulse:86  02/18/20 1610, Pulse:89  02/18/20 1605, Pulse:84  02/18/20 1600, BP:(!) 171/93, Temp:97 °F (36.1 °C), Pulse:83, SpO2:100 %  02/18/20 1555, Pulse:83  02/18/20 1550, Pulse:85  02/18/20 1545, BP:(!) 160/90, Pulse:88  02/18/20 1540, Pulse:91  02/18/20 1535, Pulse:91, Resp:18  02/18/20 1532, SpO2:100 %  02/18/20 1530, BP:(!) 157/92, Pulse:92, Resp:17  02/18/20 1515, BP:(!) 165/76, Pulse:91, Resp:18, SpO2:99 %  02/18/20 1500, BP:(!) 145/118, Pulse:90, Resp:15, SpO2:99 %  02/18/20 1445, BP:(!) 171/93, Pulse:82, Resp:11, SpO2:91 %  02/18/20 1430, BP:(!) 123/101, Pulse:81, Resp:9, SpO2:99 %  02/18/20 1422, Temp:96.6 °F (35.9 °C), Temp src:Temporal  02/18/20 1040, BP:136/80, Pulse:72, Resp:16, SpO2:100 %  02/18/20 0939, BP:(!) 137/90, Temp:98.6 °F (37 °C), Temp src:Temporal, Pulse:91, Resp:16, SpO2:100 %, Height:5' 5\" (1.651 m), Weight:213 lb (96.6 kg)     LABS:    CBC  Lab Results       Component                Value               Date/Time                  WBC                      11.4 (H)            10/12/2019 02:00 AM        HGB                      10.3 (L)            10/12/2019 02:00 AM        HCT                      30.1 (L)            10/12/2019 02:00 AM        PLT                      211                 10/12/2019 02:00 AM   RENAL  Lab Results       Component                Value               Date/Time                  NA                       139                 10/12/2019 02:00 AM        K                        4.2                 10/12/2019 02:00 AM        K                        4.2                 10/10/2019 06:18 AM        CL                       100                 10/12/2019 02:00 AM        CO2                      28                  10/12/2019 02:00 AM        BUN                      20                  10/12/2019 02:00 AM        CREATININE               0.7                 10/12/2019 02:00 AM        GLUCOSE                  158 (H)             10/12/2019 02:00 AM   COAGS  Lab Results       Component                Value               Date/Time                  PROTIME                  12.7                10/12/2019 02:00 AM        INR                      1.11                10/12/2019 02:00 AM        APTT                     35.4                09/26/2019 12:23 PM     Intake & Output:  In: 2540 (P.O.:540; I.V.:2000)  Out: 200     Nausea & Vomiting:  No    Level of Consciousness:  Awake    Pain Assessment:  Adequate analgesia    Anesthesia Complications:  No apparent anesthetic complications    SUMMARY      Vital signs stable  OK to

## 2020-02-19 NOTE — PROGRESS NOTES
Department of Orthopedic Surgery  Physician Assistant   Progress Note    Subjective:     Post-Operative Day: 1 Status Post left Total Hip Arthroplasty  Systemic or Specific Complaints:Pain Control and fatigue this am.  Able to work with therapy and sitting in the chair. Hypotension noted when up with therapy. Objective:     Patient Vitals for the past 24 hrs:   BP Temp Temp src Pulse Resp SpO2   02/19/20 0855 117/78 97.9 °F (36.6 °C) Oral 96 16 98 %   02/19/20 0427 122/71 97.9 °F (36.6 °C) Oral 93 16 96 %   02/18/20 2340 139/75 98.3 °F (36.8 °C) Oral 94 20 100 %   02/18/20 2055 133/77 97.3 °F (36.3 °C) Oral -- 20 100 %   02/18/20 1917 (!) 94/55 98.4 °F (36.9 °C) Oral 83 18 100 %   02/18/20 1820 (!) 153/86 97.5 °F (36.4 °C) Oral 75 18 97 %   02/18/20 1724 133/68 -- -- 66 -- --   02/18/20 1715 (!) 119/104 -- -- 90 -- --   02/18/20 1700 -- -- -- 84 -- --   02/18/20 1645 -- -- -- 84 -- --   02/18/20 1630 (!) 181/92 -- -- 87 -- --   02/18/20 1615 (!) 187/89 -- -- 86 -- --   02/18/20 1610 -- -- -- 89 -- --   02/18/20 1605 -- -- -- 84 -- --   02/18/20 1600 (!) 171/93 97 °F (36.1 °C) -- 83 -- 100 %   02/18/20 1555 -- -- -- 83 -- --   02/18/20 1550 -- -- -- 85 -- --   02/18/20 1545 (!) 160/90 -- -- 88 -- --   02/18/20 1540 -- -- -- 91 -- --   02/18/20 1535 -- -- -- 91 18 --   02/18/20 1532 -- -- -- -- -- 100 %   02/18/20 1530 (!) 157/92 -- -- 92 17 --   02/18/20 1515 (!) 165/76 -- -- 91 18 99 %   02/18/20 1500 (!) 145/118 -- -- 90 15 99 %   02/18/20 1445 (!) 171/93 -- -- 82 11 91 %   02/18/20 1430 (!) 123/101 -- -- 81 9 99 %   02/18/20 1422 -- 96.6 °F (35.9 °C) Temporal -- -- --       General: alert, appears stated age, cooperative and no distress   Wound: Wound clean and dry no evidence of infection. Motion: Painful range of Motion   DVT Exam: No evidence of DVT seen on physical exam.       NVI in lower extremity. Thigh swollen but soft. Moving foot and ankle.       Data Review  CBC:   Lab Results   Component Value Date    WBC 12.6 02/19/2020    RBC 3.21 02/19/2020    HGB 9.3 02/19/2020    HCT 28.5 02/19/2020     02/19/2020       Assessment:     Status Post left Total Hip Arthroplasty. Doing well postoperatively. Plan:      1: Continues current post-op course, IM signed off. Labs stable post op. Hypotension noted with therapy, resolved. Encourage PO intake. Pt recovered well after right THR in 10/19. She was discharged to the [facility]. 2:  Continue Deep venous thrombosis prophylaxis- Eliquis, pt with hx of DVT. Will continue through post operative course. 3:  Continue physical therapy and OT, WBAT  4:  Continue Pain Control PRN. Monitor MS. Pt on Ultram PTA. 5:  D/c planning for most likely return to the [facility]. Pt does not have assistance at home, sister works full time and cannot take PTO. She has no other family to assist.  She discharged to the [facility] after right THR in October and was there for about 5 weeks before she was functional enough to d/c home. DCP updated.      Armida Payton PA-C

## 2020-02-19 NOTE — PROGRESS NOTES
Physical Therapy    Facility/Department: [place] [id number] - MED SURG/ORTHO  Initial Assessment and treatment    NAME: Carmenza Thakur  : 1945  MRN: [id number]    Date of Service: 2020    Discharge Recommendations:  Subacute/Skilled Nursing Facility   PT Equipment Recommendations  Equipment Needed: No    Assessment   Body structures, Functions, Activity limitations: Decreased functional mobility ; Decreased endurance;Decreased ROM; Decreased strength;Decreased balance; Increased pain  Assessment: Pt referred for PT evaluation during current hospital stay POD #1 left LAUREEN. Pt currently functioning below baseline, requiring mod A x 2 for bed mobility, transfers, gait x 4 ft with RW, with additional time needed for all mobility. Pt is weepy during session and appears to be self limiting this AM session, perseverating on going to facility at ID from acute setting as she has no help at home during most of the day. PT would benefit from skilled acute PT to address deficits. Recommend SNF at ID from acute setting  Treatment Diagnosis: decreased mobility  Prognosis: Good  Decision Making: Medium Complexity  PT Education: Goals;Transfer Training;Equipment;PT Role;Functional Mobility Training;Plan of Care;Weight-bearing Education;Home Exercise Program;Precautions;Gait Training  Patient Education: pt verbalized understanding  Barriers to Learning: no  REQUIRES PT FOLLOW UP: Yes  Activity Tolerance  Activity Tolerance: Patient limited by fatigue;Patient limited by pain  Activity Tolerance: self limiting       Patient Diagnosis(es): The encounter diagnosis was Primary osteoarthritis of left hip.     has a past medical history of Deep vein thrombosis (DVT) (HCC), Degenerative disc disease, lumbar, Hx of blood clots, Localized osteoarthrosis of left hip, and Neuropathy. has a past surgical history that includes Hysterectomy;  Total hip arthroplasty (Right, 10/9/2019); joint replacement; hip surgery; and Total hip arthroplasty (Left, 2/18/2020).     Restrictions  Restrictions/Precautions  Restrictions/Precautions: General Precautions, ROM Restrictions, Fall Risk  Required Braces or Orthoses?: No  Position Activity Restriction  Hip Precautions: No ADduction, No hip external rotation, No active ABduction, No hip extension  Other position/activity restrictions: AL hip precautions  Vision/Hearing  Vision: Within Functional Limits  Hearing: Within functional limits     Subjective  General  Patient assessed for rehabilitation services?: Yes  Pain Screening  Patient Currently in Pain: Yes          Orientation  Orientation  Overall Orientation Status: Within Normal Limits  Social/Functional History  Social/Functional History  Lives With: Family(sister; sister works)  Type of Home: [employer] Layout: Two level, Bed/Bath upstairs, 1/2 bath on main level(staying downstairs for time being; has chair lift)  Home Access: Stairs to enter with rails  Entrance Stairs - Number of Steps: 4 JODI  Entrance Stairs - Rails: Both  Bathroom Shower/Tub: Walk-in shower, Shower chair with back  Bathroom Toilet: Standard  Bathroom Equipment: 3-in-1 commode, Grab bars in shower  Home Equipment: 4 wheeled walker, Grab bars, Reacher, Sock aid, Long-handled shoehorn, Rolling walker  Receives Help From: Family  ADL Assistance: Needs assistance(home health aid)  Bath: Supervision  Dressing: Supervision  Toileting: Independent  Homemaking Responsibilities: No  Ambulation Assistance: Independent(w/ 4 wheel walker)  Transfer Assistance: Independent(w/ 4 wheel walker)  Active : Yes  Occupation: Retired  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

## 2020-02-20 LAB
BASOPHILS ABSOLUTE: 0 K/UL (ref 0–0.2)
BASOPHILS RELATIVE PERCENT: 0.4 %
EOSINOPHILS ABSOLUTE: 0.1 K/UL (ref 0–0.6)
EOSINOPHILS RELATIVE PERCENT: 1.1 %
GLUCOSE BLD-MCNC: 126 MG/DL (ref 70–99)
GLUCOSE BLD-MCNC: 130 MG/DL (ref 70–99)
GLUCOSE BLD-MCNC: 149 MG/DL (ref 70–99)
HCT VFR BLD CALC: 24.2 % (ref 36–48)
HEMOGLOBIN: 8 G/DL (ref 12–16)
LYMPHOCYTES ABSOLUTE: 2 K/UL (ref 1–5.1)
LYMPHOCYTES RELATIVE PERCENT: 19 %
MCH RBC QN AUTO: 29.6 PG (ref 26–34)
MCHC RBC AUTO-ENTMCNC: 33.1 G/DL (ref 31–36)
MCV RBC AUTO: 89.4 FL (ref 80–100)
MONOCYTES ABSOLUTE: 0.9 K/UL (ref 0–1.3)
MONOCYTES RELATIVE PERCENT: 8.6 %
NEUTROPHILS ABSOLUTE: 7.3 K/UL (ref 1.7–7.7)
NEUTROPHILS RELATIVE PERCENT: 70.9 %
PDW BLD-RTO: 17.7 % (ref 12.4–15.4)
PERFORMED ON: ABNORMAL
PLATELET # BLD: 148 K/UL (ref 135–450)
PMV BLD AUTO: 8.2 FL (ref 5–10.5)
RBC # BLD: 2.71 M/UL (ref 4–5.2)
WBC # BLD: 10.3 K/UL (ref 4–11)

## 2020-02-20 PROCEDURE — 97535 SELF CARE MNGMENT TRAINING: CPT

## 2020-02-20 PROCEDURE — 6370000000 HC RX 637 (ALT 250 FOR IP): Performed by: PHYSICIAN ASSISTANT

## 2020-02-20 PROCEDURE — 97110 THERAPEUTIC EXERCISES: CPT

## 2020-02-20 PROCEDURE — 97530 THERAPEUTIC ACTIVITIES: CPT

## 2020-02-20 PROCEDURE — 85025 COMPLETE CBC W/AUTO DIFF WBC: CPT

## 2020-02-20 PROCEDURE — 2580000003 HC RX 258: Performed by: PHYSICIAN ASSISTANT

## 2020-02-20 PROCEDURE — 1200000000 HC SEMI PRIVATE

## 2020-02-20 PROCEDURE — 97116 GAIT TRAINING THERAPY: CPT

## 2020-02-20 PROCEDURE — 36415 COLL VENOUS BLD VENIPUNCTURE: CPT

## 2020-02-20 PROCEDURE — 6370000000 HC RX 637 (ALT 250 FOR IP): Performed by: ANESTHESIOLOGY

## 2020-02-20 PROCEDURE — APPNB30 APP NON BILLABLE TIME 0-30 MINS: Performed by: PHYSICIAN ASSISTANT

## 2020-02-20 PROCEDURE — 6360000002 HC RX W HCPCS: Performed by: PHYSICIAN ASSISTANT

## 2020-02-20 RX ADMIN — APIXABAN 2.5 MG: 2.5 TABLET, FILM COATED ORAL at 08:25

## 2020-02-20 RX ADMIN — OXYCODONE 10 MG: 5 TABLET ORAL at 03:53

## 2020-02-20 RX ADMIN — PANTOPRAZOLE SODIUM 40 MG: 40 TABLET, DELAYED RELEASE ORAL at 08:25

## 2020-02-20 RX ADMIN — ACETAMINOPHEN 650 MG: 325 TABLET ORAL at 08:25

## 2020-02-20 RX ADMIN — DOCUSATE SODIUM 100 MG: 100 CAPSULE, LIQUID FILLED ORAL at 20:51

## 2020-02-20 RX ADMIN — KETOROLAC TROMETHAMINE 15 MG: 30 INJECTION, SOLUTION INTRAMUSCULAR at 01:36

## 2020-02-20 RX ADMIN — ACETAMINOPHEN 650 MG: 325 TABLET ORAL at 03:53

## 2020-02-20 RX ADMIN — Medication 10 ML: at 01:36

## 2020-02-20 RX ADMIN — GABAPENTIN 300 MG: 300 CAPSULE ORAL at 14:46

## 2020-02-20 RX ADMIN — CELECOXIB 100 MG: 100 CAPSULE ORAL at 08:25

## 2020-02-20 RX ADMIN — Medication 10 ML: at 08:26

## 2020-02-20 RX ADMIN — ACETAMINOPHEN 650 MG: 325 TABLET ORAL at 14:46

## 2020-02-20 RX ADMIN — KETOROLAC TROMETHAMINE 15 MG: 30 INJECTION, SOLUTION INTRAMUSCULAR at 16:23

## 2020-02-20 RX ADMIN — FERROUS SULFATE TAB 325 MG (65 MG ELEMENTAL FE) 325 MG: 325 (65 FE) TAB at 08:25

## 2020-02-20 RX ADMIN — DOCUSATE SODIUM 100 MG: 100 CAPSULE, LIQUID FILLED ORAL at 08:25

## 2020-02-20 RX ADMIN — GABAPENTIN 300 MG: 300 CAPSULE ORAL at 20:52

## 2020-02-20 RX ADMIN — Medication 10 ML: at 20:54

## 2020-02-20 RX ADMIN — APIXABAN 2.5 MG: 2.5 TABLET, FILM COATED ORAL at 20:52

## 2020-02-20 RX ADMIN — GABAPENTIN 300 MG: 300 CAPSULE ORAL at 08:25

## 2020-02-20 RX ADMIN — SENNOSIDES AND DOCUSATE SODIUM 1 TABLET: 8.6; 5 TABLET ORAL at 20:51

## 2020-02-20 RX ADMIN — CELECOXIB 100 MG: 100 CAPSULE ORAL at 20:52

## 2020-02-20 RX ADMIN — ACETAMINOPHEN 650 MG: 325 TABLET ORAL at 20:51

## 2020-02-20 RX ADMIN — SENNOSIDES AND DOCUSATE SODIUM 1 TABLET: 8.6; 5 TABLET ORAL at 08:25

## 2020-02-20 ASSESSMENT — PAIN DESCRIPTION - PROGRESSION: CLINICAL_PROGRESSION: GRADUALLY IMPROVING

## 2020-02-20 ASSESSMENT — PAIN SCALES - GENERAL
PAINLEVEL_OUTOF10: 4
PAINLEVEL_OUTOF10: 0
PAINLEVEL_OUTOF10: 9
PAINLEVEL_OUTOF10: 8
PAINLEVEL_OUTOF10: 3
PAINLEVEL_OUTOF10: 7

## 2020-02-20 ASSESSMENT — PAIN DESCRIPTION - ORIENTATION
ORIENTATION: LEFT

## 2020-02-20 ASSESSMENT — PAIN DESCRIPTION - PAIN TYPE
TYPE: SURGICAL PAIN

## 2020-02-20 ASSESSMENT — PAIN SCALES - WONG BAKER
WONGBAKER_NUMERICALRESPONSE: 6
WONGBAKER_NUMERICALRESPONSE: 4
WONGBAKER_NUMERICALRESPONSE: 2

## 2020-02-20 ASSESSMENT — PAIN DESCRIPTION - LOCATION
LOCATION: HIP

## 2020-02-20 ASSESSMENT — PAIN DESCRIPTION - FREQUENCY: FREQUENCY: INTERMITTENT

## 2020-02-20 ASSESSMENT — PAIN DESCRIPTION - ONSET: ONSET: ON-GOING

## 2020-02-20 ASSESSMENT — PAIN DESCRIPTION - DESCRIPTORS: DESCRIPTORS: ACHING

## 2020-02-20 NOTE — PROGRESS NOTES
5 weeks before she was functional enough to d/c home. DCP updated.    6:  Most likely d/c tomorrow to [facility] pending BP and repeat H&H.      Pinky CAVANAUGHC

## 2020-02-20 NOTE — PLAN OF CARE
Problem: Pain:  Goal: Control of acute pain  Description  Control of acute pain  Outcome: Ongoing  Note:   Pt rates pain using 0-10 scale. Instructed pt to call with increasing pain or ineffective pain relief after taking pain medication. Pt verbalized understanding. Call light within reach, will continue to monitor.

## 2020-02-20 NOTE — PROGRESS NOTES
Occupational Therapy  Facility/Department: [facility] C5 - MED SURG/ORTHO  Daily Treatment Note  NAME: Mariam Jacome  : 1945  MRN: [id number]    Date of Service: 2020    Discharge Recommendations:  Subacute/Skilled Nursing Facility     Barriers to home discharge:   [x] Steps to access home entry or bed/bath:   [] No rail with steps to access home entry or bed/bath   [x] Reported available assist at home upon discharge limited   [x] Patient or family requests DC to other than home    [x] Patient reports expectation of post acute Discharge disposition to other than home     Assessment   Performance deficits / Impairments: Decreased functional mobility ; Decreased safe awareness;Decreased balance;Decreased ADL status; Decreased strength;Decreased high-level IADLs  Assessment: Pt demos good progress toward OT goals. Pt max A for LE ADLs and requires cues for hip precautions with mobility and ADLs. Pt with minimal assistance available at d/c. Due to above noted functional performance deficits recommend pt continue to receive OT in SNF setting at d/c. Prognosis: Good  OT Education: OT Role;Transfer Training;Plan of Care;ADL Adaptive Strategies;Precautions  REQUIRES OT FOLLOW UP: Yes  Activity Tolerance  Activity Tolerance: Patient Tolerated treatment well  Safety Devices  Safety Devices in place: Yes  Type of devices: Call light within reach; Chair alarm in place; Left in chair;Gait belt;Nurse notified         Patient Diagnosis(es): The encounter diagnosis was Primary osteoarthritis of left hip.      has a past medical history of Deep vein thrombosis (DVT) ([facility] [address] Degenerative disc disease, lumbar, Hx of blood clots, Localized osteoarthrosis of left hip, and Neuropathy. has a past surgical history that includes Hysterectomy; Total hip arthroplasty (Right, 10/9/2019); joint replacement; hip surgery; and Total hip arthroplasty (Left, 2020).     Restrictions  Restrictions/Precautions  Restrictions/Precautions: General Precautions, ROM Restrictions, Fall Risk  Required Braces or Orthoses?: No  Position Activity Restriction  Hip Precautions: No ADduction, No hip external rotation, No active ABduction, No hip extension  Other position/activity restrictions: AL hip precautions     Subjective   General  Chart Reviewed: Yes  Patient assessed for rehabilitation services?: Yes  Response to previous treatment: Patient with no complaints from previous session  Family / Caregiver Present: No  Referring Practitioner: Wanda Beavers MD  Diagnosis: Primary OA left hip s/p Left total knee replacement (Anterolateral approach)    Subjective  Subjective: Pt up in chair, pleasant and agreeable to OT treatment.      Pain Assessment  Pain Assessment: Faces  Nix-Baker Pain Rating: Hurts little more  Pain Type: Surgical pain  Pain Location: Hip  Pain Orientation: Left  Non-Pharmaceutical Pain Intervention(s): Ambulation/Increased Activity;Repositioned  Pre Treatment Pain Screening  Intervention List: Patient able to continue with treatment  Vital Signs  Patient Currently in Pain: Yes     Orientation  Orientation  Overall Orientation Status: Within Functional Limits     Objective    ADL  Grooming: Stand by assistance(in stance at sink)  LE Dressing: Increased time to complete;Verbal cueing;Maximum assistance(briefs and socks)  Toileting: Minimal assistance     Balance  Sitting Balance: Supervision  Standing Balance: Stand by assistance(with RW)    Functional Mobility  Functional - Mobility Device: Rolling Walker  Activity: To/from bathroom  Assist Level: Stand by assistance  Functional Mobility Comments: min cues for sequencing with turns, backing up to toilet/chair     Toilet Transfers  Toilet - Technique: Ambulating  Equipment Used: Standard toilet(with use of grab bars)  Toilet Transfer: Contact guard assistance     Transfers  Sit to stand: Minimal assistance  Stand to sit: Contact guard assistance     Cognition  Overall Cognitive Status:

## 2020-02-20 NOTE — PROGRESS NOTES
Physical Therapy  Facility/Department: [facility] C5 - MED SURG/ORTHO  Daily Treatment Note  NAME: Pura Garcia  : 1945  MRN: [id number]    Date of Service: 2020    Discharge Recommendations:  Subacute/Skilled Nursing Facility   PT Equipment Recommendations  Equipment Needed: No    Assessment   Body structures, Functions, Activity limitations: Decreased functional mobility ; Decreased endurance;Decreased ROM; Decreased strength;Decreased balance; Increased pain  Assessment: Pt progressing, assist of one person this afternoon for all mobility, ambulate 50 ft with RW and CGA/sba x1. Treatment Diagnosis: decreased mobility  Prognosis: Good  Decision Making: Medium Complexity  Barriers to Learning: no  REQUIRES PT FOLLOW UP: Yes  Activity Tolerance  Activity Tolerance: Patient Tolerated treatment well     Patient Diagnosis(es): The encounter diagnosis was Primary osteoarthritis of left hip.     has a past medical history of Deep vein thrombosis (DVT) ([address] Degenerative disc disease, lumbar, Hx of blood clots, Localized osteoarthrosis of left hip, and Neuropathy. has a past surgical history that includes Hysterectomy; Total hip arthroplasty (Right, 10/9/2019); joint replacement; hip surgery; and Total hip arthroplasty (Left, 2020). Restrictions  Restrictions/Precautions  Restrictions/Precautions: General Precautions, ROM Restrictions, Fall Risk  Required Braces or Orthoses?: No  Position Activity Restriction  Hip Precautions: No ADduction, No hip external rotation, No active ABduction, No hip extension  Other position/activity restrictions: AL hip precautions  Subjective   General  Chart Reviewed: Yes  Response To Previous Treatment: Patient reporting fatigue but able to participate.   Family / Caregiver Present: No  Referring Practitioner: Franko Tripp MD  Subjective  Subjective: Pt resting in bed, agrees to PT  General Comment  Comments: Pt agreeable to PT  Pain Screening  Patient Currently in Pain: Yes  Pain Assessment  Pain Assessment: Faces  Nix-Baker Pain Rating: Hurts even more  Non-Pharmaceutical Pain Intervention(s): Ambulation/Increased Activity;Repositioned  Vital Signs  Patient Currently in Pain: Yes       Orientation  Orientation  Overall Orientation Status: Within Normal Limits  Cognition      Objective   Bed mobility  Supine to Sit: Minimal assistance  Transfers  Sit to Stand: Contact guard assistance  Stand to sit: Contact guard assistance  Ambulation  Ambulation?: Yes  WB Status: FWBAT  More Ambulation?: No(Pt c/o fatigue, pain in LLE)  Ambulation 1  Surface: level tile  Device: Rolling Walker  Assistance: Contact guard assistance;Stand by assistance  Quality of Gait: step to pattern, decreased stride, steady  Gait Deviations: Slow Lorna;Decreased step length;Decreased step height  Distance: 50 ft     Balance  Posture: Good  Sitting - Static: Good  Sitting - Dynamic: Good  Standing - Static: Fair;+  Standing - Dynamic: Fair;+  Exercises  Quad Sets: x 10 B   Heelslides: x 10 BLE  Gluteal Sets: x 10 B  Knee Long Arc Quad: x 10 BLE   Knee Short Arc Quad: x 10 BLE  Ankle Pumps: x 20 B      AM-PAC Score  AM-PAC Inpatient Mobility Raw Score : 16 (02/20/20 1304)  AM-PAC Inpatient T-Scale Score : 40.78 (02/20/20 1304)  Mobility Inpatient CMS 0-100% Score: 54.16 (02/20/20 1304)  Mobility Inpatient CMS G-Code Modifier : CK (02/20/20 1304)          Goals  Short term goals  Time Frame for Short term goals: 2/23/20 unless noted  Short term goal 1: Pt will perform bed mobility with mod A x1by 2/22/20; goal met 2/19/20  Short term goal 2: Pt will perform transfers with mod A x1; goal met 2/19/20  Short term goal 3: Pt will ambulate 20 ft with walker and CGA;  goal met 2/20/20  Short term goal 4: Pt will tolerate 12-15 reps of LE exercise for strengthening and balance  Patient Goals   Patient goals : \"to be able to walk\"    Plan    Plan  Times per week: BID x7  Times per day: Twice a day  Current Treatment

## 2020-02-20 NOTE — PLAN OF CARE
Problem: Pain:  Goal: Pain level will decrease  Description  Pain level will decrease  Outcome: Ongoing     Problem: Tissue Perfusion, Altered:  Goal: Circulatory function within specified parameters  Description  Circulatory function within specified parameters  Outcome: Ongoing

## 2020-02-21 VITALS
TEMPERATURE: 97.2 F | HEIGHT: 65 IN | SYSTOLIC BLOOD PRESSURE: 120 MMHG | OXYGEN SATURATION: 99 % | HEART RATE: 86 BPM | RESPIRATION RATE: 16 BRPM | BODY MASS INDEX: 35.49 KG/M2 | DIASTOLIC BLOOD PRESSURE: 83 MMHG | WEIGHT: 213 LBS

## 2020-02-21 LAB
BASOPHILS ABSOLUTE: 0.1 K/UL (ref 0–0.2)
BASOPHILS RELATIVE PERCENT: 0.7 %
EOSINOPHILS ABSOLUTE: 0.2 K/UL (ref 0–0.6)
EOSINOPHILS RELATIVE PERCENT: 1.6 %
HCT VFR BLD CALC: 24 % (ref 36–48)
HEMOGLOBIN: 8 G/DL (ref 12–16)
LYMPHOCYTES ABSOLUTE: 1.3 K/UL (ref 1–5.1)
LYMPHOCYTES RELATIVE PERCENT: 13.3 %
MCH RBC QN AUTO: 29.6 PG (ref 26–34)
MCHC RBC AUTO-ENTMCNC: 33.3 G/DL (ref 31–36)
MCV RBC AUTO: 88.9 FL (ref 80–100)
MONOCYTES ABSOLUTE: 0.7 K/UL (ref 0–1.3)
MONOCYTES RELATIVE PERCENT: 7.4 %
NEUTROPHILS ABSOLUTE: 7.5 K/UL (ref 1.7–7.7)
NEUTROPHILS RELATIVE PERCENT: 77 %
PDW BLD-RTO: 18 % (ref 12.4–15.4)
PLATELET # BLD: 163 K/UL (ref 135–450)
PMV BLD AUTO: 8.7 FL (ref 5–10.5)
RBC # BLD: 2.7 M/UL (ref 4–5.2)
WBC # BLD: 9.8 K/UL (ref 4–11)

## 2020-02-21 PROCEDURE — 97116 GAIT TRAINING THERAPY: CPT

## 2020-02-21 PROCEDURE — 97530 THERAPEUTIC ACTIVITIES: CPT

## 2020-02-21 PROCEDURE — 97535 SELF CARE MNGMENT TRAINING: CPT

## 2020-02-21 PROCEDURE — 97110 THERAPEUTIC EXERCISES: CPT

## 2020-02-21 PROCEDURE — 6370000000 HC RX 637 (ALT 250 FOR IP): Performed by: PHYSICIAN ASSISTANT

## 2020-02-21 PROCEDURE — 6370000000 HC RX 637 (ALT 250 FOR IP): Performed by: ANESTHESIOLOGY

## 2020-02-21 PROCEDURE — 2580000003 HC RX 258: Performed by: PHYSICIAN ASSISTANT

## 2020-02-21 PROCEDURE — 6360000002 HC RX W HCPCS: Performed by: PHYSICIAN ASSISTANT

## 2020-02-21 PROCEDURE — 36415 COLL VENOUS BLD VENIPUNCTURE: CPT

## 2020-02-21 PROCEDURE — 85025 COMPLETE CBC W/AUTO DIFF WBC: CPT

## 2020-02-21 RX ORDER — SENNA AND DOCUSATE SODIUM 50; 8.6 MG/1; MG/1
1 TABLET, FILM COATED ORAL 2 TIMES DAILY
Qty: 30 TABLET | Refills: 0
Start: 2020-02-21 | End: 2020-07-10

## 2020-02-21 RX ORDER — OXYCODONE HYDROCHLORIDE 5 MG/1
5-10 TABLET ORAL EVERY 4 HOURS PRN
Qty: 28 TABLET | Refills: 0 | Status: SHIPPED | OUTPATIENT
Start: 2020-02-21 | End: 2020-02-24

## 2020-02-21 RX ADMIN — KETOROLAC TROMETHAMINE 15 MG: 30 INJECTION, SOLUTION INTRAMUSCULAR at 13:24

## 2020-02-21 RX ADMIN — DOCUSATE SODIUM 100 MG: 100 CAPSULE, LIQUID FILLED ORAL at 09:00

## 2020-02-21 RX ADMIN — OXYCODONE 10 MG: 5 TABLET ORAL at 11:51

## 2020-02-21 RX ADMIN — PANTOPRAZOLE SODIUM 40 MG: 40 TABLET, DELAYED RELEASE ORAL at 09:00

## 2020-02-21 RX ADMIN — ACETAMINOPHEN 650 MG: 325 TABLET ORAL at 05:08

## 2020-02-21 RX ADMIN — GABAPENTIN 300 MG: 300 CAPSULE ORAL at 09:00

## 2020-02-21 RX ADMIN — CELECOXIB 100 MG: 100 CAPSULE ORAL at 09:00

## 2020-02-21 RX ADMIN — CYCLOBENZAPRINE 10 MG: 10 TABLET, FILM COATED ORAL at 15:03

## 2020-02-21 RX ADMIN — ACETAMINOPHEN 650 MG: 325 TABLET ORAL at 09:00

## 2020-02-21 RX ADMIN — APIXABAN 2.5 MG: 2.5 TABLET, FILM COATED ORAL at 09:00

## 2020-02-21 RX ADMIN — FERROUS SULFATE TAB 325 MG (65 MG ELEMENTAL FE) 325 MG: 325 (65 FE) TAB at 08:59

## 2020-02-21 RX ADMIN — SENNOSIDES AND DOCUSATE SODIUM 1 TABLET: 8.6; 5 TABLET ORAL at 09:00

## 2020-02-21 RX ADMIN — CYCLOBENZAPRINE 10 MG: 10 TABLET, FILM COATED ORAL at 09:05

## 2020-02-21 RX ADMIN — GABAPENTIN 300 MG: 300 CAPSULE ORAL at 14:28

## 2020-02-21 RX ADMIN — Medication 10 ML: at 09:05

## 2020-02-21 ASSESSMENT — PAIN SCALES - WONG BAKER
WONGBAKER_NUMERICALRESPONSE: 4
WONGBAKER_NUMERICALRESPONSE: 4

## 2020-02-21 ASSESSMENT — PAIN SCALES - GENERAL
PAINLEVEL_OUTOF10: 9
PAINLEVEL_OUTOF10: 3
PAINLEVEL_OUTOF10: 7
PAINLEVEL_OUTOF10: 4
PAINLEVEL_OUTOF10: 8

## 2020-02-21 ASSESSMENT — PAIN DESCRIPTION - PAIN TYPE: TYPE: SURGICAL PAIN

## 2020-02-21 ASSESSMENT — PAIN DESCRIPTION - ORIENTATION: ORIENTATION: LEFT

## 2020-02-21 ASSESSMENT — PAIN DESCRIPTION - LOCATION: LOCATION: HIP

## 2020-02-21 NOTE — DISCHARGE SUMMARY
Department of Orthopedic Surgery  Nurse Practitioner   Discharge Summary      The Ronit Conroy is a 76 y.o. female underwent total hip replacement procedure without complication. Ronit Conroy was admitted to the floor following their recovery in the PACU.      Discharge Diagnosis  left Hip Replacement    Current Inpatient Medications    Current Facility-Administered Medications: celecoxib (CELEBREX) capsule 100 mg, 100 mg, Oral, BID  gabapentin (NEURONTIN) capsule 300 mg, 300 mg, Oral, TID  cyclobenzaprine (FLEXERIL) tablet 10 mg, 10 mg, Oral, TID PRN  diphenhydrAMINE (BENADRYL) tablet 25 mg, 25 mg, Oral, Q6H PRN  ferrous sulfate tablet 325 mg, 325 mg, Oral, Daily with breakfast  pantoprazole (PROTONIX) tablet 40 mg, 40 mg, Oral, QAM  lactated ringers infusion, , Intravenous, Continuous  sodium chloride flush 0.9 % injection 10 mL, 10 mL, Intravenous, 2 times per day  sodium chloride flush 0.9 % injection 10 mL, 10 mL, Intravenous, PRN  acetaminophen (TYLENOL) tablet 650 mg, 650 mg, Oral, Q6H  oxyCODONE (ROXICODONE) immediate release tablet 5 mg, 5 mg, Oral, Q4H PRN **OR** oxyCODONE (ROXICODONE) immediate release tablet 10 mg, 10 mg, Oral, Q4H PRN  morphine (PF) injection 2 mg, 2 mg, Intravenous, Q2H PRN **OR** morphine (PF) injection 4 mg, 4 mg, Intravenous, Q2H PRN  docusate sodium (COLACE) capsule 100 mg, 100 mg, Oral, BID  sennosides-docusate sodium (SENOKOT-S) 8.6-50 MG tablet 1 tablet, 1 tablet, Oral, BID  magnesium hydroxide (MILK OF MAGNESIA) 400 MG/5ML suspension 30 mL, 30 mL, Oral, Daily PRN  ondansetron (ZOFRAN) injection 4 mg, 4 mg, Intravenous, Q6H PRN  apixaban (ELIQUIS) tablet 2.5 mg, 2.5 mg, Oral, BID  glucose (GLUTOSE) 40 % oral gel 15 g, 15 g, Oral, PRN  dextrose 50 % IV solution, 12.5 g, Intravenous, PRN  glucagon (rDNA) injection 1 mg, 1 mg, Intramuscular, PRN  dextrose 5 % solution, 100 mL/hr, Intravenous, PRN  ketorolac (TORADOL) injection 15 mg, 15 mg, Intravenous, Q6H be re-evaluated at her post op visit in 2 weeks. Patient was educated on dosing expectations and limits of prescribing as a result of the new [facility] rules enacted August 31, 2017. Please also note that this is not the initial opoid prescription issued to this patient but a continuation of medication utilized during the hospital admission as noted in the medical record. [place]ARRS report has also been utilized to screen for any abuse history or suspicious activity as outlined in [place]. All efforts have been taken to prevent abuse potential and misuse of opioid medications including education, screening, and close clinical follow up.

## 2020-02-21 NOTE — PROGRESS NOTES
Report called into CRISTHIAN Wright at [facility]. All questions and concerns were addressed. RN will continue to monitor until transport arrives.

## 2020-02-21 NOTE — PROGRESS NOTES
PT  General Comment  Comments: Pt agreeable to PT  Pain Screening  Patient Currently in Pain: Yes  Pain Assessment  Pain Assessment: Faces  Nix-Baker Pain Rating: Hurts little more  Non-Pharmaceutical Pain Intervention(s): Ambulation/Increased Activity;Repositioned  Vital Signs  Patient Currently in Pain: Yes       Orientation  Orientation  Overall Orientation Status: Within Normal Limits  Cognition      Objective   Bed mobility  Supine to Sit: Contact guard assistance;Minimal assistance  Transfers  Sit to Stand: Stand by assistance;Contact guard assistance  Stand to sit: Stand by assistance  Ambulation  Ambulation?: Yes  WB Status: FWBAT  Ambulation 1  Surface: level tile  Device: Rolling Walker  Assistance: Stand by assistance  Quality of Gait: step to pattern, decreased stride, steady  Gait Deviations: Slow Lorna;Decreased step length;Decreased step height  Distance: 15 ft, 55 ft     Balance  Posture: Good  Sitting - Static: Good  Sitting - Dynamic: Good  Standing - Static: Fair;+  Standing - Dynamic: Fair;+(W/ RW)  Exercises  Quad Sets: x 12 B   Heelslides: x 12 BLE  Gluteal Sets: x 12 B  Knee Long Arc Quad: x 12 BLE   Knee Short Arc Quad: x 12 BLE  Ankle Pumps: x 20 B         Comment: Reviewed hip precaution: pt verbalized understanding these.               G-Code     OutComes Score                                                     AM-PAC Score  AM-PAC Inpatient Mobility Raw Score : 18 (02/21/20 1215)  AM-PAC Inpatient T-Scale Score : 43.63 (02/21/20 1215)  Mobility Inpatient CMS 0-100% Score: 46.58 (02/21/20 1215)  Mobility Inpatient CMS G-Code Modifier : CK (02/21/20 1215)          Goals  Short term goals  Time Frame for Short term goals: 2/23/20 unless noted  Short term goal 1: Pt will perform bed mobility with mod A x1by 2/22/20; goal met 2/19/20  Short term goal 2: Pt will perform transfers with mod A x1; goal met 2/19/20  Short term goal 3: Pt will ambulate 20 ft with walker and CGA;  goal met 2/20/20  Short term goal 4: Pt will tolerate 12-15 reps of LE exercise for strengthening and balance goal met 2/19/20  Patient Goals   Patient goals : \"to be able to walk\"    Plan    Plan  Times per week: BID x7  Times per day: Twice a day  Current Treatment Recommendations: Strengthening, Gait Training, ROM, Balance Training, Functional Mobility Training, Endurance Training, Transfer Training  Safety Devices  Type of devices:  All fall risk precautions in place, Left in chair, Call light within reach, Chair alarm in place, Nurse notified, Gait belt, Patient at risk for falls     Therapy Time   Individual Concurrent Group Co-treatment   Time In 0837         Time Out 0915         Minutes 38         Timed Code Treatment Minutes: [address]

## 2020-02-21 NOTE — PLAN OF CARE
Problem: Pain:  Goal: Pain level will decrease  Description  Pain level will decrease  Outcome: Ongoing  Note:   Pt scoring pain on 0-10 scale. Pain medications given per MAR. Pt instructed to call out when pain level increasing. Call light within reach. Nurse will continue to reassess and monitor. Problem: Falls - Risk of:  Goal: Will remain free from falls  Description  Will remain free from falls  2/21/2020 0940 by Kaz Santana RN  Outcome: Ongoing  Note:   Bed in lowest position, wheels locked, 2/4 side rails up, nonskid footwear on. Bed/ chair check alarm in place, call light within reach. Pt instructed to call out when needing assistance. Pt stated understanding. Nurse will continue to monitor. 2/21/2020 0344 by Sierra Boles RN  Note:   Bed in lowest, locked position, side rails up X3, bed check in place, call light within reach. Instructed pt to call for assistance before getting out of bed, pt verbalized understanding. Will continue to monitor.       Problem: Tissue Perfusion, Altered:  Goal: Circulatory function within specified parameters  Description  Circulatory function within specified parameters  Outcome: Ongoing

## 2020-02-21 NOTE — DISCHARGE INSTR - COC
M25.551    Fall on same level from tripping as cause of accidental injury W01. 0XXA    Primary localized osteoarthritis of right hip M16.11    Status post total hip replacement, right Z96.641    Localized osteoarthrosis of left hip M16.12    Status post total replacement of left hip Z96.642       Isolation/Infection:   Isolation          No Isolation        Patient Infection Status     None to display          Nurse Assessment:  Last Vital Signs: BP (!) 142/83   Pulse 87   Temp 98.1 °F (36.7 °C) (Oral)   Resp 16   Ht 5' 5\" (1.651 m)   Wt 213 lb (96.6 kg)   SpO2 96%   BMI 35.45 kg/m²     Last documented pain score (0-10 scale): Pain Level: 9  Last Weight:   Wt Readings from Last 1 Encounters:   02/18/20 213 lb (96.6 kg)     Mental Status:  oriented and alert    IV Access:  - None    Nursing Mobility/ADLs:  Walking   Dependent  Transfer  Dependent  Bathing  Dependent  Dressing  Dependent  Toileting  Dependent  Feeding  Assisted  Med Admin  Assisted  Med Delivery   whole    Wound Care Documentation and Therapy:        Elimination:  Continence:   · Bowel: Yes  · Bladder: No  Urinary Catheter: None   Colostomy/Ileostomy/Ileal Conduit: No       Date of Last BM: 2/18/2020    Intake/Output Summary (Last 24 hours) at 2/21/2020 1154  Last data filed at 2/21/2020 0930  Gross per 24 hour   Intake 480 ml   Output --   Net 480 ml     I/O last 3 completed shifts: In: 480 [P.O.:480]  Out: -     Safety Concerns: At Risk for Falls    Impairments/Disabilities:      None    Nutrition Therapy:  Current Nutrition Therapy:   - Oral Diet:  General    Routes of Feeding: Oral  Liquids: Thin Liquids  Daily Fluid Restriction: no  Last Modified Barium Swallow with Video (Video Swallowing Test): not done    Treatments at the Time of Hospital Discharge:   Respiratory Treatments: ***  Oxygen Therapy:  is not on home oxygen therapy.   Ventilator:    - No ventilator support    Rehab Therapies: Physical Therapy and Occupational

## 2020-02-21 NOTE — CARE COORDINATION
CASE MANAGEMENT DISCHARGE SUMMARY      [facility]: [facility] Prec[facility]ification completed: Notification only. Hospital Exemption Notification (HENS) completed: Yes    New Durable Medical Equipment ordered/agency: None    Transportation:    Family/car:Squad   Medical Transport explained to pt/family. Pt/family voice no agency preference. Agency used:[facility]    time:1530   Ambulance form completed: Yes    Confirmed discharge plan with:Yes, LVM with sister Titi Reyes. Facility/Agency, name:  [facility] faxed-yes    Phone number for report to facility: [phone]. RN, name: Jo Ann    Note: Discharging nurse to complete LYLY, reconcile AVS, and place final copy with patient's discharge packet. RN to ensure that written prescriptions for  Level II medications are sent with patient to the facility as per protocol.

## 2020-02-26 ENCOUNTER — TELEPHONE (OUTPATIENT)
Dept: ORTHOPEDIC SURGERY | Age: 75
End: 2020-02-26

## 2020-02-26 NOTE — TELEPHONE ENCOUNTER
Spoke with pt, pt is currently at the [facility]. Incision status: No drainage or redness    Edema/Swelling/Teds: Has been icing and elevating. Pain level and status: Managing well. Just really sore. Use of pain medications: Taking oxycodone and it is helping.      Follow up appointments:    Future Appointments   Date Time Provider Lazara Patel   3/2/2020 11:00 AM Kami Arce

## 2020-03-04 ENCOUNTER — TELEPHONE (OUTPATIENT)
Dept: ORTHOPEDIC SURGERY | Age: 75
End: 2020-03-04

## 2020-03-04 NOTE — TELEPHONE ENCOUNTER
Attempted to contact pt, left voicemail with purpose and call back number.     Cristiano Larkin  Orthopedic Nurse Navigator  Phone number: [phone]    Follow up appointments:    Future Appointments   Date Time Provider Lazara Patel   3/6/2020 12:20 PM ALANNAH Moses AND JUAN

## 2020-03-06 ENCOUNTER — OFFICE VISIT (OUTPATIENT)
Dept: ORTHOPEDIC SURGERY | Age: 75
End: 2020-03-06

## 2020-03-06 PROCEDURE — 99024 POSTOP FOLLOW-UP VISIT: CPT | Performed by: PHYSICIAN ASSISTANT

## 2020-03-12 ENCOUNTER — TELEPHONE (OUTPATIENT)
Dept: ORTHOPEDIC SURGERY | Age: 75
End: 2020-03-12

## 2020-03-12 NOTE — TELEPHONE ENCOUNTER
Nurse Navigator called patient for one final follow up: Things are going well, just very weak. Still at the [facility]. Pt has no questions or concerns. Signed off from pt. Instructed pt to call RN or [place] office with any issues or concerns.     Marty Romero  Orthopedic Nurse Navigator  Phone number: [phone]

## 2020-03-26 ENCOUNTER — TELEPHONE (OUTPATIENT)
Dept: ORTHOPEDIC SURGERY | Age: 75
End: 2020-03-26

## 2020-03-26 NOTE — TELEPHONE ENCOUNTER
Patient has an appt on 4/2. She is still at [facility]. Wants to know what she should do about her appt. She said [facility] wants her to keep the appt. Her sister was going to bring her. Please advise.

## 2020-03-27 NOTE — TELEPHONE ENCOUNTER
If patient is doing well in terms of pain control and has had no new injury since her last visit I feel it is reasonable to postpone her next appointment in the office until after then pandemic calms down. Bringing the coronavirus back to the nursing home could be devastating. If patient is going to cancel her appointment, as we recommend, I can have Lian Berrios send over what exercises they should start with physical therapy on Monday.   Let me know

## 2020-04-17 RX ORDER — CYCLOBENZAPRINE HCL 10 MG
10 TABLET ORAL 3 TIMES DAILY PRN
Qty: 30 TABLET | Refills: 0 | Status: SHIPPED | OUTPATIENT
Start: 2020-04-17 | End: 2020-05-28 | Stop reason: SDUPTHER

## 2020-04-20 ENCOUNTER — VIRTUAL VISIT (OUTPATIENT)
Dept: ORTHOPEDIC SURGERY | Age: 75
End: 2020-04-20

## 2020-04-20 PROCEDURE — 99024 POSTOP FOLLOW-UP VISIT: CPT | Performed by: ORTHOPAEDIC SURGERY

## 2020-04-20 NOTE — PATIENT INSTRUCTIONS
Due to COVID 19 outbreak, patient's office visit was converted to a virtual visit. Patient was contacted and agreed to proceed with a virtual video visit. The risks and benefits of converting to a virtual visit were discussed in light of the current infectious disease epidemic. Patient also understood that insurance coverage and co-pays are up to their individual insurance plans.

## 2020-05-28 ENCOUNTER — OFFICE VISIT (OUTPATIENT)
Dept: ORTHOPEDIC SURGERY | Age: 75
End: 2020-05-28
Payer: MEDICARE

## 2020-05-28 PROCEDURE — 1090F PRES/ABSN URINE INCON ASSESS: CPT | Performed by: ORTHOPAEDIC SURGERY

## 2020-05-28 PROCEDURE — G8400 PT W/DXA NO RESULTS DOC: HCPCS | Performed by: ORTHOPAEDIC SURGERY

## 2020-05-28 PROCEDURE — 1036F TOBACCO NON-USER: CPT | Performed by: ORTHOPAEDIC SURGERY

## 2020-05-28 PROCEDURE — 4040F PNEUMOC VAC/ADMIN/RCVD: CPT | Performed by: ORTHOPAEDIC SURGERY

## 2020-05-28 PROCEDURE — G8417 CALC BMI ABV UP PARAM F/U: HCPCS | Performed by: ORTHOPAEDIC SURGERY

## 2020-05-28 PROCEDURE — 3017F COLORECTAL CA SCREEN DOC REV: CPT | Performed by: ORTHOPAEDIC SURGERY

## 2020-05-28 PROCEDURE — 1123F ACP DISCUSS/DSCN MKR DOCD: CPT | Performed by: ORTHOPAEDIC SURGERY

## 2020-05-28 PROCEDURE — G8427 DOCREV CUR MEDS BY ELIG CLIN: HCPCS | Performed by: ORTHOPAEDIC SURGERY

## 2020-05-28 PROCEDURE — 99212 OFFICE O/P EST SF 10 MIN: CPT | Performed by: ORTHOPAEDIC SURGERY

## 2020-05-28 RX ORDER — CYCLOBENZAPRINE HCL 10 MG
10 TABLET ORAL 3 TIMES DAILY PRN
Qty: 30 TABLET | Refills: 0 | Status: SHIPPED | OUTPATIENT
Start: 2020-05-28 | End: 2020-06-07

## 2020-05-28 RX ORDER — AMOXICILLIN AND CLAVULANATE POTASSIUM 875; 125 MG/1; MG/1
TABLET, FILM COATED ORAL
Qty: 6 TABLET | Refills: 0 | Status: SHIPPED | OUTPATIENT
Start: 2020-05-28 | End: 2020-07-10

## 2020-05-28 NOTE — PROGRESS NOTES
TOTAL HIP MAKOPLASTY WITH CELLSAVER AND FASCIAILIACA BLOCK FOR PAIN CONTROL   ITZEL RUANO  CPT CODE - 13002, 68922 performed by Idania Bennett MD at [facility] History   Problem Relation Age of Onset    Heart Disease Mother     Diabetes Father     Heart Disease Father      Social History     Socioeconomic History    Marital status:       Spouse name: None    Number of children: None    Years of education: None    Highest education level: None   Occupational History    None   Social Needs    Financial resource strain: None    Food insecurity     Worry: None     Inability: None    Transportation needs     Medical: None     Non-medical: None   Tobacco Use    Smoking status: Never Smoker    Smokeless tobacco: Never Used   Substance and Sexual Activity    Alcohol use: Never     Frequency: Never    Drug use: Never    Sexual activity: None   Lifestyle    Physical activity     Days per week: None     Minutes per session: None    Stress: None   Relationships    Social connections     Talks on phone: None     Gets together: None     Attends Evangelical service: None     Active member of club or organization: None     Attends meetings of clubs or organizations: None     Relationship status: None    Intimate partner violence     Fear of current or ex partner: None     Emotionally abused: None     Physically abused: None     Forced sexual activity: None   Other Topics Concern    None   Social History Narrative    None     Current Outpatient Medications   Medication Sig Dispense Refill    sennosides-docusate sodium (SENOKOT-S) 8.6-50 MG tablet Take 1 tablet by mouth 2 times daily 30 tablet 0    diphenhydrAMINE (BENADRYL) 25 MG capsule Take 25 mg by mouth every 6 hours as needed for Itching      acetaminophen (TYLENOL) 325 MG tablet Take 650 mg by mouth every 6 hours as needed for Pain      ferrous sulfate 325 (65 Fe) MG tablet Take 325 mg by mouth daily (with breakfast)      Omeprazole against resistance. Strength is 5/5 throughout all planes. Impression:  Encounter Diagnosis   Name Primary?  Status post total replacement of left hip        Office Procedures:  No orders of the defined types were placed in this encounter. Treatment Plan: Patient is doing very well 14 weeks out from surgery. She did use a walker before surgery mostly because of her hip pain. Her therapist will continue to work on hip strengthening and range of motion. She can transition to a cane as her symptoms allow and her function improves. We would like to see the patient back 1 year from surgery. We have discussed predental and preinvasive procedure antibiotics and she expresses understanding. We will refill her Flexeril and send predental antibiotics to the pharmacy.

## 2020-07-10 ENCOUNTER — HOSPITAL ENCOUNTER (INPATIENT)
Age: 75
LOS: 2 days | Discharge: NURSING, REHAB OR LONG-TERM CARE | DRG: 690 | End: 2020-07-14
Attending: EMERGENCY MEDICINE | Admitting: INTERNAL MEDICINE
Payer: MEDICARE

## 2020-07-10 LAB
A/G RATIO: 1.4 (ref 1.1–2.2)
ALBUMIN SERPL-MCNC: 4.2 G/DL (ref 3.4–5)
ALP BLD-CCNC: 96 U/L (ref 40–129)
ALT SERPL-CCNC: 22 U/L (ref 10–40)
ANION GAP SERPL CALCULATED.3IONS-SCNC: 10 MMOL/L (ref 3–16)
AST SERPL-CCNC: 18 U/L (ref 15–37)
BACTERIA: ABNORMAL /HPF
BASOPHILS ABSOLUTE: 0.1 K/UL (ref 0–0.2)
BASOPHILS RELATIVE PERCENT: 0.8 %
BILIRUB SERPL-MCNC: <0.2 MG/DL (ref 0–1)
BILIRUBIN URINE: NEGATIVE
BLOOD, URINE: NEGATIVE
BUN BLDV-MCNC: 17 MG/DL (ref 7–20)
CALCIUM SERPL-MCNC: 9.6 MG/DL (ref 8.3–10.6)
CHLORIDE BLD-SCNC: 104 MMOL/L (ref 99–110)
CLARITY: CLEAR
CO2: 27 MMOL/L (ref 21–32)
COLOR: YELLOW
CREAT SERPL-MCNC: 0.7 MG/DL (ref 0.6–1.2)
EOSINOPHILS ABSOLUTE: 0.2 K/UL (ref 0–0.6)
EOSINOPHILS RELATIVE PERCENT: 1.7 %
EPITHELIAL CELLS, UA: ABNORMAL /HPF (ref 0–5)
GFR AFRICAN AMERICAN: >60
GFR NON-AFRICAN AMERICAN: >60
GLOBULIN: 3.1 G/DL
GLUCOSE BLD-MCNC: 101 MG/DL (ref 70–99)
GLUCOSE URINE: NEGATIVE MG/DL
HCT VFR BLD CALC: 38 % (ref 36–48)
HEMOGLOBIN: 12.7 G/DL (ref 12–16)
KETONES, URINE: NEGATIVE MG/DL
LEUKOCYTE ESTERASE, URINE: NEGATIVE
LYMPHOCYTES ABSOLUTE: 2.9 K/UL (ref 1–5.1)
LYMPHOCYTES RELATIVE PERCENT: 28.6 %
MCH RBC QN AUTO: 31.3 PG (ref 26–34)
MCHC RBC AUTO-ENTMCNC: 33.5 G/DL (ref 31–36)
MCV RBC AUTO: 93.4 FL (ref 80–100)
MICROSCOPIC EXAMINATION: YES
MONOCYTES ABSOLUTE: 0.8 K/UL (ref 0–1.3)
MONOCYTES RELATIVE PERCENT: 8.2 %
MUCUS: ABNORMAL /LPF
NEUTROPHILS ABSOLUTE: 6.1 K/UL (ref 1.7–7.7)
NEUTROPHILS RELATIVE PERCENT: 60.7 %
NITRITE, URINE: POSITIVE
PDW BLD-RTO: 14.1 % (ref 12.4–15.4)
PH UA: 5.5 (ref 5–8)
PLATELET # BLD: 202 K/UL (ref 135–450)
PMV BLD AUTO: 7.9 FL (ref 5–10.5)
POTASSIUM SERPL-SCNC: 4.5 MMOL/L (ref 3.5–5.1)
PROTEIN UA: NEGATIVE MG/DL
RBC # BLD: 4.07 M/UL (ref 4–5.2)
RBC UA: ABNORMAL /HPF (ref 0–4)
SODIUM BLD-SCNC: 141 MMOL/L (ref 136–145)
SPECIFIC GRAVITY UA: >=1.03 (ref 1–1.03)
TOTAL PROTEIN: 7.3 G/DL (ref 6.4–8.2)
URINE REFLEX TO CULTURE: YES
URINE TYPE: ABNORMAL
UROBILINOGEN, URINE: 0.2 E.U./DL
WBC # BLD: 10.1 K/UL (ref 4–11)
WBC UA: ABNORMAL /HPF (ref 0–5)

## 2020-07-10 PROCEDURE — 85025 COMPLETE CBC W/AUTO DIFF WBC: CPT

## 2020-07-10 PROCEDURE — 81001 URINALYSIS AUTO W/SCOPE: CPT

## 2020-07-10 PROCEDURE — 99284 EMERGENCY DEPT VISIT MOD MDM: CPT

## 2020-07-10 PROCEDURE — 6370000000 HC RX 637 (ALT 250 FOR IP): Performed by: EMERGENCY MEDICINE

## 2020-07-10 PROCEDURE — 87186 SC STD MICRODIL/AGAR DIL: CPT

## 2020-07-10 PROCEDURE — 87077 CULTURE AEROBIC IDENTIFY: CPT

## 2020-07-10 PROCEDURE — 87086 URINE CULTURE/COLONY COUNT: CPT

## 2020-07-10 PROCEDURE — 80053 COMPREHEN METABOLIC PANEL: CPT

## 2020-07-10 RX ORDER — CYCLOBENZAPRINE HCL 10 MG
10 TABLET ORAL ONCE
Status: COMPLETED | OUTPATIENT
Start: 2020-07-10 | End: 2020-07-10

## 2020-07-10 RX ORDER — CYCLOBENZAPRINE HCL 10 MG
10 TABLET ORAL 3 TIMES DAILY PRN
Status: ON HOLD | COMMUNITY
End: 2021-07-16 | Stop reason: SDUPTHER

## 2020-07-10 RX ORDER — BUTALBITAL, ACETAMINOPHEN AND CAFFEINE 50; 325; 40 MG/1; MG/1; MG/1
1 TABLET ORAL ONCE
Status: COMPLETED | OUTPATIENT
Start: 2020-07-10 | End: 2020-07-10

## 2020-07-10 RX ORDER — ACETAMINOPHEN 325 MG/1
650 TABLET ORAL ONCE
Status: COMPLETED | OUTPATIENT
Start: 2020-07-10 | End: 2020-07-10

## 2020-07-10 RX ORDER — CEFDINIR 300 MG/1
300 CAPSULE ORAL 2 TIMES DAILY
Qty: 14 CAPSULE | Refills: 0 | Status: SHIPPED | OUTPATIENT
Start: 2020-07-10 | End: 2020-07-13 | Stop reason: HOSPADM

## 2020-07-10 RX ORDER — CEFDINIR 300 MG/1
300 CAPSULE ORAL ONCE
Status: COMPLETED | OUTPATIENT
Start: 2020-07-10 | End: 2020-07-10

## 2020-07-10 RX ADMIN — CYCLOBENZAPRINE HYDROCHLORIDE 10 MG: 10 TABLET, FILM COATED ORAL at 22:13

## 2020-07-10 RX ADMIN — ACETAMINOPHEN 650 MG: 325 TABLET ORAL at 22:13

## 2020-07-10 RX ADMIN — CEFDINIR 300 MG: 300 CAPSULE ORAL at 22:52

## 2020-07-10 RX ADMIN — BUTALBITAL, ACETAMINOPHEN, AND CAFFEINE 1 TABLET: 50; 325; 40 TABLET ORAL at 22:52

## 2020-07-10 ASSESSMENT — PAIN SCALES - GENERAL
PAINLEVEL_OUTOF10: 7
PAINLEVEL_OUTOF10: 7

## 2020-07-11 PROBLEM — N30.00 ACUTE CYSTITIS WITHOUT HEMATURIA: Status: ACTIVE | Noted: 2020-07-11

## 2020-07-11 PROBLEM — R26.2 UNABLE TO AMBULATE: Status: ACTIVE | Noted: 2020-07-11

## 2020-07-11 LAB
ANION GAP SERPL CALCULATED.3IONS-SCNC: 8 MMOL/L (ref 3–16)
BASOPHILS ABSOLUTE: 0 K/UL (ref 0–0.2)
BASOPHILS RELATIVE PERCENT: 0.7 %
BUN BLDV-MCNC: 16 MG/DL (ref 7–20)
CALCIUM SERPL-MCNC: 9.1 MG/DL (ref 8.3–10.6)
CHLORIDE BLD-SCNC: 106 MMOL/L (ref 99–110)
CO2: 27 MMOL/L (ref 21–32)
CREAT SERPL-MCNC: 0.7 MG/DL (ref 0.6–1.2)
EOSINOPHILS ABSOLUTE: 0.2 K/UL (ref 0–0.6)
EOSINOPHILS RELATIVE PERCENT: 2.8 %
GFR AFRICAN AMERICAN: >60
GFR NON-AFRICAN AMERICAN: >60
GLUCOSE BLD-MCNC: 130 MG/DL (ref 70–99)
HCT VFR BLD CALC: 34.3 % (ref 36–48)
HEMOGLOBIN: 11.7 G/DL (ref 12–16)
LYMPHOCYTES ABSOLUTE: 2.2 K/UL (ref 1–5.1)
LYMPHOCYTES RELATIVE PERCENT: 29.6 %
MCH RBC QN AUTO: 31.9 PG (ref 26–34)
MCHC RBC AUTO-ENTMCNC: 34.2 G/DL (ref 31–36)
MCV RBC AUTO: 93.2 FL (ref 80–100)
MONOCYTES ABSOLUTE: 0.7 K/UL (ref 0–1.3)
MONOCYTES RELATIVE PERCENT: 9.1 %
NEUTROPHILS ABSOLUTE: 4.2 K/UL (ref 1.7–7.7)
NEUTROPHILS RELATIVE PERCENT: 57.8 %
PDW BLD-RTO: 13.6 % (ref 12.4–15.4)
PLATELET # BLD: 174 K/UL (ref 135–450)
PMV BLD AUTO: 8.5 FL (ref 5–10.5)
POTASSIUM REFLEX MAGNESIUM: 3.9 MMOL/L (ref 3.5–5.1)
RBC # BLD: 3.68 M/UL (ref 4–5.2)
SODIUM BLD-SCNC: 141 MMOL/L (ref 136–145)
WBC # BLD: 7.3 K/UL (ref 4–11)

## 2020-07-11 PROCEDURE — 80048 BASIC METABOLIC PNL TOTAL CA: CPT

## 2020-07-11 PROCEDURE — 97110 THERAPEUTIC EXERCISES: CPT

## 2020-07-11 PROCEDURE — 6370000000 HC RX 637 (ALT 250 FOR IP): Performed by: NURSE PRACTITIONER

## 2020-07-11 PROCEDURE — 97166 OT EVAL MOD COMPLEX 45 MIN: CPT

## 2020-07-11 PROCEDURE — G0378 HOSPITAL OBSERVATION PER HR: HCPCS

## 2020-07-11 PROCEDURE — 96372 THER/PROPH/DIAG INJ SC/IM: CPT

## 2020-07-11 PROCEDURE — 6360000002 HC RX W HCPCS: Performed by: NURSE PRACTITIONER

## 2020-07-11 PROCEDURE — 2580000003 HC RX 258: Performed by: NURSE PRACTITIONER

## 2020-07-11 PROCEDURE — 85025 COMPLETE CBC W/AUTO DIFF WBC: CPT

## 2020-07-11 PROCEDURE — 97535 SELF CARE MNGMENT TRAINING: CPT

## 2020-07-11 PROCEDURE — 96365 THER/PROPH/DIAG IV INF INIT: CPT

## 2020-07-11 PROCEDURE — 96375 TX/PRO/DX INJ NEW DRUG ADDON: CPT

## 2020-07-11 PROCEDURE — 97162 PT EVAL MOD COMPLEX 30 MIN: CPT

## 2020-07-11 PROCEDURE — 36415 COLL VENOUS BLD VENIPUNCTURE: CPT

## 2020-07-11 RX ORDER — SODIUM CHLORIDE 0.9 % (FLUSH) 0.9 %
10 SYRINGE (ML) INJECTION PRN
Status: DISCONTINUED | OUTPATIENT
Start: 2020-07-11 | End: 2020-07-14 | Stop reason: HOSPADM

## 2020-07-11 RX ORDER — ACETAMINOPHEN 325 MG/1
650 TABLET ORAL EVERY 6 HOURS PRN
Status: DISCONTINUED | OUTPATIENT
Start: 2020-07-11 | End: 2020-07-14 | Stop reason: HOSPADM

## 2020-07-11 RX ORDER — POLYETHYLENE GLYCOL 3350 17 G/17G
17 POWDER, FOR SOLUTION ORAL DAILY PRN
Status: DISCONTINUED | OUTPATIENT
Start: 2020-07-11 | End: 2020-07-14 | Stop reason: HOSPADM

## 2020-07-11 RX ORDER — SODIUM CHLORIDE 9 MG/ML
INJECTION, SOLUTION INTRAVENOUS CONTINUOUS
Status: DISCONTINUED | OUTPATIENT
Start: 2020-07-11 | End: 2020-07-13

## 2020-07-11 RX ORDER — ACETAMINOPHEN 650 MG/1
650 SUPPOSITORY RECTAL EVERY 6 HOURS PRN
Status: DISCONTINUED | OUTPATIENT
Start: 2020-07-11 | End: 2020-07-14 | Stop reason: HOSPADM

## 2020-07-11 RX ORDER — ONDANSETRON 2 MG/ML
4 INJECTION INTRAMUSCULAR; INTRAVENOUS EVERY 6 HOURS PRN
Status: DISCONTINUED | OUTPATIENT
Start: 2020-07-11 | End: 2020-07-14 | Stop reason: HOSPADM

## 2020-07-11 RX ORDER — SODIUM CHLORIDE 0.9 % (FLUSH) 0.9 %
10 SYRINGE (ML) INJECTION EVERY 12 HOURS SCHEDULED
Status: DISCONTINUED | OUTPATIENT
Start: 2020-07-11 | End: 2020-07-14 | Stop reason: HOSPADM

## 2020-07-11 RX ORDER — M-VIT,TX,IRON,MINS/CALC/FOLIC 27MG-0.4MG
1 TABLET ORAL DAILY
Status: DISCONTINUED | OUTPATIENT
Start: 2020-07-11 | End: 2020-07-14 | Stop reason: HOSPADM

## 2020-07-11 RX ORDER — PROMETHAZINE HYDROCHLORIDE 25 MG/1
12.5 TABLET ORAL EVERY 6 HOURS PRN
Status: DISCONTINUED | OUTPATIENT
Start: 2020-07-11 | End: 2020-07-14 | Stop reason: HOSPADM

## 2020-07-11 RX ORDER — CYCLOBENZAPRINE HCL 10 MG
10 TABLET ORAL 3 TIMES DAILY PRN
Status: DISCONTINUED | OUTPATIENT
Start: 2020-07-11 | End: 2020-07-14 | Stop reason: HOSPADM

## 2020-07-11 RX ADMIN — CYCLOBENZAPRINE HYDROCHLORIDE 10 MG: 10 TABLET, FILM COATED ORAL at 14:32

## 2020-07-11 RX ADMIN — Medication 1 TABLET: at 09:30

## 2020-07-11 RX ADMIN — CEFTRIAXONE SODIUM 1 G: 1 INJECTION, POWDER, FOR SOLUTION INTRAMUSCULAR; INTRAVENOUS at 04:30

## 2020-07-11 RX ADMIN — SODIUM CHLORIDE: 9 INJECTION, SOLUTION INTRAVENOUS at 18:14

## 2020-07-11 RX ADMIN — ONDANSETRON 4 MG: 2 INJECTION INTRAMUSCULAR; INTRAVENOUS at 09:38

## 2020-07-11 RX ADMIN — SODIUM CHLORIDE: 9 INJECTION, SOLUTION INTRAVENOUS at 04:29

## 2020-07-11 RX ADMIN — CYCLOBENZAPRINE HYDROCHLORIDE 10 MG: 10 TABLET, FILM COATED ORAL at 23:02

## 2020-07-11 RX ADMIN — ACETAMINOPHEN 650 MG: 325 TABLET ORAL at 20:51

## 2020-07-11 RX ADMIN — ENOXAPARIN SODIUM 40 MG: 40 INJECTION SUBCUTANEOUS at 09:30

## 2020-07-11 RX ADMIN — Medication 10 ML: at 20:51

## 2020-07-11 RX ADMIN — ACETAMINOPHEN 650 MG: 325 TABLET ORAL at 14:35

## 2020-07-11 ASSESSMENT — PAIN DESCRIPTION - LOCATION
LOCATION: BACK
LOCATION: HIP
LOCATION: LEG
LOCATION: HEAD;HIP;ABDOMEN

## 2020-07-11 ASSESSMENT — PAIN SCALES - GENERAL
PAINLEVEL_OUTOF10: 6
PAINLEVEL_OUTOF10: 5
PAINLEVEL_OUTOF10: 7
PAINLEVEL_OUTOF10: 5

## 2020-07-11 ASSESSMENT — PAIN DESCRIPTION - PAIN TYPE
TYPE: ACUTE PAIN;CHRONIC PAIN
TYPE: ACUTE PAIN;CHRONIC PAIN
TYPE: CHRONIC PAIN
TYPE: ACUTE PAIN;CHRONIC PAIN
TYPE: ACUTE PAIN;CHRONIC PAIN

## 2020-07-11 ASSESSMENT — PAIN DESCRIPTION - FREQUENCY: FREQUENCY: CONTINUOUS

## 2020-07-11 ASSESSMENT — PAIN DESCRIPTION - ORIENTATION
ORIENTATION: RIGHT;LEFT
ORIENTATION: LEFT
ORIENTATION: LOWER

## 2020-07-11 NOTE — ED PROVIDER NOTES
CHIEF COMPLAINT  Urinary Tract Infection (pt states she believes she has a UTI. states burning and blood in urine)      HISTORY OF PRESENT ILLNESS  Precious Holguin is a 76 y.o. female who presents to the ED with report of burning with urination and hematuria, also body aches. No dizziness or fevers o chills, no back pain. No other complaints, modifying factors or associated symptoms. I have reviewed the following from the nursing documentation. Past Medical History:   Diagnosis Date    Deep vein thrombosis (DVT) (HCC)     RLE    Degenerative disc disease, lumbar     Hx of blood clots     Localized osteoarthrosis of left hip 2/18/2020    Neuropathy      Past Surgical History:   Procedure Laterality Date    HIP SURGERY      HYSTERECTOMY      JOINT REPLACEMENT      TOTAL HIP ARTHROPLASTY Right 10/9/2019    RIGHT LATERAL TOTAL HIP MAKOPLASTY WITH CELLSAVER, [address] FOR PAIN CONTROL     ITZEL BINDU  CPT CODE - 70116, 14629 performed by Jason Hancock MD at [facility] Left 2/18/2020    LEFT LATERAL TOTAL HIP [address] PAIN CONTROL   ITZEL BINDU  CPT CODE - 82771, 07683 performed by Jason Hancock MD at [facility] History   Problem Relation Age of Onset    Heart Disease Mother     Diabetes Father     Heart Disease Father      Social History     Socioeconomic History    Marital status:       Spouse name: Not on file    Number of children: Not on file    Years of education: Not on file    Highest education level: Not on file   Occupational History    Not on file   Social Needs    Financial resource strain: Not on file    Food insecurity     Worry: Not on file     Inability: Not on file    Transportation needs     Medical: Not on file     Non-medical: Not on file   Tobacco Use    Smoking status: Never Smoker    Smokeless tobacco: Never Used   Substance and Sexual Activity    Alcohol use: Never     Frequency: distress. HEAD: Normocephalic. Atraumatic. EYES: PERRL. EOM's grossly intact. ENT: Mucous membranes are moist.   NECK: Supple. HEART: RRR. CHEST/LUNGS: Chest atraumatic, nontender, respirations unlabored. CTAB. Good air exchange. Speaking comfortably in full sentences. BACK: No midline spinal tenderness or step-off. ABDOMEN: Soft. Non-distended. Non-tender. No guarding or rebound. Normal bowel sounds. EXTREMITIES: No peripheral edema. Moves all extremities equally. All extremities neurovascularly intact. RECTAL/: Deferred  SKIN: Warm and dry. No acute rashes. NEUROLOGICAL: Alert and oriented. CN 2-12 intact, No gross facial drooping. Strength 5/5, sensation intact. Normal coordination. Gait normal.   PSYCHIATRIC: Normal mood and affect. LABS  I have reviewed all labs for this visit. Results for orders placed or performed during the hospital encounter of 07/10/20   Urinalysis Reflex to Culture   Result Value Ref Range    Color, UA Yellow Straw/Yellow    Clarity, UA Clear Clear    Glucose, Ur Negative Negative mg/dL    Bilirubin Urine Negative Negative    Ketones, Urine Negative Negative mg/dL    Specific Gravity, UA >=1.030 1.005 - 1.030    Blood, Urine Negative Negative    pH, UA 5.5 5.0 - 8.0    Protein, UA Negative Negative mg/dL    Urobilinogen, Urine 0.2 <2.0 E.U./dL    Nitrite, Urine POSITIVE (A) Negative    Leukocyte Esterase, Urine Negative Negative    Microscopic Examination YES     Urine Type NotGiven     Urine Reflex to Culture Yes    Microscopic Urinalysis   Result Value Ref Range    Mucus, UA 1+ (A) None Seen /LPF    WBC, UA 10-20 (A) 0 - 5 /HPF    RBC, UA 0-2 0 - 4 /HPF    Epithelial Cells, UA 11-20 (A) 0 - 5 /HPF    Bacteria, UA 2+ (A) None Seen /HPF       ED COURSE/MDM  Patient seen and evaluated. Pt reports significant hip pain, cannot ambulate even with x2 assist, will check labs and will likely need admit/rehab, lives alone.    All diagnostic tests reviewed and results discussed with patient. Plan of care discussed with patient. Patient in agreement with plan. New Prescriptions    CEFDINIR (OMNICEF) 300 MG CAPSULE    Take 1 capsule by mouth 2 times daily for 7 days       CLINICAL IMPRESSION  1. Acute cystitis without hematuria        Blood pressure (!) 158/73, pulse 74, temperature 98.1 °F (36.7 °C), temperature source Oral, resp. rate 16, height 5' 4\" (1.626 m), weight 213 lb (96.6 kg), SpO2 100 %. [facility] was discharged to home in stable condition. This chart was generated in part by using Dragon Dictation system and may contain errors related to that system including errors in grammar, punctuation, and spelling, as well as words and phrases that may be inappropriate. When dictating, effort is made to correct spelling/grammar errors. If there are any questions or concerns please feel free to contact the dictating provider for clarification.      Elissa Noyola DO  [facility], [address] [facility], DO  07/10/20 [facility]  07/11/20 2333

## 2020-07-11 NOTE — ED NOTES
Bed: 15  Expected date:   Expected time:   Means of arrival:   Comments:  mono Ortiz, [facility]0 [facility]  07/10/20 2101

## 2020-07-11 NOTE — H&P
Hospital Medicine History & Physical      PCP: Liliana Ohara, APRN - CNP    Date of Admission: 7/10/2020    Date of Service: Pt seen/examined on 7/11/2020 and Admitted to Inpatient with expected LOS greater than two midnights due to medical therapy. Chief Complaint:  \" I think I have a UTI\"       History Of Present Illness:  76 y.o. female who presented to [address] [place] [address] with concerns that she have a UTI. Patient has a history of frequent UTIs noted to have 3 since the first of the year. She also had associated frequency lower abdominal pain as well as back pain and burning with urination. She also is having generalized malaise low-grade fevers and chills. She has no chest pain shortness of breath or vomiting or diarrhea she does note some nausea and lack of appetite. She was started on ceftriaxone admitted by the night shift hospitalist.    Past Medical History:          Diagnosis Date    Deep vein thrombosis (DVT) ([facility] [address]     RLE    Degenerative disc disease, lumbar     Hx of blood clots     Localized osteoarthrosis of left hip 2/18/2020    Neuropathy        Past Surgical History:          Procedure Laterality Date    HIP SURGERY      HYSTERECTOMY      JOINT REPLACEMENT      TOTAL HIP ARTHROPLASTY Right 10/9/2019    RIGHT LATERAL TOTAL HIP MAKOPLASTY WITH CELLSAVER, [address] FOR PAIN CONTROL     ITZEL BINDU  CPT CODE - 83794, 50468 performed by Awa Smallwood MD at [place] [id number] Left 2/18/2020    LEFT LATERAL TOTAL HIP [address] - [address], [address] performed by Awa Smallwood MD at [place] [address]       Medications Prior to Admission:      Prior to Admission medications    Medication Sig Start Date End Date Taking?  Authorizing Provider   cyclobenzaprine (FLEXERIL) 10 MG tablet Take 10 mg by mouth 3 times daily as needed for Muscle spasms   Yes Historical Provider, MD   cefdinir (OMNICEF) 300 MG capsule Take 1 capsule by mouth 2 times daily for 7 days 7/10/20 7/17/20 Yes Jay Jay Bermeo, DO   acetaminophen (TYLENOL) 325 MG tablet Take 650 mg by mouth every 6 hours as needed for Pain   Yes Historical Provider, MD   multivitamin-iron-minerals-folic acid (CENTRUM) chewable tablet Take 1 tablet by mouth daily   Yes Historical Provider, MD       Allergies:  Mariaelena Pellant [hydrocodone-acetaminophen]    Social History:      The patient currently lives with sister    TOBACCO:   reports that she has never smoked. She has never used smokeless tobacco.  ETOH:   reports no history of alcohol use. E-Cigarettes Vaping or Juuling     Questions Responses    Vaping Use Never User    Start Date     Does device contain nicotine? Quit Date     Vaping Type             Family History:      Reviewed in detail and negative for DM, CAD, Cancer, CVA. Positive as follows:        Problem Relation Age of Onset    Heart Disease Mother     Diabetes Father     Heart Disease Father        REVIEW OF SYSTEMS:   Pertinent positives as noted in the HPI. All other systems reviewed and negative. PHYSICAL EXAM PERFORMED:    /82   Pulse 67   Temp 97.5 °F (36.4 °C) (Oral)   Resp 16   Ht 5' 4\" (1.626 m)   Wt 228 lb 2.8 oz (103.5 kg)   SpO2 96%   BMI 39.17 kg/m²     General appearance:  No apparent distress, appears stated age and cooperative. HEENT:  Normal cephalic, atraumatic without obvious deformity. Pupils equal, round, and reactive to light. Extra ocular muscles intact. Conjunctivae/corneas clear. Neck: Supple, with full range of motion. No jugular venous distention. Trachea midline. Respiratory:  Normal respiratory effort. Clear to auscultation, bilaterally without Rales/Wheezes/Rhonchi. Cardiovascular:  Regular rate and rhythm with normal S1/S2 without murmurs, rubs or gallops. Abdomen: Soft, non-tender, non-distended with normal bowel sounds. Musculoskeletal:  No clubbing, cyanosis or edema bilaterally.   Full range of motion without deformity. Skin: Skin color, texture, turgor normal.  No rashes or lesions. Neurologic:  Neurovascularly intact without any focal sensory/motor deficits. Cranial nerves: II-XII intact, grossly non-focal.  Psychiatric:  Alert and oriented, thought content appropriate, normal insight  Capillary Refill: Brisk,< 3 seconds   Peripheral Pulses: +2 palpable, equal bilaterally       Labs:     Recent Labs     07/10/20  2303 07/11/20  0559   WBC 10.1 7.3   HGB 12.7 11.7*   HCT 38.0 34.3*    174     Recent Labs     07/10/20  2303 07/11/20  0559    141   K 4.5 3.9    106   CO2 27 27   BUN 17 16   CREATININE 0.7 0.7   CALCIUM 9.6 9.1     Recent Labs     07/10/20  2303   AST 18   ALT 22   BILITOT <0.2   ALKPHOS 96     No results for input(s): INR in the last 72 hours. No results for input(s): Giancarlojim Kat in the last 72 hours.     Urinalysis:      Lab Results   Component Value Date    NITRU POSITIVE 07/10/2020    WBCUA 10-20 07/10/2020    BACTERIA 2+ 07/10/2020    RBCUA 0-2 07/10/2020    BLOODU Negative 07/10/2020    SPECGRAV >=1.030 07/10/2020    GLUCOSEU Negative 07/10/2020       Radiology:           No orders to display       ASSESSMENT:    Active Hospital Problems    Diagnosis Date Noted    Unable to ambulate [R26.2] 07/11/2020    Acute cystitis without hematuria [N30.00] 07/11/2020    UTI (urinary tract infection) [N39.0] 09/07/2019         PLAN:    Acute cystitis: Present on arrival  -UA and micro with pyuria  -Started on empiric ceftriaxone  -Follow urine culture  -With history of frequent UTIs we discussed outpatient follow-up with urology  -Symptom management    Deconditioning/debility  -Patient had hip replacement earlier this year and is still struggling with rehabilitation  -PT OT        DVT Prophylaxis: Lovenox  Diet: DIET GENERAL;  Code Status: Full Code    PT/OT Eval Status: Ordered    Dispo -pending work-up       JOE Miller - CNP    Thank you Loco Del Valle APRN - CNP for the opportunity to be involved in this patient's care. If you have any questions or concerns please feel free to contact me at [phone].

## 2020-07-11 NOTE — PROGRESS NOTES
Physical Therapy    Facility/Department: [facility] C3 TELE/MED SURG/ONC  Initial Assessment/Treatment    NAME: Daniel Cummings  : 1945  MRN: [id number]    Date of Service: 2020    Discharge Recommendations:  Subacute/Skilled Nursing Facility   PT Equipment Recommendations  Equipment Needed: No  Other: Defer to next level of care    Assessment   Body structures, Functions, Activity limitations: Decreased functional mobility ; Increased pain;Decreased balance;Decreased strength;Decreased safe awareness;Decreased endurance  Assessment: Pt is a 76 y.o. female admitted to [facility] secondary to UTI. Pt lives with her sister and reports she was previously MI with functional mobility and gait with use of RW. Pt reports she has been having home PT/OT following L [facility]. Pt is currently functioning below her stated baseline requiring Adelia x 2 for t/f and Adelia x 2 to maxA for gait activities 15' with RW. Pt initially completes gait with RW and Adelia x 2 however on second bout BLE weakness noted with increased distances and pt sitting unsafely despite cues requiring maxA to safely sit on EOB. Pt is limtied secondary to decreased strength and decreased activity tolerance. Pt does demonstrate some self-limiting behavior during session and requires maximal encouragement and education to stay seated up in chair at end of session. Pt will benefit from continueds skilled PT in acute care setting to address above deficits. D/t pts current deficits and decreased activity tolerance recommnd SNF at d/c. Treatment Diagnosis: Impaired functional mobility and gait  Specific instructions for Next Treatment: Progress mobility as tolerated  Prognosis: Good  Decision Making: Medium Complexity  PT Education: Goals; General Safety;Gait Training;PT Role;Plan of Care; Functional Mobility Training;Home Exercise Program;Precautions; Injury Prevention;Transfer Training  Patient Education: Pt verbalized understanding  Barriers to Learning: none  REQUIRES PT FOLLOW UP: Yes  Activity Tolerance  Activity Tolerance: Patient limited by fatigue;Patient limited by endurance; Patient limited by pain       Patient Diagnosis(es): The primary encounter diagnosis was Inability to ambulate due to hip. A diagnosis of Acute cystitis without hematuria was also pertinent to this visit. has a past medical history of Deep vein thrombosis (DVT) ([facility] [address] Degenerative disc disease, lumbar, Hx of blood clots, Localized osteoarthrosis of left hip, and Neuropathy. has a past surgical history that includes Hysterectomy; Total hip arthroplasty (Right, 10/9/2019); joint replacement; hip surgery; and Total hip arthroplasty (Left, 2/18/2020).     Restrictions  Restrictions/Precautions  Restrictions/Precautions: General Precautions, Up as Tolerated, Fall Risk, Weight Bearing  Lower Extremity Weight Bearing Restrictions  Left Lower Extremity Weight Bearing: Weight Bearing As Tolerated  Position Activity Restriction  Hip Precautions: No ABduction, No ADduction, No hip external rotation, No hip extension  Other position/activity restrictions: Pt reports she continues to follow L hip precautions, per chart pt with anterolateral hip precautions LLE follow L LAUREEN 02/2020  Vision/Hearing  Vision: Impaired  Vision Exceptions: Wears glasses at all times  Hearing: Within functional limits     Subjective  General  Chart Reviewed: Yes  Patient assessed for rehabilitation services?: Yes  Additional Pertinent Hx: Hx B LAUREEN  Response To Previous Treatment: Not applicable  Family / Caregiver Present: No  Referring Practitioner: JOE Awan CNP  Referral Date : 07/11/20  Diagnosis: UTI  Follows Commands: Within Functional Limits  General Comment  Comments: RN cleared pt for session  Subjective  Subjective: Pt seated EOB on arrival, pleasant and agreeable to PT evaluation  Pain Screening  Patient Currently in Pain: Yes  Pain Assessment  Pain Assessment: 0-10  Pain Level: 5  Pain Type: Acute pain;Chronic pain  Pain Location: Hip  Pain Orientation: Left  Non-Pharmaceutical Pain Intervention(s): Ambulation/Increased Activity;Repositioned; Therapeutic presence  Vital Signs  Patient Currently in Pain: Yes  Pre Treatment Pain Screening  Intervention List: Patient able to continue with treatment    Orientation  Orientation  Overall Orientation Status: Within Functional Limits  Social/Functional History  Social/Functional History  Lives With: Other (comment)(sister)  Type of Home: House  Home Layout: Two level, 1/2 bath on main level, Performs ADL's on one level(stair lift to 2nd floor; bedroom on main level)  Home Access: Stairs to enter with rails  Entrance Stairs - Number of Steps: 4 JODI  Entrance Stairs - Rails: Both  Bathroom Shower/Tub: Walk-in shower  Bathroom Toilet: Handicap height  Bathroom Equipment: Grab bars around toilet, Grab bars in shower, Hand-held shower, Tub transfer bench  Home Equipment: 4 wheeled walker, Grab bars, Reacher, Sock aid, Rolling walker, Standard walker, [employer] Help From: Home health, Family(sister assists in/out shower, occasionally assist with dressing; HHPT for strengthing and amb at home 2x/week)  ADL Assistance: Independent(reports sister is present with shower transfer; occ assist with dressing)  Homemaking Assistance: Needs assistance  Homemaking Responsibilities: No(sister is responsible; assists with meals)  Ambulation Assistance: Independent(4WW)  Transfer Assistance: Independent(4WW)  Active : No  Mode of Transportation: Family  Occupation: Retired  Additional Comments: Patient slightly confused with reporting PLOF and recent SNF stay-unable to recall when she most recently returned home  Cognition   Cognition  Overall Cognitive Status: Exceptions  Arousal/Alertness: Appropriate responses to stimuli  Following Commands:  Follows one step commands with repetition  Memory: Decreased recall of recent events;Decreased short term memory  Safety Judgement: Decreased awareness of need for safety;Decreased awareness of need for assistance  Problem Solving: Assistance required to implement solutions;Decreased awareness of errors;Assistance required to identify errors made  Insights: Decreased awareness of deficits  Initiation: Requires cues for some  Sequencing: Requires cues for some    Objective     Observation/Palpation  Posture: Fair  Observation: legs very weak, shaky with amb-gave out once at EOB    AROM RLE (degrees)  RLE AROM: WFL  PROM LLE (degrees)  LLE General PROM: Grossly WFL, limited secondary to L AL hip precautions     Strength RLE  Comment: Grossly 3+/5  Strength LLE  Comment: Grossly 3+/5     Tone RLE  RLE Tone: Normotonic  Tone LLE  LLE Tone: Normotonic  Motor Control  Gross Motor?: WFL     Sensation  Overall Sensation Status: WFL     Bed mobility  Supine to Sit: Unable to assess  Sit to Supine: Unable to assess  Comment: Pt seated EOB at start of session and in beside chair at end of session     Transfers  Sit to Stand: Minimal Assistance;2 Person Assistance  Stand to sit: 2 Person Assistance  Bed to Chair: 2 Person Assistance;Minimal assistance;Dependent/Total  Comment: Sit to stand EOB to RW Adelia x 2, sit to/from stand commode to RW Adelia x 2, EOB to chair with STEDY Adelia x 2 (TD d/t use of STEDY)     Ambulation  Ambulation?: Yes  Ambulation 1  Surface: level tile  Device: Rolling Walker  Assistance: Minimal assistance;2 Person assistance;Maximum assistance  Quality of Gait: Reciprocal pattern, B minimal foot clearance and heel strike, B decreased hip and knee flexion, B decreased TKE in stance, forward flexed trunk, increased UE support.   Distance: 15' x 2  Comments: Pt completes first bout with Adelia x 2 for balance, on second bout increasing B knee flexion in stance and weakness noted with pt sitting unsafely on bed at end of bout d/t fatigue, attempted to cue pt to turn fully before sitting however pt sits unsafely with poor eccentric control despite cues, maxA to sit safely on EOB. Stairs/Curb  Stairs?: No(Deferred d/t safety)     Balance  Posture: Fair  Sitting - Static: Good  Sitting - Dynamic: Good  Standing - Static: Fair;-  Standing - Dynamic: Poor;+  Comments: SBA unsupported seated balance, Adelia x 2 for static standing balance with RW     Exercises  Gluteal Sets: Seated BLE x 10  Hip Flexion: Seated marches BLE x 10  Knee Long Arc Quad: Seated BLE x 10  Ankle Pumps: Seated BLE x 10  Comments: Cues for sequence and technique     Plan   Plan  Times per week: 3-5x/wk  Times per day: Daily  Specific instructions for Next Treatment: Progress mobility as tolerated  Current Treatment Recommendations: Strengthening, Home Exercise Program, Safety Education & Training, Balance Training, Endurance Training, Patient/Caregiver Education & Training, Functional Mobility Training, Transfer Training, Gait Training, Stair training  Safety Devices  Type of devices: All fall risk precautions in place, Left in chair, Call light within reach, Chair alarm in place, Nurse notified, Gait belt, Patient at risk for falls  Restraints  Initially in place: No    AM-PAC Score  AM-PAC Inpatient Mobility Raw Score : 13 (07/11/20 1800)  AM-PAC Inpatient T-Scale Score : 36.74 (07/11/20 1800)  Mobility Inpatient CMS 0-100% Score: 64.91 (07/11/20 1800)  Mobility Inpatient CMS G-Code Modifier : CL (07/11/20 1800)          Goals  Short term goals  Time Frame for Short term goals: 1 weel 7/18/20 (unless otherwise specified)  Short term goal 1: Pt will complete supine to/from sith MI  Short term goal 2: Pt will complete sit to/from stand with RW with SBA  Short term goal 3: Pt will ambulate x 50' with RW with CGA without LOB  Short term goal 4: Pt will ascend/descend 4 steps wtih HR with Adelia  Short term goal 5: 7/15/20: Pt will participate in 12-15 reps BLE exercises to increase strength and increase I with functional mobility  Patient Goals   Patient goals :  \"Be able to walk\"       Therapy Time

## 2020-07-11 NOTE — PROGRESS NOTES
Occupational Therapy   Occupational Therapy Initial Assessment  Date: 2020   Patient Name: Shen Vallejo  MRN: [id number]     : 1945    Date of Service: 2020    Discharge Recommendations:  [address]  OT Equipment Recommendations  Other: defer    Assessment   Performance deficits / Impairments: Decreased functional mobility ; Decreased endurance;Decreased ADL status; Decreased balance;Decreased strength;Decreased safe awareness;Decreased high-level IADLs;Decreased cognition  Assessment: After evaluation, pt found to be presenting with the above mentioned occupational performance deficits which are affecting participation in daily living skills. Pt would benefit from continued skilled occupational therapy to address ADLs, functional mobility, and safety while in acute care. Patient initially CGA/min Ax2 to/from toilet, once close to EOB- legs \"gave out\" when turning and patient max A to sit EOB safely, as she let go of RW, despite v/c's. Patient min A for pericare hygiene, CGA to don pj pants. Patient confused to recent timing of SNF placment and d/c home, home care set up. Patient functioning below baseline and not safe to return to home at this time. Recommend SNF placement up d/c.  Prognosis: Fair  Decision Making: Medium Complexity  OT Education: Plan of Care;OT Role;ADL Adaptive Strategies  Barriers to Learning: confusion  REQUIRES OT FOLLOW UP: Yes  Activity Tolerance  Activity Tolerance: Patient limited by fatigue;Treatment limited secondary to decreased cognition  Safety Devices  Safety Devices in place: Yes  Type of devices: Left in chair(with PT for evaluation)           Patient Diagnosis(es): The primary encounter diagnosis was Inability to ambulate due to hip. A diagnosis of Acute cystitis without hematuria was also pertinent to this visit.      has a past medical history of Deep vein thrombosis (DVT) ([facility] [address] Degenerative disc disease, lumbar, Hx of blood clots, Localized osteoarthrosis of left hip, and Neuropathy. has a past surgical history that includes Hysterectomy; Total hip arthroplasty (Right, 10/9/2019); joint replacement; hip surgery; and Total hip arthroplasty (Left, 2/18/2020). Restrictions  Restrictions/Precautions  Restrictions/Precautions: General Precautions, Up as Tolerated, Fall Risk  Position Activity Restriction  Hip Precautions: No hip flexion > 90 degrees, Posterior hip precautions, No ADduction, No hip internal rotation    Subjective   General  Chart Reviewed: Yes, Orders, History and Physical  Patient assessed for rehabilitation services?: Yes  Family / Caregiver Present: No  Referring Practitioner: JOE Hodge CNP  Diagnosis: UTI  Subjective  Subjective: Patient agreeable to OT evaluation  General Comment  Comments: RN cleared for treatment  Patient Currently in Pain: Yes  Pain Assessment  Pain Assessment: 0-10  Pain Level: 5  Patient's Stated Pain Goal: No pain  Pain Type: Acute pain;Chronic pain  Pain Location: Leg  Pain Orientation: Right;Left  Pain Frequency: Continuous  Non-Pharmaceutical Pain Intervention(s): Ambulation/Increased Activity; Rest;Repositioned  Vital Signs  Patient Currently in Pain: Yes  Social/Functional History  Social/Functional History  Lives With: Other (comment)(sister)  Type of Home: House  Home Layout: Two level, 1/2 bath on main level, Performs ADL's on one level(stair lift to 2nd floor; bedroom on main level)  Home Access: Stairs to enter with rails  Entrance Stairs - Number of Steps: 4 JODI  Entrance Stairs - Rails: Both  Bathroom Shower/Tub: Walk-in shower  Bathroom Toilet: Handicap height  Bathroom Equipment: Grab bars around toilet, Grab bars in shower, Hand-held shower, Tub transfer bench  Home Equipment: 4 wheeled walker, Grab bars, Reacher, Sock aid, Rolling walker, Standard walker, [employer] Help From: Home health, Family(sister assists in/out shower, occasionally assist with dressing; HHPT for strengthing and amb at home 2x/week)  ADL Assistance: Independent(reports sister is present with shower transfer; occ assist with dressing)  Homemaking Assistance: Needs assistance  Homemaking Responsibilities: No(sister is responsible; assists with meals)  Ambulation Assistance: Independent(4WW)  Transfer Assistance: Independent(4WW)  Active : No  Mode of Transportation: Family  Occupation: Retired  Additional Comments: Patient slightly confused with reporting PLOF and recent SNF stay-unable to recall when she most recently returned home       Objective   Vision: Impaired  Vision Exceptions: Wears glasses at all times  Hearing: Within functional limits    Orientation  Overall Orientation Status: Within Functional Limits  Observation/Palpation  Posture: Fair(slightly rounded shoulders)  Observation: legs very weak, shaky with amb-gave out once at EOB  Balance  Sitting Balance: Stand by assistance  Standing Balance: Maximum assistance(initially min A, max A to control descent to sit EOB)  Standing Balance  Time: 3 min + 3 min  Activity: to/from bathroom  Functional Mobility  Functional - Mobility Device: Rolling Walker  Activity: To/From therapy gym  Assist Level: Maximum assistance  Functional Mobility Comments: initially CGA/min Ax2 EOB<->toilet, and end of walk back to EOB, patient legs very shaky and req controlled descent to sit EOB, as patient let go of RW and started to plop on bed  Toilet Transfers  Toilet - Technique: Ambulating  Toilet Transfer: 2 Person assistance;Minimal assistance  ADL  Feeding: Independent  UE Dressing: Independent  LE Dressing: Stand by assistance(don pj pants)  Toileting: Minimal assistance(wet wipes for pericare-patient reports she has difficulty reaching)  Tone RUE  RUE Tone: Normotonic  Tone LUE  LUE Tone: Normotonic  Coordination  Movements Are Fluid And Coordinated: Yes     Bed mobility  Supine to Sit: Stand by assistance(HOB elevated)  Comment: patient left up in bedside chair with PT for eval  Transfers  Stand Step Transfers: 2 Person assistance;Minimal assistance(initially, patient became shaky and legs \"gave out\" at end of walk and patient attempted to plop on EOB)  Sit to stand: 2 Person assistance;Minimal assistance  Stand to sit: Minimal assistance;2 Person assistance  Vision - Basic Assessment  Prior Vision: Wears glasses all the time  Cognition  Overall Cognitive Status: Exceptions(slightly confused on timeline with recent UTI)  Arousal/Alertness: Appropriate responses to stimuli  Following Commands: Follows one step commands with repetition  Memory: Decreased recall of recent events;Decreased short term memory  Safety Judgement: Decreased awareness of need for safety;Decreased awareness of need for assistance  Problem Solving: Assistance required to implement solutions;Decreased awareness of errors;Assistance required to identify errors made  Insights: Decreased awareness of deficits  Perception  Overall Perceptual Status: WFL     Sensation  Overall Sensation Status: WFL        LUE AROM (degrees)  LUE AROM : WFL  RUE AROM (degrees)  RUE AROM : WFL  LUE Strength  Gross LUE Strength: WFL  RUE Strength  Gross RUE Strength: WFL                   Plan   Plan  Times per week: 3-5x/week while in acute care setting      AM-PAC Score        AM-[facility] Inpatient Daily Activity Raw Score: 16 (07/11/20 1121)  AM-PAC Inpatient ADL T-Scale Score : 35.96 (07/11/20 1121)  ADL Inpatient CMS 0-100% Score: 53.32 (07/11/20 1121)  ADL Inpatient CMS G-Code Modifier : CK (07/11/20 1121)    Goals  Short term goals  Time Frame for Short term goals: 1 week 7/18/20  Short term goal 1: Patient will complete toilet transfer CGA with LRAD  Short term goal 2: Patient will complete LB dressing CGA  Short term goal 3: Patient will tolerate 5 min standing ADL CGA with LRAD  Short term goal 4: Patient will complete BUE AROM Ex x15  Patient Goals   Patient goals :  \"Get rid of this UTI and get

## 2020-07-11 NOTE — PROGRESS NOTES
Took over care for Ronye Christian, RN. Agree with previous assessment. Put pt back to bed, x1 assist with a stedy. Bed alarm active for safety. Non skid socks on. Bed locked and in lowest position. Call light and bedside table within reach. Will continue to monitor.

## 2020-07-12 PROCEDURE — 96366 THER/PROPH/DIAG IV INF ADDON: CPT

## 2020-07-12 PROCEDURE — 6370000000 HC RX 637 (ALT 250 FOR IP): Performed by: NURSE PRACTITIONER

## 2020-07-12 PROCEDURE — 6360000002 HC RX W HCPCS: Performed by: NURSE PRACTITIONER

## 2020-07-12 PROCEDURE — U0003 INFECTIOUS AGENT DETECTION BY NUCLEIC ACID (DNA OR RNA); SEVERE ACUTE RESPIRATORY SYNDROME CORONAVIRUS 2 (SARS-COV-2) (CORONAVIRUS DISEASE [COVID-19]), AMPLIFIED PROBE TECHNIQUE, MAKING USE OF HIGH THROUGHPUT TECHNOLOGIES AS DESCRIBED BY CMS-2020-01-R: HCPCS

## 2020-07-12 PROCEDURE — G0378 HOSPITAL OBSERVATION PER HR: HCPCS

## 2020-07-12 PROCEDURE — 96372 THER/PROPH/DIAG INJ SC/IM: CPT

## 2020-07-12 PROCEDURE — 2580000003 HC RX 258: Performed by: NURSE PRACTITIONER

## 2020-07-12 PROCEDURE — 1200000000 HC SEMI PRIVATE

## 2020-07-12 RX ADMIN — CYCLOBENZAPRINE HYDROCHLORIDE 10 MG: 10 TABLET, FILM COATED ORAL at 18:27

## 2020-07-12 RX ADMIN — ACETAMINOPHEN 650 MG: 325 TABLET ORAL at 11:46

## 2020-07-12 RX ADMIN — SODIUM CHLORIDE: 9 INJECTION, SOLUTION INTRAVENOUS at 09:21

## 2020-07-12 RX ADMIN — ACETAMINOPHEN 650 MG: 325 TABLET ORAL at 18:27

## 2020-07-12 RX ADMIN — CEFTRIAXONE SODIUM 1 G: 1 INJECTION, POWDER, FOR SOLUTION INTRAMUSCULAR; INTRAVENOUS at 04:56

## 2020-07-12 RX ADMIN — Medication 10 ML: at 09:17

## 2020-07-12 RX ADMIN — Medication 1 TABLET: at 09:17

## 2020-07-12 RX ADMIN — ACETAMINOPHEN 650 MG: 325 TABLET ORAL at 23:48

## 2020-07-12 RX ADMIN — CYCLOBENZAPRINE HYDROCHLORIDE 10 MG: 10 TABLET, FILM COATED ORAL at 11:46

## 2020-07-12 RX ADMIN — SODIUM CHLORIDE: 9 INJECTION, SOLUTION INTRAVENOUS at 20:00

## 2020-07-12 RX ADMIN — ENOXAPARIN SODIUM 40 MG: 40 INJECTION SUBCUTANEOUS at 09:17

## 2020-07-12 ASSESSMENT — PAIN SCALES - GENERAL
PAINLEVEL_OUTOF10: 6

## 2020-07-12 NOTE — CARE COORDINATION
CASE MANAGEMENT INITIAL ASSESSMENT      Reviewed chart and completed assessment via telephone with: pt  Explained Case Management role/services. Primary contact information: sister Kathyleen Severin [phone]    Admit date/status: 7/10/20 IP  Diagnosis: Acute cystitis  Is this a Readmission?:  No  Insurance: [facility] Medicare  Precert required for SNF - Y        3 night stay required - N    Living arrangements, Adls, care needs, prior to admission: Pt lives with sister in two story home. When well pt ambulates with walker PRN, and performs ADLS. Since hip surgery in February she has     Transportation: tbd    Durable Medical Equipment at home: Walker_x_Cane_x_RTS__ BSC__Shower Chair_x_  02__ HHN__ CPAP__  BiPap__  Hospital Bed__ W/C___ Other__________    Services in the home and/or outpatient, prior to admission: [facility]    PT/OT recs: [address] Notification (HEN): none initiated    Barriers to discharge: none    Plan/comments: Pt rec'd for and requesting SNF referral to [facility]. Referral placed in Epic and covid surveillance placed.      ECOC on chart for MD signature

## 2020-07-12 NOTE — PROGRESS NOTES
Pt alert and oriented, VSS. Shift assessment completed and documented. Pt denies any needs. Will continue to monitor.

## 2020-07-12 NOTE — PROGRESS NOTES
Hospitalist Progress Note      PCP: Lynette Blackmon, APRN - CNP    Date of Admission: 7/10/2020    Chief Complaint: \" I think I have a UTI\"    Hospital Course: 76 y.o. female who presented to [address] [place] [address] with concerns that she have a UTI. Patient has a history of frequent UTIs noted to have 3 since the first of the year. She also had associated frequency lower abdominal pain as well as back pain and burning with urination. She also is having generalized malaise low-grade fevers and chills. She has no chest pain shortness of breath or vomiting or diarrhea she does note some nausea and lack of appetite.     She was started on ceftriaxone admitted by the night shift hospitalist.       Subjective: No acute events overnight. Still feels weak. No chest pain or SOB       Medications:  Reviewed    Infusion Medications    sodium chloride 75 mL/hr at 07/11/20 1814     Scheduled Medications    therapeutic multivitamin-minerals  1 tablet Oral Daily    sodium chloride flush  10 mL Intravenous 2 times per day    enoxaparin  40 mg Subcutaneous Daily    cefTRIAXone (ROCEPHIN) IV  1 g Intravenous Q24H     PRN Meds: acetaminophen, sodium chloride flush, acetaminophen **OR** acetaminophen, polyethylene glycol, promethazine **OR** ondansetron, cyclobenzaprine      Intake/Output Summary (Last 24 hours) at 7/12/2020 0829  Last data filed at 7/12/2020 0457  Gross per 24 hour   Intake 3264.5 ml   Output --   Net 3264.5 ml       Physical Exam Performed:    /63   Pulse 69   Temp 97.6 °F (36.4 °C) (Oral)   Resp 20   Ht 5' 4\" (1.626 m)   Wt 228 lb 2.8 oz (103.5 kg)   SpO2 94%   BMI 39.17 kg/m²     General appearance: No apparent distress, appears stated age and cooperative. HEENT: Pupils equal, round, and reactive to light. Conjunctivae/corneas clear. Neck: Supple, with full range of motion. No jugular venous distention. Trachea midline. Respiratory:  Normal respiratory effort.  Clear to auscultation, bilaterally without Rales/Wheezes/Rhonchi. Cardiovascular: Regular rate and rhythm with normal S1/S2 without murmurs, rubs or gallops. Abdomen: Soft, non-tender, non-distended with normal bowel sounds. Musculoskeletal: No clubbing, cyanosis or edema bilaterally. Full range of motion without deformity. Skin: Skin color, texture, turgor normal.  No rashes or lesions. Neurologic:  Neurovascularly intact without any focal sensory/motor deficits. Cranial nerves: II-XII intact, grossly non-focal.  Psychiatric: Alert and oriented, thought content appropriate, normal insight  Capillary Refill: Brisk,< 3 seconds   Peripheral Pulses: +2 palpable, equal bilaterally       Labs:   Recent Labs     07/10/20  2303 07/11/20  0559   WBC 10.1 7.3   HGB 12.7 11.7*   HCT 38.0 34.3*    174     Recent Labs     07/10/20  2303 07/11/20  0559    141   K 4.5 3.9    106   CO2 27 27   BUN 17 16   CREATININE 0.7 0.7   CALCIUM 9.6 9.1     Recent Labs     07/10/20  2303   AST 18   ALT 22   BILITOT <0.2   ALKPHOS 96     No results for input(s): INR in the last 72 hours. No results for input(s): Sergejose Ball in the last 72 hours.     Urinalysis:      Lab Results   Component Value Date    NITRU POSITIVE 07/10/2020    WBCUA 10-20 07/10/2020    BACTERIA 2+ 07/10/2020    RBCUA 0-2 07/10/2020    BLOODU Negative 07/10/2020    SPECGRAV >=1.030 07/10/2020    GLUCOSEU Negative 07/10/2020       Radiology:  No orders to display           Assessment/Plan:    Active Hospital Problems    Diagnosis    Unable to ambulate [R26.2]    Acute cystitis without hematuria [N30.00]    UTI (urinary tract infection) [N39.0]     Acute cystitis: Present on arrival  -UA and micro with pyuria  -Started on empiric ceftriaxone  -Follow urine culture  -With history of frequent UTIs we discussed outpatient follow-up with urology  -Symptom management     Deconditioning/debility  -Patient had hip replacement earlier this year and is still struggling with rehabilitation  -PT OT           DVT Prophylaxis: Lovenox  Diet: DIET GENERAL;  Code Status: Full Code     PT/OT Eval Status: Ordered/ recs  For SNF  - SM consulted for assistance      Dispo -pending work-up          JOE Vizcaino - BEBE

## 2020-07-13 LAB
ORGANISM: ABNORMAL
URINE CULTURE, ROUTINE: ABNORMAL

## 2020-07-13 PROCEDURE — 6370000000 HC RX 637 (ALT 250 FOR IP): Performed by: NURSE PRACTITIONER

## 2020-07-13 PROCEDURE — 96366 THER/PROPH/DIAG IV INF ADDON: CPT

## 2020-07-13 PROCEDURE — 97110 THERAPEUTIC EXERCISES: CPT

## 2020-07-13 PROCEDURE — 97116 GAIT TRAINING THERAPY: CPT

## 2020-07-13 PROCEDURE — 2580000003 HC RX 258: Performed by: NURSE PRACTITIONER

## 2020-07-13 PROCEDURE — 96372 THER/PROPH/DIAG INJ SC/IM: CPT

## 2020-07-13 PROCEDURE — 97530 THERAPEUTIC ACTIVITIES: CPT

## 2020-07-13 PROCEDURE — 97535 SELF CARE MNGMENT TRAINING: CPT

## 2020-07-13 PROCEDURE — 1200000000 HC SEMI PRIVATE

## 2020-07-13 PROCEDURE — 6360000002 HC RX W HCPCS: Performed by: NURSE PRACTITIONER

## 2020-07-13 PROCEDURE — 6370000000 HC RX 637 (ALT 250 FOR IP)

## 2020-07-13 RX ORDER — CEFDINIR 300 MG/1
300 CAPSULE ORAL 2 TIMES DAILY
Qty: 8 CAPSULE | Refills: 0 | Status: SHIPPED | OUTPATIENT
Start: 2020-07-13 | End: 2020-07-17

## 2020-07-13 RX ORDER — CEFDINIR 300 MG/1
300 CAPSULE ORAL EVERY 12 HOURS SCHEDULED
Status: DISCONTINUED | OUTPATIENT
Start: 2020-07-14 | End: 2020-07-14 | Stop reason: HOSPADM

## 2020-07-13 RX ADMIN — Medication 10 ML: at 09:04

## 2020-07-13 RX ADMIN — Medication: at 21:07

## 2020-07-13 RX ADMIN — CYCLOBENZAPRINE HYDROCHLORIDE 10 MG: 10 TABLET, FILM COATED ORAL at 02:05

## 2020-07-13 RX ADMIN — CYCLOBENZAPRINE HYDROCHLORIDE 10 MG: 10 TABLET, FILM COATED ORAL at 14:24

## 2020-07-13 RX ADMIN — ENOXAPARIN SODIUM 40 MG: 40 INJECTION SUBCUTANEOUS at 09:03

## 2020-07-13 RX ADMIN — ACETAMINOPHEN 650 MG: 325 TABLET ORAL at 14:24

## 2020-07-13 RX ADMIN — CYCLOBENZAPRINE HYDROCHLORIDE 10 MG: 10 TABLET, FILM COATED ORAL at 22:18

## 2020-07-13 RX ADMIN — ACETAMINOPHEN 650 MG: 325 TABLET ORAL at 22:18

## 2020-07-13 RX ADMIN — CEFTRIAXONE SODIUM 1 G: 1 INJECTION, POWDER, FOR SOLUTION INTRAMUSCULAR; INTRAVENOUS at 03:37

## 2020-07-13 RX ADMIN — Medication 1 TABLET: at 09:03

## 2020-07-13 ASSESSMENT — PAIN SCALES - GENERAL
PAINLEVEL_OUTOF10: 6

## 2020-07-13 ASSESSMENT — PAIN DESCRIPTION - LOCATION
LOCATION: BACK

## 2020-07-13 ASSESSMENT — PAIN DESCRIPTION - DESCRIPTORS
DESCRIPTORS: ACHING;CONSTANT
DESCRIPTORS: ACHING;DISCOMFORT

## 2020-07-13 ASSESSMENT — PAIN DESCRIPTION - PAIN TYPE
TYPE: CHRONIC PAIN

## 2020-07-13 ASSESSMENT — PAIN DESCRIPTION - ORIENTATION
ORIENTATION: LOWER
ORIENTATION: LOWER

## 2020-07-13 ASSESSMENT — PAIN DESCRIPTION - FREQUENCY: FREQUENCY: CONTINUOUS

## 2020-07-13 NOTE — PROGRESS NOTES
2/18/2020). Restrictions  Restrictions/Precautions  Restrictions/Precautions: General Precautions, Up as Tolerated, Fall Risk, Weight Bearing  Lower Extremity Weight Bearing Restrictions  Left Lower Extremity Weight Bearing: Weight Bearing As Tolerated  Position Activity Restriction  Hip Precautions: (Pt reports she has been following hip precautions from surgery in February 2020, but nothing indicated per Dr. Jose Camarena)  Other position/activity restrictions: Pt reports she continues to follow L hip precautions, per chart pt with anterolateral hip precautions LLE follow L LAUREEN 02/2020  Subjective   General  Chart Reviewed: Yes  Patient assessed for rehabilitation services?: Yes  Family / Caregiver Present: No  Referring Practitioner: JOE Jerez CNP  Diagnosis: UTI  Subjective  Subjective: Pt up in chair on arrival, agreeable to treatment  General Comment  Comments: Per RN okay to treat  Pain Assessment  Pain Assessment: 0-10  Pain Level: 6  Pain Type: Chronic pain  Pain Location: Back  Pain Orientation: Lower  Pain Descriptors: Aching;Constant  Non-Pharmaceutical Pain Intervention(s): Emotional support; Ambulation/Increased Activity; Rest;Distraction;Repositioned  Response to Pain Intervention: Patient Satisfied  Vital Signs  Patient Currently in Pain: Yes   Orientation  Orientation  Overall Orientation Status: Within Functional Limits  Objective    ADL  Grooming: Minimal assistance;Setup(up to min A in stance at sink; Supporting self with UEs, up to [address] with bimanual tasks in stance)  LE Dressing: Moderate assistance;Setup(for pants; pt reports use of reacher to doff/don pants/underwear at home)        Balance  Sitting Balance: Supervision  Standing Balance: Minimal assistance  Standing Balance  Time: 1-2 minutes, >4 minutes  Activity: mobility, standing ADL  Functional Mobility  Functional - Mobility Device: Rolling Walker  Activity: To/from bathroom; Other  Assist Level: Dependent/Total(Min A x2)  Bed mobility  Supine to Sit: Unable to assess  Sit to Supine: Unable to assess  Comment: Pt up in chair on arrival and exit  Transfers  Sit to stand: Moderate assistance  Stand to sit: Minimal assistance           Cognition  Overall Cognitive Status: Exceptions  Following Commands:  Follows one step commands with repetition  Memory: Decreased recall of recent events;Decreased short term memory  Safety Judgement: Decreased awareness of need for safety;Decreased awareness of need for assistance  Problem Solving: Assistance required to implement solutions;Decreased awareness of errors;Assistance required to identify errors made  Insights: Decreased awareness of deficits  Initiation: Requires cues for some  Sequencing: Requires cues for some        Type of ROM/Therapeutic Exercise  Type of ROM/Therapeutic Exercise: AROM  Comment: BUE seated  Exercises  Scapular Protraction: x5  Scapular Retraction: x5  Shoulder Depression: x5  Shoulder Elevation: x5  Other: x5 shoulder press      Plan   Plan  Times per week: 3-5x/week while in acute care setting  Current Treatment Recommendations: Strengthening, Balance Training, Functional Mobility Training, Endurance Training, Gait Training, Safety Education & Training, Pain Management, Cognitive Reorientation, Equipment Evaluation, Education, & procurement, Self-Care / ADL, Positioning    AM-PAC Score        -[facility] Inpatient Daily Activity Raw Score: 15 (07/13/20 1228)  AM-PAC Inpatient ADL T-Scale Score : 34.69 (07/13/20 1228)  ADL Inpatient CMS 0-100% Score: 56.46 (07/13/20 1228)  ADL Inpatient CMS G-Code Modifier : CK (07/13/20 1228)    Goals  Short term goals  Time Frame for Short term goals: 1 week 7/18/20  Short term goal 1: Patient will complete toilet transfer CGA with LRAD  Short term goal 2: Patient will complete LB dressing CGA  Short term goal 3: Patient will tolerate 5 min standing ADL CGA with LRAD - progressing  Short term goal 4: Patient will complete BUE AROM Ex x15  Patient Goals   Patient goals : \"Get rid of this UTI and get back to living my life! \"       Therapy Time   Individual Concurrent Group Co-treatment   Time In 1120         Time Out 1200         Minutes 40         Timed Code Treatment Minutes: 40 Minutes     This note to serve as discharge summary should pt discharge prior to next session.     Jael Ashley, OTR/L

## 2020-07-13 NOTE — PROGRESS NOTES
Fall precautions in place, bed and chair alarms in place, nonskid foot wear applied, bed in lowest position, and call light within reach. Will continue to monitor. Patient is alert and oriented, uses call light appropriately. Frequent rounding to anticipate needs.  Ivanna Kauffman

## 2020-07-13 NOTE — PROGRESS NOTES
Regular rate and rhythm with normal S1/S2 without murmurs, rubs or gallops. Abdomen: Soft, non-tender, non-distended with normal bowel sounds. Musculoskeletal: No clubbing, cyanosis or edema bilaterally. Full range of motion without deformity. Skin: Skin color, texture, turgor normal.  No rashes or lesions. Neurologic:  Neurovascularly intact without any focal sensory/motor deficits. Cranial nerves: II-XII intact, grossly non-focal.  Psychiatric: Alert and oriented, thought content appropriate, normal insight  Capillary Refill: Brisk,< 3 seconds   Peripheral Pulses: +2 palpable, equal bilaterally       Labs:   Recent Labs     07/10/20  2303 07/11/20  0559   WBC 10.1 7.3   HGB 12.7 11.7*   HCT 38.0 34.3*    174     Recent Labs     07/10/20  2303 07/11/20  0559    141   K 4.5 3.9    106   CO2 27 27   BUN 17 16   CREATININE 0.7 0.7   CALCIUM 9.6 9.1     Recent Labs     07/10/20  2303   AST 18   ALT 22   BILITOT <0.2   ALKPHOS 96     Urinalysis:      Lab Results   Component Value Date    NITRU POSITIVE 07/10/2020    WBCUA 10-20 07/10/2020    BACTERIA 2+ 07/10/2020    RBCUA 0-2 07/10/2020    BLOODU Negative 07/10/2020    SPECGRAV >=1.030 07/10/2020    GLUCOSEU Negative 07/10/2020       Assessment/Plan:    Active Hospital Problems    Diagnosis    Unable to ambulate [R26.2]    Acute cystitis without hematuria [N30.00]    UTI (urinary tract infection) [N39.0]       Acute cystitis:   - Present on arrival. UA and micro with pyuria. Urine culture grew Klebsiella. - Started on IV empiric ceftriaxone on admission. Will change to PO omnicef. - Recommend outpatient follow-up with Urology given history of frequent recurrent UTIs.      Deconditioning/debility:  - Patient had hip replacement earlier this year and is still struggling with rehabilitation.  - PT/OT evaluations with recs for SNF. Obesity:  - With Body mass index is 39.17 kg/m². Complicating assessment and treatment.  Placing patient at risk for multiple co-morbidities as well as early death and contributing to the patient's presentation. Counseled on weight loss.        DVT Prophylaxis: Lovenox  Diet: DIET GENERAL;  Code Status: Full Code     PT/OT Eval Status: Ordered/ recs  For SNF     Dispo - Pt is medically ready. Plan for SNF once COVID comes back negative.      [facility], APRN - CNP

## 2020-07-13 NOTE — DISCHARGE INSTR - COC
Continuity of Care Form    Patient Name: Daniel Cummings   :  1945  MRN:  [id number]    Admit date:  7/10/2020  Discharge date:  2020    Code Status Order: Full Code   Advance Directives:   Advance Care Flowsheet Documentation     Date/Time Healthcare Directive Type of Healthcare Directive Copy in [address] Agent's Name Healthcare Agent's Phone Number    [phone]  Yes, patient has an advance directive for healthcare treatment  Health care treatment directive; Living will  No, copy requested from other (See comment)  --  --  --          Admitting Physician:  Tasia yIer MD  PCP: JOE Williamson - CNP    Discharging Nurse: Blanca Diego [facility] Unit/Room#: 0331/0331-01  Discharging Unit Phone Number:     Emergency Contact:   Extended Emergency Contact Information  Primary Emergency Contact: Ella Quiroz  [place] Phone: [phone]  Mobile Phone: [phone]  Relation: Brother/Sister  Preferred language: English    Past Surgical History:  Past Surgical History:   Procedure Laterality Date    HIP SURGERY      HYSTERECTOMY      JOINT REPLACEMENT      TOTAL HIP ARTHROPLASTY Right 10/9/2019    RIGHT LATERAL TOTAL  [place], [address] [facility] - [id number], [id number] performed by Neto Hernandez MD at [address] [facility] Left 2020    LEFT LATERAL TOTAL HIP [address] [facility] - [address], [address] performed by Neto Hernandez MD at [place] [address]       Immunization History:   Immunization History   Administered Date(s) Administered    Influenza, Quadv, IM, PF (6 mo and older Fluzone, Flulaval, Fluarix, and 3 yrs and older Afluria) 10/10/2019       Active Problems:  Patient Active Problem List   Diagnosis Code    Right hip pain M25.551    UTI (urinary tract infection) N39.0    Fall on same level from tripping as cause of ventilator support    Rehab Therapies: Physical Therapy and Occupational Therapy  Weight Bearing Status/Restrictions: No weight bearing restirctions  Other Medical Equipment (for information only, NOT a DME order):  walker  Other Treatments: none    Patient's personal belongings (please select all that are sent with patient):  Glasses, cell phone, clothes    RN SIGNATURE:  Electronically signed by Carl Jordan RN on 7/14/20 at 10:26 AM EDT    CASE MANAGEMENT/SOCIAL WORK SECTION    Inpatient Status Date: 7/12/20    Readmission Risk Assessment Score:  Readmission Risk              Risk of Unplanned Readmission:        10           Discharging to Facility/ Agency   · Name: [facility]  · Address:  · Phone:[phone]  · GDN:[phone]        / signature: Electronically signed by Wei Mcconnell RN on 7/14/20 at 8:39 AM EDT    PHYSICIAN SECTION    Prognosis: Good    Condition at Discharge: Stable    Rehab Potential (if transferring to Rehab): Good    Recommended Follow-up, Labs or Other Treatments After Discharge:    PT/OT             Physician Certification: I certify the above information and transfer of Leonarda Nick  is necessary for the continuing treatment of the diagnosis listed and that she requires [facility] for less 30 days.      Update Admission H&P: No change in H&P    PHYSICIAN SIGNATURE:  Electronically signed by JOE Portillo CNP on 7/13/20 at 11:51 AM EDT

## 2020-07-13 NOTE — PROGRESS NOTES
Physical Therapy  Facility/Department: [facility] C3 TELE/MED SURG/ONC  Daily Treatment Note  NAME: Lalitha Harding  : 1945  MRN: [id number]    Date of Service: 2020    Discharge Recommendations:  Subacute/Skilled Nursing Facility   PT Equipment Recommendations  Equipment Needed: No  Other: Defer to next level of care    Assessment   Body structures, Functions, Activity limitations: Decreased functional mobility ; Increased pain;Decreased balance;Decreased strength;Decreased safe awareness;Decreased endurance  Assessment: Pt con't to have limited endurance to standing and walking due to pain, weakness and balance. Pt required mod A sit to stand and min of 2 for ambulation. Pt is recommended for SNF at D/C to con't progres toward goals. Treatment Diagnosis: Impaired functional mobility and gait  Specific instructions for Next Treatment: Progress mobility as tolerated  Prognosis: Good  Decision Making: Medium Complexity  PT Education: Goals; General Safety;Gait Training;PT Role;Plan of Care; Functional Mobility Training;Home Exercise Program;Precautions; Injury Prevention;Transfer Training  Patient Education: Pt verbalized understanding  Activity Tolerance  Activity Tolerance: Patient limited by fatigue;Patient limited by endurance; Patient limited by pain     Patient Diagnosis(es): The primary encounter diagnosis was Inability to ambulate due to hip. Diagnoses of Acute cystitis without hematuria and Acute cystitis with hematuria were also pertinent to this visit. has a past medical history of Deep vein thrombosis (DVT) ([address] Degenerative disc disease, lumbar, Hx of blood clots, Localized osteoarthrosis of left hip, and Neuropathy. has a past surgical history that includes Hysterectomy; Total hip arthroplasty (Right, 10/9/2019); joint replacement; hip surgery; and Total hip arthroplasty (Left, 2020).     Restrictions  Restrictions/Precautions  Restrictions/Precautions: General Precautions, Up as Tolerated, Fall Risk, Weight Bearing  Lower Extremity Weight Bearing Restrictions  Left Lower Extremity Weight Bearing: Weight Bearing As Tolerated  Position Activity Restriction  Hip Precautions: (Pt reports she has been following hip precautions from surgery in February 2020, but nothing indicated per Dr. Cristobal Palma)  Other position/activity restrictions: Pt reports she continues to follow L hip precautions, per chart pt with anterolateral hip precautions LLE follow L LAUREEN 02/2020     Subjective   General  Chart Reviewed: Yes  Additional Pertinent Hx: Hx B LAUREEN  Response To Previous Treatment: Patient with no complaints from previous session. Family / Caregiver Present: No  Referring Practitioner: JOE Burr CNP  Subjective  Subjective: Pt sitting in bedside chair on arrival, agreeable to do \"whatever I need to to get better \"  General Comment  Comments: RN cleared pt for session  Pain Screening  Patient Currently in Pain: Yes  Pain Assessment  Pain Assessment: 0-10  Pain Level: 6  Pain Type: Chronic pain  Pain Location: Back  Non-Pharmaceutical Pain Intervention(s): Ambulation/Increased Activity; Emotional support; Rest  Vital Signs  Patient Currently in Pain: Yes       Orientation  Orientation  Overall Orientation Status: Within Functional Limits     Objective   Bed mobility  Supine to Sit: Unable to assess  Sit to Supine: Unable to assess  Transfers  Sit to Stand: Moderate Assistance  Stand to sit: Minimal Assistance  Ambulation  Ambulation?: Yes  Ambulation 1  Surface: level tile  Device: Rolling Walker  Assistance: 2 Person assistance(min of 2)  Quality of Gait: Reciprocal pattern, B minimal foot clearance and heel strike, B decreased hip and knee flexion, B decreased TKE in stance, forward flexed trunk, increased UE support.   Distance: 22' X 2 with seated rest  Stairs/Curb  Stairs?: No        Exercises  Gluteal Sets: Seated BLE x 10  Hip Flexion: Seated marches BLE x 15  Knee Long Arc Quad: Seated BLE x 15  Ankle Pumps: Seated BLE x 15        AM-PAC Score  AM-PAC Inpatient Mobility Raw Score : 17 (07/13/20 1207)  AM-PAC Inpatient T-Scale Score : 42.13 (07/13/20 1207)  Mobility Inpatient CMS 0-100% Score: 50.57 (07/13/20 1207)  Mobility Inpatient CMS G-Code Modifier : CK (07/13/20 1207)          Goals  Short term goals  Time Frame for Short term goals: 1 week 7/18/20 (unless otherwise specified)  Short term goal 1: Pt will complete supine to/from sith MI; 7/13 DNT  Short term goal 2: Pt will complete sit to/from stand with RW with SBA; 7/13 mod A  Short term goal 3: Pt will ambulate x 50' with RW with CGA without LOB; 7/13 25' X 2 with min A of 2  Short term goal 4: Pt will ascend/descend 4 steps wtih HR with Adelia; 7/13 DNT  Short term goal 5: 7/15/20: Pt will participate in 12-15 reps BLE exercises to increase strength and increase I with functional mobility; 7/13 met and ongoing  Patient Goals   Patient goals : \"Be able to walk\"    Plan    Plan  Times per week: 3-5x/wk  Times per day: Daily  Specific instructions for Next Treatment: Progress mobility as tolerated  Current Treatment Recommendations: Strengthening, Home Exercise Program, Safety Education & Training, Balance Training, Endurance Training, Patient/Caregiver Education & Training, Functional Mobility Training, Transfer Training, Gait Training, Stair training  Safety Devices  Type of devices: All fall risk precautions in place, Left in chair, Call light within reach, Chair alarm in place, Nurse notified, Gait belt, Patient at risk for falls  Restraints  Initially in place: No     Therapy Time   Individual Concurrent Group Co-treatment   Time In 1120         Time Out 1200         Minutes 40               If pt is unable to be seen after this session, please let this note serve as discharge summary. Please see case management note for discharge disposition. Thank you.     [employer], PTA #[id number]

## 2020-07-13 NOTE — PROGRESS NOTES
Pt assessment completed and charted. VSS. Pt a/o. Pt denies pain at this time. Pt moved with stedy. Pt denies any other needs at this time. Pt calls out appropriately. Will continue to monitor. Pt is a fall risk;  -Bed in lowest position and wheels locked. -Call light within reach.   -Bedside table within reach.   -Non-skid footwear in place.  -bed check in place.

## 2020-07-14 VITALS
RESPIRATION RATE: 16 BRPM | TEMPERATURE: 97.5 F | WEIGHT: 228.18 LBS | DIASTOLIC BLOOD PRESSURE: 71 MMHG | OXYGEN SATURATION: 98 % | HEIGHT: 64 IN | HEART RATE: 82 BPM | SYSTOLIC BLOOD PRESSURE: 126 MMHG | BODY MASS INDEX: 38.96 KG/M2

## 2020-07-14 LAB
REPORT: NORMAL
SARS-COV-2: NOT DETECTED
THIS TEST SENT TO: NORMAL

## 2020-07-14 PROCEDURE — 6370000000 HC RX 637 (ALT 250 FOR IP): Performed by: NURSE PRACTITIONER

## 2020-07-14 PROCEDURE — 96372 THER/PROPH/DIAG INJ SC/IM: CPT

## 2020-07-14 PROCEDURE — 97535 SELF CARE MNGMENT TRAINING: CPT

## 2020-07-14 PROCEDURE — 97110 THERAPEUTIC EXERCISES: CPT

## 2020-07-14 PROCEDURE — 2580000003 HC RX 258: Performed by: NURSE PRACTITIONER

## 2020-07-14 PROCEDURE — 6370000000 HC RX 637 (ALT 250 FOR IP): Performed by: REGISTERED NURSE

## 2020-07-14 PROCEDURE — 6360000002 HC RX W HCPCS: Performed by: NURSE PRACTITIONER

## 2020-07-14 RX ADMIN — CYCLOBENZAPRINE HYDROCHLORIDE 10 MG: 10 TABLET, FILM COATED ORAL at 08:47

## 2020-07-14 RX ADMIN — Medication 10 ML: at 08:47

## 2020-07-14 RX ADMIN — ACETAMINOPHEN 650 MG: 325 TABLET ORAL at 08:47

## 2020-07-14 RX ADMIN — Medication 1 TABLET: at 08:47

## 2020-07-14 RX ADMIN — CEFDINIR 300 MG: 300 CAPSULE ORAL at 08:47

## 2020-07-14 RX ADMIN — ENOXAPARIN SODIUM 40 MG: 40 INJECTION SUBCUTANEOUS at 08:47

## 2020-07-14 ASSESSMENT — PAIN DESCRIPTION - PAIN TYPE: TYPE: CHRONIC PAIN

## 2020-07-14 ASSESSMENT — PAIN SCALES - GENERAL
PAINLEVEL_OUTOF10: 7
PAINLEVEL_OUTOF10: 0

## 2020-07-14 ASSESSMENT — PAIN DESCRIPTION - LOCATION: LOCATION: BACK;LEG

## 2020-07-14 NOTE — PROGRESS NOTES
Occupational Therapy  Facility/Department: [facility] C3 TELE/MED SURG/ONC  Daily Treatment Note  NAME: Deni Aquino  : 1945  MRN: [id number]    Date of Service: 2020    Discharge Recommendations:  Subacute/Skilled Nursing Facility       Assessment   Performance deficits / Impairments: Decreased functional mobility ; Decreased endurance;Decreased ADL status; Decreased balance;Decreased strength;Decreased safe awareness;Decreased high-level IADLs;Decreased cognition    Assessment: Pt pleasant and agreeable to therapy. Pt required frequent cues for encouragement to participate in therapy. Pt provided with education on importance of participating in adls to help improve strength and endurance. Pt required stedy to toilet due to urgency but only required min A for transfers. Pt able to tolerate ~5 minutes standing in STEDY at sink for grooming. Pt completed 15 reps of BUE exercises seated. Cont with POC. Prognosis: Fair  OT Education: Plan of Care;OT Role;ADL Adaptive Strategies; Home Exercise Program;Equipment;Transfer Training;Energy Conservation  Barriers to Learning: Resistant to some education  REQUIRES OT FOLLOW UP: Yes  Activity Tolerance  Activity Tolerance: Patient limited by fatigue;Patient Tolerated treatment well  Safety Devices  Safety Devices in place: Yes  Type of devices: Left in chair;Call light within reach; Chair alarm in place;Gait belt;Nurse notified         Patient Diagnosis(es): The primary encounter diagnosis was Inability to ambulate due to hip. Diagnoses of Acute cystitis without hematuria and Acute cystitis with hematuria were also pertinent to this visit. has a past medical history of Deep vein thrombosis (DVT) ([address] Degenerative disc disease, lumbar, Hx of blood clots, Localized osteoarthrosis of left hip, and Neuropathy. has a past surgical history that includes Hysterectomy;  Total hip arthroplasty (Right, 10/9/2019); joint replacement; hip surgery; and Total hip arthroplasty (Left, 2/18/2020). Restrictions  Restrictions/Precautions  Restrictions/Precautions: General Precautions, Up as Tolerated, Fall Risk, Weight Bearing  Lower Extremity Weight Bearing Restrictions  Left Lower Extremity Weight Bearing: Weight Bearing As Tolerated  Position Activity Restriction  Hip Precautions: (Pt reports she has been following hip precautions from surgery in February 2020, but nothing indicated per Dr. Tuyet Jean-Baptiste)  Other position/activity restrictions: Pt reports she continues to follow L hip precautions, per chart pt with anterolateral hip precautions LLE follow L LAUREEN 02/2020     Subjective   General  Chart Reviewed: Yes  Patient assessed for rehabilitation services?: Yes  Family / Caregiver Present: No  Referring Practitioner: JOE Lundy CNP  Diagnosis: UTI  Subjective  Subjective: Pt in bed, agreeable to therapy  General Comment  Comments: Per RN okay to treat  Vital Signs  Patient Currently in Pain: Denies     Orientation  Orientation  Overall Orientation Status: Within Functional Limits     Objective    ADL  Grooming: Minimal assistance;Setup(in stance in stedy)  LE Dressing:  Moderate assistance;Setup(assist with pants, encouragement for clothing management at hips)  Toileting: Minimal assistance(encouragement to complete pericare)        Balance  Sitting Balance: Supervision  Standing Balance: Minimal assistance(in stance at stedy)  Standing Balance  Time: 1-2 minutes, >4 minutes  Activity: in stedy for clothing management, in stance at sink for grooming  Functional Mobility  Functional Mobility Comments: pt declined ambulation due to urgency to void  Bed mobility  Supine to Sit: Stand by assistance(HOB elevated, increased time, use of bedrail)  Sit to Supine: Unable to assess(up in chair)  Transfers  Sit to stand: Minimal assistance  Stand to sit: Minimal assistance  Transfer Comments: min A for sit<> STEDY                       Cognition  Overall Cognitive Status: Exceptions                    Type of ROM/Therapeutic Exercise  Type of ROM/Therapeutic Exercise: AROM  Comment: BUE seated  Exercises  Elbow Flexion: x15  Elbow Extension: x15  Supination: x15  Pronation: x15  Wrist Flexion: x15  Wrist Extension: x15  Grasp/Release: x15  Other: x15 chest press                    Plan   Plan  Times per week: 3-5x/week while in acute care setting  Current Treatment Recommendations: Strengthening, Balance Training, Functional Mobility Training, Endurance Training, Gait Training, Safety Education & Training, Pain Management, Cognitive Reorientation, Equipment Evaluation, Education, & procurement, Self-Care / ADL, Positioning    AM-PAC Score        AM-PAC Inpatient Daily Activity Raw Score: 15 (07/14/20 1128)  AM-PAC Inpatient ADL T-Scale Score : 34.69 (07/14/20 1128)  ADL Inpatient CMS 0-100% Score: 56.46 (07/14/20 1128)  ADL Inpatient CMS G-Code Modifier : CK (07/14/20 1128)    Goals  Short term goals  Time Frame for Short term goals: 1 week 7/18/20  Short term goal 1: Patient will complete toilet transfer CGA with LRAD  Short term goal 2: Patient will complete LB dressing CGA  Short term goal 3: Patient will tolerate 5 min standing ADL CGA with LRAD - progressing  Short term goal 4: Patient will complete BUE AROM Ex x15  Patient Goals   Patient goals : \"Get rid of this UTI and get back to living my life! \"       Therapy Time   Individual Concurrent Group Co-treatment   Time In 0915         Time Out 7671         Minutes 24         Timed Code Treatment Minutes: 24 Minutes       Michelle López OTR/L    If pt is unable to be seen after this session, please let this note serve as discharge summary. Please see case management note for discharge disposition. Thank you.

## 2020-07-14 NOTE — PROGRESS NOTES
Pt alert and oriented, VSS. Shift assessment completed and documented. Pt still with some suprapubic/flank tenderness, but is improving. C/o hip/back pain earlier, PRN tylenol and flexeril given, pt states pain is better. Pt denies any needs at this time. Up to chair, alarm in place. Will continue to monitor.

## 2020-07-14 NOTE — CARE COORDINATION
Chart reviewed. Covid resulted, negative. Spoke with Lieutenant Post at [employer]. Awaiting cert.  Annalise Juarez RN

## 2020-07-14 NOTE — PROGRESS NOTES
Assessment completed and documented. VSS. A/ox4. C/o 6/10 back, leg and abd pain. Given prn tylenol per mar. x1 assist with stedy. POC reviewed with patient and questions were answered about possible discharge tomorrow. Denies further needs at this time. Bed locked and in lowest position. Bedside table and call light within reach. Will continue to monitor.

## 2020-07-14 NOTE — PLAN OF CARE
Pt remained free of falls. Call light within reach. Bed alarm on. X1 assist with stedy. Non skid footwear in place. Bed locked and in lowest position. Will continue to monitor.

## 2020-07-14 NOTE — DISCHARGE SUMMARY
Hospital Medicine Discharge Summary    Patient ID: Shayne Mccormick      Patient's PCP: JOE Fagan CNP    Admit Date: 7/10/2020     Discharge Date: 7/14/2020      Admitting Physician: Rufus Rehman MD     Discharge Physician: JOE Weston CNP     Discharge Diagnoses: Active Hospital Problems    Diagnosis    Unable to ambulate [R26.2]    Acute cystitis without hematuria [N30.00]    UTI (urinary tract infection) [N39.0]       The patient was seen and examined on day of discharge and this discharge summary is in conjunction with any daily progress note from day of discharge. Hospital Course:   76 y. o. female who presented to [facility] ED with concerns that she have a UTI. Patient has a history of frequent UTIs and has noted to have 3 since the first of the year. Symptoms included urinary frequency, lower abdominal/back pain and burning with urination. She also is having generalized malaise, low-grade fevers and chills. She has no chest pain, shortness of breath, vomiting or diarrhea she does note some nausea and lack of appetite. She was started on ceftriaxone and admitted for further management. Acute cystitis (clinically improving):   - Present on arrival. UA and micro with pyuria. Urine culture grew Klebsiella. - Started on IV empiric ceftriaxone on admission. Changed to PO omnicef to complete 7 day course. - Recommend outpatient follow-up with Urology given history of frequent recurrent UTIs.      Deconditioning/debility:  - Patient had a hip replacement earlier this year and is still struggling with rehabilitation.  - PT/OT evaluations with recs for SNF.      Obesity:  - With Body mass index is 39.17 kg/m². Complicating assessment and treatment. Placing patient at risk for multiple co-morbidities as well as early death and contributing to the patient's presentation. Counseled on weight loss.        Physical Exam Performed:     /71   Pulse 82   Temp with PCP and Urology     Code Status:  Full Code     Activity: activity as tolerated    Diet: regular diet      Discharge Medications:     Discharge Medication List as of 7/14/2020 10:29 AM           Details   cefdinir (OMNICEF) 300 MG capsule Take 1 capsule by mouth 2 times daily for 4 days, Disp-8 capsule,R-0Normal              Details   cyclobenzaprine (FLEXERIL) 10 MG tablet Take 10 mg by mouth 3 times daily as needed for Muscle spasmsHistorical Med      acetaminophen (TYLENOL) 325 MG tablet Take 650 mg by mouth every 6 hours as needed for PainHistorical Med      multivitamin-iron-minerals-folic acid (CENTRUM) chewable tablet Take 1 tablet by mouth dailyHistorical Med             Time Spent on discharge is more than 45 minutes in the examination, evaluation, counseling and review of medications and discharge plan. Signed:    JOE Portillo CNP   7/14/2020      Thank you JOE Hernandez CNP for the opportunity to be involved in this patient's care. If you have any questions or concerns please feel free to contact me at [phone].

## 2020-07-15 ENCOUNTER — TELEPHONE (OUTPATIENT)
Dept: ORTHOPEDIC SURGERY | Age: 75
End: 2020-07-15

## 2020-07-15 NOTE — TELEPHONE ENCOUNTER
2/18/20 LT LAT THR BINDU    Patient is currently at the [facility]. They need to clarify any precautions or restrictions. Note: from 5/28  Treatment Plan: Patient is doing very well 14 weeks out from surgery. She did use a walker before surgery mostly because of her hip pain. Her therapist will continue to work on hip strengthening and range of motion. She can transition to a cane as her symptoms allow and her function improves. We would like to see the patient back 1 year from surgery. We have discussed predental and preinvasive procedure antibiotics and she expresses understanding.   We will refill her Flexeril and send predental antibiotics to the pharmacy.       Seen in ER on 7/10 - discharged to [facility] Problems     Diagnosis    Unable to ambulate [R26.2]    Acute cystitis without hematuria [N30.00]    UTI (urinary tract infection) [N39.0]

## 2020-08-10 PROBLEM — N39.0 UTI (URINARY TRACT INFECTION): Status: RESOLVED | Noted: 2019-09-07 | Resolved: 2020-08-10

## 2020-11-16 ENCOUNTER — TELEPHONE (OUTPATIENT)
Dept: ORTHOPEDIC SURGERY | Age: 75
End: 2020-11-16

## 2020-11-17 ENCOUNTER — TELEPHONE (OUTPATIENT)
Dept: ORTHOPEDIC SURGERY | Age: 75
End: 2020-11-17

## 2020-11-17 NOTE — TELEPHONE ENCOUNTER
Patient has bilateral hip replacements. She has an issue with her Thyroid. She needs to have a needle aspiration  And a CT scan with Resin will affect her hips at all. She has an appt Thursday. So she needs to know soon.

## 2020-11-18 ENCOUNTER — TELEPHONE (OUTPATIENT)
Dept: ORTHOPEDIC SURGERY | Age: 75
End: 2020-11-18

## 2020-11-18 NOTE — TELEPHONE ENCOUNTER
Its not the aspiration she is asking about. She was told having a CT w/ resin would like up he hips. She just wants to make sure she is ok to proceed.

## 2020-11-18 NOTE — TELEPHONE ENCOUNTER
I am not aware of any recent why the patient cannot have a CT scan with resin. This should not affect her joint replacements. Evaluating the thyroid is likely much needed issue. If the radiologist, or another physician has concerns with this they should contact the office directly.

## 2020-11-18 NOTE — TELEPHONE ENCOUNTER
Call transferred to provider's PSS   AND SEND AN EMAIL REGARDING THE PATIENT CONCERN ABOUT AN UPCOMING PROCEDURE

## 2021-04-30 ENCOUNTER — IMMUNIZATION (OUTPATIENT)
Dept: PRIMARY CARE CLINIC | Age: 76
End: 2021-04-30
Payer: MEDICARE

## 2021-04-30 PROCEDURE — 91300 COVID-19, PFIZER VACCINE 30MCG/0.3ML DOSE: CPT | Performed by: FAMILY MEDICINE

## 2021-04-30 PROCEDURE — 0001A COVID-19, PFIZER VACCINE 30MCG/0.3ML DOSE: CPT | Performed by: FAMILY MEDICINE

## 2021-05-21 ENCOUNTER — IMMUNIZATION (OUTPATIENT)
Dept: PRIMARY CARE CLINIC | Age: 76
End: 2021-05-21
Payer: MEDICARE

## 2021-05-21 PROCEDURE — 91300 COVID-19, PFIZER VACCINE 30MCG/0.3ML DOSE: CPT | Performed by: FAMILY MEDICINE

## 2021-05-21 PROCEDURE — 0002A COVID-19, PFIZER VACCINE 30MCG/0.3ML DOSE: CPT | Performed by: FAMILY MEDICINE

## 2021-07-15 ENCOUNTER — APPOINTMENT (OUTPATIENT)
Dept: CT IMAGING | Age: 76
End: 2021-07-15
Payer: MEDICARE

## 2021-07-15 ENCOUNTER — HOSPITAL ENCOUNTER (OUTPATIENT)
Age: 76
Setting detail: OBSERVATION
Discharge: NURSING, REHAB OR LONG-TERM CARE | End: 2021-07-16
Attending: EMERGENCY MEDICINE | Admitting: INTERNAL MEDICINE
Payer: MEDICARE

## 2021-07-15 DIAGNOSIS — M16.11 PRIMARY LOCALIZED OSTEOARTHRITIS OF RIGHT HIP: ICD-10-CM

## 2021-07-15 DIAGNOSIS — R26.2 INABILITY TO WALK: Primary | ICD-10-CM

## 2021-07-15 DIAGNOSIS — R26.81 UNSTEADY GAIT WHEN WALKING: ICD-10-CM

## 2021-07-15 DIAGNOSIS — N30.00 ACUTE CYSTITIS WITHOUT HEMATURIA: ICD-10-CM

## 2021-07-15 PROBLEM — E03.9 HYPOTHYROIDISM: Status: ACTIVE | Noted: 2021-07-15

## 2021-07-15 PROBLEM — E78.5 HLD (HYPERLIPIDEMIA): Status: ACTIVE | Noted: 2021-07-15

## 2021-07-15 LAB
A/G RATIO: 1.3 (ref 1.1–2.2)
ALBUMIN SERPL-MCNC: 4.4 G/DL (ref 3.4–5)
ALP BLD-CCNC: 97 U/L (ref 40–129)
ALT SERPL-CCNC: 16 U/L (ref 10–40)
ANION GAP SERPL CALCULATED.3IONS-SCNC: 11 MMOL/L (ref 3–16)
AST SERPL-CCNC: 17 U/L (ref 15–37)
BACTERIA: ABNORMAL /HPF
BASOPHILS ABSOLUTE: 0.1 K/UL (ref 0–0.2)
BASOPHILS RELATIVE PERCENT: 0.9 %
BILIRUB SERPL-MCNC: 0.3 MG/DL (ref 0–1)
BILIRUBIN URINE: NEGATIVE
BLOOD, URINE: NEGATIVE
BUN BLDV-MCNC: 18 MG/DL (ref 7–20)
CALCIUM SERPL-MCNC: 9.8 MG/DL (ref 8.3–10.6)
CHLORIDE BLD-SCNC: 101 MMOL/L (ref 99–110)
CLARITY: CLEAR
CO2: 28 MMOL/L (ref 21–32)
COLOR: YELLOW
CREAT SERPL-MCNC: 0.9 MG/DL (ref 0.6–1.2)
EOSINOPHILS ABSOLUTE: 0.1 K/UL (ref 0–0.6)
EOSINOPHILS RELATIVE PERCENT: 1.1 %
EPITHELIAL CELLS, UA: ABNORMAL /HPF (ref 0–5)
GFR AFRICAN AMERICAN: >60
GFR NON-AFRICAN AMERICAN: >60
GLOBULIN: 3.3 G/DL
GLUCOSE BLD-MCNC: 101 MG/DL (ref 70–99)
GLUCOSE URINE: NEGATIVE MG/DL
HCT VFR BLD CALC: 38.6 % (ref 36–48)
HEMOGLOBIN: 13.4 G/DL (ref 12–16)
KETONES, URINE: NEGATIVE MG/DL
LACTIC ACID: 1 MMOL/L (ref 0.4–2)
LEUKOCYTE ESTERASE, URINE: NEGATIVE
LIPASE: 38 U/L (ref 13–60)
LYMPHOCYTES ABSOLUTE: 2.2 K/UL (ref 1–5.1)
LYMPHOCYTES RELATIVE PERCENT: 22.3 %
MCH RBC QN AUTO: 32.6 PG (ref 26–34)
MCHC RBC AUTO-ENTMCNC: 34.7 G/DL (ref 31–36)
MCV RBC AUTO: 93.9 FL (ref 80–100)
MICROSCOPIC EXAMINATION: YES
MONOCYTES ABSOLUTE: 0.8 K/UL (ref 0–1.3)
MONOCYTES RELATIVE PERCENT: 8 %
NEUTROPHILS ABSOLUTE: 6.7 K/UL (ref 1.7–7.7)
NEUTROPHILS RELATIVE PERCENT: 67.7 %
NITRITE, URINE: POSITIVE
PDW BLD-RTO: 13 % (ref 12.4–15.4)
PH UA: 7 (ref 5–8)
PLATELET # BLD: 191 K/UL (ref 135–450)
PMV BLD AUTO: 8 FL (ref 5–10.5)
POTASSIUM REFLEX MAGNESIUM: 3.9 MMOL/L (ref 3.5–5.1)
PROTEIN UA: NEGATIVE MG/DL
RBC # BLD: 4.11 M/UL (ref 4–5.2)
RBC UA: ABNORMAL /HPF (ref 0–4)
SODIUM BLD-SCNC: 140 MMOL/L (ref 136–145)
SPECIFIC GRAVITY UA: 1.02 (ref 1–1.03)
TOTAL CK: 48 U/L (ref 26–192)
TOTAL PROTEIN: 7.7 G/DL (ref 6.4–8.2)
TROPONIN: <0.01 NG/ML
URINE TYPE: ABNORMAL
UROBILINOGEN, URINE: 0.2 E.U./DL
WBC # BLD: 10 K/UL (ref 4–11)
WBC UA: ABNORMAL /HPF (ref 0–5)

## 2021-07-15 PROCEDURE — 85025 COMPLETE CBC W/AUTO DIFF WBC: CPT

## 2021-07-15 PROCEDURE — 6370000000 HC RX 637 (ALT 250 FOR IP): Performed by: NURSE PRACTITIONER

## 2021-07-15 PROCEDURE — 83690 ASSAY OF LIPASE: CPT

## 2021-07-15 PROCEDURE — 71260 CT THORAX DX C+: CPT

## 2021-07-15 PROCEDURE — 99284 EMERGENCY DEPT VISIT MOD MDM: CPT

## 2021-07-15 PROCEDURE — G0378 HOSPITAL OBSERVATION PER HR: HCPCS

## 2021-07-15 PROCEDURE — 96375 TX/PRO/DX INJ NEW DRUG ADDON: CPT

## 2021-07-15 PROCEDURE — 93005 ELECTROCARDIOGRAM TRACING: CPT | Performed by: NURSE PRACTITIONER

## 2021-07-15 PROCEDURE — 87186 SC STD MICRODIL/AGAR DIL: CPT

## 2021-07-15 PROCEDURE — 6360000002 HC RX W HCPCS: Performed by: INTERNAL MEDICINE

## 2021-07-15 PROCEDURE — 83605 ASSAY OF LACTIC ACID: CPT

## 2021-07-15 PROCEDURE — 1200000000 HC SEMI PRIVATE

## 2021-07-15 PROCEDURE — 82550 ASSAY OF CK (CPK): CPT

## 2021-07-15 PROCEDURE — 96365 THER/PROPH/DIAG IV INF INIT: CPT

## 2021-07-15 PROCEDURE — 84484 ASSAY OF TROPONIN QUANT: CPT

## 2021-07-15 PROCEDURE — 81001 URINALYSIS AUTO W/SCOPE: CPT

## 2021-07-15 PROCEDURE — 6360000004 HC RX CONTRAST MEDICATION: Performed by: NURSE PRACTITIONER

## 2021-07-15 PROCEDURE — 2580000003 HC RX 258: Performed by: INTERNAL MEDICINE

## 2021-07-15 PROCEDURE — 70450 CT HEAD/BRAIN W/O DYE: CPT

## 2021-07-15 PROCEDURE — 2580000003 HC RX 258: Performed by: NURSE PRACTITIONER

## 2021-07-15 PROCEDURE — 6360000002 HC RX W HCPCS: Performed by: NURSE PRACTITIONER

## 2021-07-15 PROCEDURE — 87086 URINE CULTURE/COLONY COUNT: CPT

## 2021-07-15 PROCEDURE — 80053 COMPREHEN METABOLIC PANEL: CPT

## 2021-07-15 PROCEDURE — 74177 CT ABD & PELVIS W/CONTRAST: CPT

## 2021-07-15 PROCEDURE — 87088 URINE BACTERIA CULTURE: CPT

## 2021-07-15 RX ORDER — SODIUM CHLORIDE 9 MG/ML
25 INJECTION, SOLUTION INTRAVENOUS PRN
Status: DISCONTINUED | OUTPATIENT
Start: 2021-07-15 | End: 2021-07-16 | Stop reason: HOSPADM

## 2021-07-15 RX ORDER — M-VIT,TX,IRON,MINS/CALC/FOLIC 27MG-0.4MG
1 TABLET ORAL DAILY
Status: DISCONTINUED | OUTPATIENT
Start: 2021-07-16 | End: 2021-07-16 | Stop reason: HOSPADM

## 2021-07-15 RX ORDER — MECOBALAMIN 5000 MCG
5 TABLET,DISINTEGRATING ORAL NIGHTLY PRN
Status: DISCONTINUED | OUTPATIENT
Start: 2021-07-15 | End: 2021-07-16 | Stop reason: HOSPADM

## 2021-07-15 RX ORDER — ACETAMINOPHEN 650 MG/1
650 SUPPOSITORY RECTAL EVERY 6 HOURS PRN
Status: DISCONTINUED | OUTPATIENT
Start: 2021-07-15 | End: 2021-07-16 | Stop reason: HOSPADM

## 2021-07-15 RX ORDER — ONDANSETRON 4 MG/1
4 TABLET, ORALLY DISINTEGRATING ORAL EVERY 8 HOURS PRN
Status: DISCONTINUED | OUTPATIENT
Start: 2021-07-15 | End: 2021-07-16 | Stop reason: HOSPADM

## 2021-07-15 RX ORDER — ATORVASTATIN CALCIUM 10 MG/1
20 TABLET, FILM COATED ORAL DAILY
Status: DISCONTINUED | OUTPATIENT
Start: 2021-07-16 | End: 2021-07-16 | Stop reason: HOSPADM

## 2021-07-15 RX ORDER — ONDANSETRON 2 MG/ML
4 INJECTION INTRAMUSCULAR; INTRAVENOUS EVERY 6 HOURS PRN
Status: DISCONTINUED | OUTPATIENT
Start: 2021-07-15 | End: 2021-07-16 | Stop reason: HOSPADM

## 2021-07-15 RX ORDER — SODIUM CHLORIDE 0.9 % (FLUSH) 0.9 %
5-40 SYRINGE (ML) INJECTION EVERY 12 HOURS SCHEDULED
Status: DISCONTINUED | OUTPATIENT
Start: 2021-07-15 | End: 2021-07-16 | Stop reason: HOSPADM

## 2021-07-15 RX ORDER — ACETAMINOPHEN 500 MG
1000 TABLET ORAL ONCE
Status: COMPLETED | OUTPATIENT
Start: 2021-07-15 | End: 2021-07-15

## 2021-07-15 RX ORDER — ACETAMINOPHEN 325 MG/1
650 TABLET ORAL EVERY 6 HOURS PRN
Status: DISCONTINUED | OUTPATIENT
Start: 2021-07-15 | End: 2021-07-16 | Stop reason: HOSPADM

## 2021-07-15 RX ORDER — ATORVASTATIN CALCIUM 20 MG/1
20 TABLET, FILM COATED ORAL DAILY
COMMUNITY

## 2021-07-15 RX ORDER — CYCLOBENZAPRINE HCL 10 MG
10 TABLET ORAL 3 TIMES DAILY PRN
Status: DISCONTINUED | OUTPATIENT
Start: 2021-07-15 | End: 2021-07-16 | Stop reason: HOSPADM

## 2021-07-15 RX ORDER — SODIUM CHLORIDE 0.9 % (FLUSH) 0.9 %
5-40 SYRINGE (ML) INJECTION PRN
Status: DISCONTINUED | OUTPATIENT
Start: 2021-07-15 | End: 2021-07-16 | Stop reason: HOSPADM

## 2021-07-15 RX ORDER — LEVOTHYROXINE SODIUM 0.03 MG/1
25 TABLET ORAL DAILY
COMMUNITY

## 2021-07-15 RX ORDER — POLYETHYLENE GLYCOL 3350 17 G/17G
17 POWDER, FOR SOLUTION ORAL DAILY PRN
Status: DISCONTINUED | OUTPATIENT
Start: 2021-07-15 | End: 2021-07-16 | Stop reason: HOSPADM

## 2021-07-15 RX ORDER — LEVOTHYROXINE SODIUM 0.03 MG/1
25 TABLET ORAL DAILY
Status: DISCONTINUED | OUTPATIENT
Start: 2021-07-16 | End: 2021-07-16 | Stop reason: HOSPADM

## 2021-07-15 RX ADMIN — IOPAMIDOL 75 ML: 755 INJECTION, SOLUTION INTRAVENOUS at 19:00

## 2021-07-15 RX ADMIN — ACETAMINOPHEN 1000 MG: 500 TABLET ORAL at 20:13

## 2021-07-15 RX ADMIN — Medication 10 ML: at 23:02

## 2021-07-15 RX ADMIN — ONDANSETRON 4 MG: 2 INJECTION INTRAMUSCULAR; INTRAVENOUS at 23:01

## 2021-07-15 RX ADMIN — CEFTRIAXONE SODIUM 1000 MG: 1 INJECTION, POWDER, FOR SOLUTION INTRAMUSCULAR; INTRAVENOUS at 19:16

## 2021-07-15 ASSESSMENT — PAIN SCALES - GENERAL
PAINLEVEL_OUTOF10: 5
PAINLEVEL_OUTOF10: 6
PAINLEVEL_OUTOF10: 9
PAINLEVEL_OUTOF10: 6

## 2021-07-15 ASSESSMENT — PAIN DESCRIPTION - LOCATION: LOCATION: ABDOMEN

## 2021-07-15 NOTE — ED NOTES

## 2021-07-15 NOTE — ED PROVIDER NOTES
pain.     Treatments tried prior to arrival in the ED include: tylenol last at 1 pm today. She took her synthroid, lipitor and a tylenol    The patient arrived to the ED via private car. PAST MEDICAL HISTORY    Past Medical History:   Diagnosis Date    Deep vein thrombosis (DVT) (HCC)     RLE    Degenerative disc disease, lumbar     Hx of blood clots     Localized osteoarthrosis of left hip 2/18/2020    Neuropathy        SURGICAL HISTORY    Past Surgical History:   Procedure Laterality Date    HIP SURGERY      HYSTERECTOMY      JOINT REPLACEMENT      TOTAL HIP ARTHROPLASTY Right 10/9/2019    RIGHT LATERAL TOTAL HIP MAKOPLASTY WITH [place], [address] FOR PAIN CONTROL     ITZEL BINDU  CPT CODE - 94682, [address]16 performed by Catrachito Thomas MD at [place] [address] Left 2/18/2020    LEFT LATERAL TOTAL HIP [address] - [id number], [address] performed by Catrachito Thomas MD at [address]    Current Outpatient Rx   Medication Sig Dispense Refill    cyclobenzaprine (FLEXERIL) 10 MG tablet Take 10 mg by mouth 3 times daily as needed for Muscle spasms      acetaminophen (TYLENOL) 325 MG tablet Take 650 mg by mouth every 6 hours as needed for Pain      multivitamin-iron-minerals-folic acid (CENTRUM) chewable tablet Take 1 tablet by mouth daily         ALLERGIES    Allergies   Allergen Reactions    Norco [Hydrocodone-Acetaminophen] Nausea And Vomiting       FAMILY HISTORY    Family History   Problem Relation Age of Onset    Heart Disease Mother     Diabetes Father     Heart Disease Father        SOCIAL HISTORY    Social History     Socioeconomic History    Marital status:       Spouse name: None    Number of children: None    Years of education: None    Highest education level: None   Occupational History    None   Tobacco Use    Smoking status: Never Smoker    Smokeless tobacco: Never Used   Vaping Use    Vaping Use: Never used   Substance and Sexual Activity    Alcohol use: Never    Drug use: Never    Sexual activity: None   Other Topics Concern    None   Social History Narrative    None     Social Determinants of Health     Financial Resource Strain:     Difficulty of Paying Living Expenses:    Food Insecurity:     Worried About Running Out of Food in the Last Year:     Ran Out of Food in the Last Year:    Transportation Needs:     Lack of Transportation (Medical):  Lack of Transportation (Non-Medical):    Physical Activity:     Days of Exercise per Week:     Minutes of Exercise per Session:    Stress:     Feeling of Stress :    Social Connections:     Frequency of Communication with Friends and Family:     Frequency of Social Gatherings with Friends and Family:     Attends Baptism Services:     Active Member of Clubs or Organizations:     Attends Club or Organization Meetings:     Marital Status:    Intimate Partner Violence:     Fear of Current or Ex-Partner:     Emotionally Abused:     Physically Abused:     Sexually Abused:        REVIEW OF SYSTEMS    10 systems reviewed, pertinent positives per HPI otherwise noted to be negative    PHYSICAL EXAM  Vitals:    07/15/21 2120   BP: (!) 144/66   Pulse: 78   Resp:    Temp:    SpO2:      GENERAL: Patient is elderly, obese. Awake and alert. Cooperative. Resting in bed. No apparent distress. HEENT:  Normocephalic, atraumatic. Conjunctiva appear normal. Sclera is non-icteric. External ears are normal.    NECK: Supple with normal ROM. Trachea midline. LUNGS: Equal and symmetric chest rise. Breathing is unlabored. Speaking comfortably in full sentences. Lungs are clear bilaterally to auscultation. Without wheezing, rales, or rhonchi. CADIOVASCULAR:  Regular rate and rhythm. Normal S1-S2 sounds. No murmurs, rubs, or gallops. Capillary refill is brisk in all 4 extremities.  Bilateral lower extremities are equal SYSTEM PROVIDED HISTORY: generalized abdominal pain TECHNOLOGIST PROVIDED HISTORY: Additional Contrast?->None Reason for exam:->generalized abdominal pain Decision Support Exception - unselect if not a suspected or confirmed emergency medical condition->Emergency Medical Condition (MA) Reason for Exam: lo pelvis and low back pain, bilat leg pain, unsteady on feet Relevant Medical/Surgical History: hysterectomy FINDINGS: Lower Chest: No acute infiltrate at the lung bases. Organs: The liver, spleen, pancreas and adrenal glands are unremarkable. The gallbladder is contracted. No biliary dilatation. No renal mass or significant hydronephrosis. Peripelvic cysts are noted bilaterally. GI/Bowel: Colonic diverticulosis with no acute features. There is no evidence of appendicitis. No small bowel distension. The stomach and duodenal sweep are intact. Pelvis: Evaluation of the pelvis is limited by beam hardening artifact from bilateral hip prostheses. No obvious mass or significant free fluid. Partial distention of the urinary bladder. Peritoneum/Retroperitoneum: The abdominal aorta is normal in caliber with scattered calcified plaque. No retroperitoneal adenopathy or upper abdominal ascites. Bones/Soft Tissues: No acute osseous or soft tissue abnormality. Small fat containing ventral abdominal wall hernia. Multilevel osteoarthritic spurring throughout the thoracic and lumbar spine. No acute findings within the abdomen or pelvis. Colonic diverticulosis with no acute features. No evidence of appendicitis. CT CHEST PULMONARY EMBOLISM W CONTRAST    Result Date: 7/15/2021  EXAMINATION: CTA OF THE CHEST 7/15/2021 6:36 pm TECHNIQUE: CTA of the chest was performed after the administration of intravenous contrast.  Multiplanar reformatted images are provided for review. MIP images are provided for review.  Dose modulation, iterative reconstruction, and/or weight based adjustment of the mA/kV was utilized to reduce the radiation dose to as low as reasonably achievable. COMPARISON: None. HISTORY: ORDERING SYSTEM PROVIDED HISTORY: CP, SOB, history of blood clots TECHNOLOGIST PROVIDED HISTORY: Reason for exam:->CP, SOB, history of blood clots Decision Support Exception - unselect if not a suspected or confirmed emergency medical condition->Emergency Medical Condition (MA) Reason for Exam: pain in chest going down both arms x 1 week Acuity: Acute Type of Exam: Initial FINDINGS: Pulmonary Arteries: This study is not adequately optimized for evaluation of pulmonary arteries. No central filling defects are noted the possible small distal pulmonary emboli cannot be excluded on this study. Mediastinum: No evidence of mediastinal lymphadenopathy. The heart and pericardium demonstrate no acute abnormality. There is no acute abnormality of the thoracic aorta. No evidence for septal bowing. No evidence of pericardial effusion. Lungs/pleura: The lungs are without acute process. .  No focal consolidation or pulmonary edema. No evidence of pleural effusion or pneumothorax. Upper Abdomen: Limited images of the upper abdomen are unremarkable. Soft Tissues/Bones: No acute bone or soft tissue abnormality. Study is not optimized for evaluation of pulmonary arteries. No central filling defects are noted the possible distal pulmonary emboli cannot be totally excluded on this study. No evidence for right ventricular strain. No acute infiltrates or effusions. ED COURSE/MDM  Patient seen and evaluated. Old records reviewed. Diagnostic testing reviewed and results discussed. I have seen and evaluated this patient with supervising physician: Lindy Rodriguez MD. We thoroughly discussed the history, physical exam, diagnostic testing, emergency department course, plan and disposition. Charlie Ferrell presented to the ED today with above noted complaints.  Physical exam without adventitious breath sounds on exam.  She does have generalized abdominal tenderness to palpation. Patient neurologically intact. No focal or lateralizing deficits on exam.  While in the ED she is afebrile and hemodynamically stable. She is well saturated on room air. Blood work without evidence of systemic infection. No anemia. There is no electrolyte abnormality. No evidence of acute kidney injury or transaminitis. Lipase is normal.  Troponin is negative. Lactic acid level is normal.  EKG shows sinus rhythm with first-degree AV block. Urinalysis shows positive nitrites, 10-20 white blood cells upon microscopic. 4+ bacteria. This will be sent for culture and patient will be given Rocephin IV here in the ED. CT of the chest shows no central filling defects noted, due to a not optimal study distal pulmonary emboli cannot totally be excluded. There is no evidence for right ventricular strain. No acute effusions or infiltrates. CT of the abdomen and pelvis was obtained and shows no acute findings. There is colonic diverticulosis with no acute features. No evidence of appendicitis. CT of the head was obtained and without acute findings. Pt was given the following medications or treatments in the ED:   Medications   cefTRIAXone (ROCEPHIN) 1000 mg IVPB in 50 mL D5W minibag (0 mg Intravenous Stopped 7/15/21 2005)   iopamidol (ISOVUE-370) 76 % injection 75 mL (75 mLs Intravenous Given 7/15/21 1900)   acetaminophen (TYLENOL) tablet 1,000 mg (1,000 mg Oral Given 7/15/21 2013)     Patient was attempted to ambulate and per nursing staff and was very unsteady even trying to ambulate with a walker and she wants to be admitted. I advised her that if she is going to be admitted she will need to be evaluated for rehab placement and the patient stated this is what she wants and she wants to go to the [facility]. Basil Jacob will be admitted for further observation and evaluation of Leslie Stewart's inability to ambulate and UTI.   As I have deemed necessary from their history, physical and studies, I have considered and evaluated Fany Duque for the following diagnoses:  ACUTE APPENDICITIS, BOWEL OBSTRUCTION, CHOLECYSTITIS, DIVERTICULITIS, INCARCERATED HERNIA, PANCREATITIS, PERFORATED BOWEL/ULCER, ACUTE CORONARY SYNDROME, CHRONIC OBSTRUCTIVE PULMONARY DISEASE, CONGESTIVE HEART FAILURE, PERICARDIAL TAMPONADE, PNEUMONIA, PNEUMOTHORAX, PULMONARY EMBOLISM, SEPSIS, THORACIC DISSECTION, DIABETES, INTRACRANIAL HEMORRHAGE, MENINGITIS, SEPSIS, SUBARACHNOID HEMORRHAGE, SUBDURAL HEMATOMA, STROKE. CLINICAL IMPRESSION    1. Inability to walk    2. Unsteady gait when walking    3. Acute cystitis without hematuria       DISPOSITION  Admit. Comment: Please note this report has been produced using speech recognition software and may contain errors related to that system including errors in grammar, punctuation, and spelling, as well as words and phrases that may be inappropriate. If there are any questions or concerns please feel free to contact the dictating provider for clarification.              JOE Solomon - BEBE  07/15/21 1768

## 2021-07-16 ENCOUNTER — APPOINTMENT (OUTPATIENT)
Dept: MRI IMAGING | Age: 76
End: 2021-07-16
Payer: MEDICARE

## 2021-07-16 VITALS
RESPIRATION RATE: 16 BRPM | DIASTOLIC BLOOD PRESSURE: 76 MMHG | HEIGHT: 64 IN | OXYGEN SATURATION: 96 % | TEMPERATURE: 97.7 F | WEIGHT: 222.88 LBS | HEART RATE: 84 BPM | SYSTOLIC BLOOD PRESSURE: 125 MMHG | BODY MASS INDEX: 38.05 KG/M2

## 2021-07-16 PROBLEM — N39.0 UTI (URINARY TRACT INFECTION): Status: ACTIVE | Noted: 2021-07-16

## 2021-07-16 LAB
EKG ATRIAL RATE: 76 BPM
EKG DIAGNOSIS: NORMAL
EKG P AXIS: 28 DEGREES
EKG P-R INTERVAL: 216 MS
EKG Q-T INTERVAL: 396 MS
EKG QRS DURATION: 122 MS
EKG QTC CALCULATION (BAZETT): 445 MS
EKG R AXIS: -48 DEGREES
EKG T AXIS: 52 DEGREES
EKG VENTRICULAR RATE: 76 BPM
SARS-COV-2, NAAT: NOT DETECTED

## 2021-07-16 PROCEDURE — 2580000003 HC RX 258: Performed by: INTERNAL MEDICINE

## 2021-07-16 PROCEDURE — 93010 ELECTROCARDIOGRAM REPORT: CPT | Performed by: INTERNAL MEDICINE

## 2021-07-16 PROCEDURE — G0378 HOSPITAL OBSERVATION PER HR: HCPCS

## 2021-07-16 PROCEDURE — 97530 THERAPEUTIC ACTIVITIES: CPT

## 2021-07-16 PROCEDURE — 97167 OT EVAL HIGH COMPLEX 60 MIN: CPT

## 2021-07-16 PROCEDURE — 97116 GAIT TRAINING THERAPY: CPT

## 2021-07-16 PROCEDURE — 6360000002 HC RX W HCPCS: Performed by: INTERNAL MEDICINE

## 2021-07-16 PROCEDURE — 6370000000 HC RX 637 (ALT 250 FOR IP): Performed by: NURSE PRACTITIONER

## 2021-07-16 PROCEDURE — 6370000000 HC RX 637 (ALT 250 FOR IP): Performed by: INTERNAL MEDICINE

## 2021-07-16 PROCEDURE — 96372 THER/PROPH/DIAG INJ SC/IM: CPT

## 2021-07-16 PROCEDURE — 96376 TX/PRO/DX INJ SAME DRUG ADON: CPT

## 2021-07-16 PROCEDURE — 97163 PT EVAL HIGH COMPLEX 45 MIN: CPT

## 2021-07-16 PROCEDURE — 87635 SARS-COV-2 COVID-19 AMP PRB: CPT

## 2021-07-16 PROCEDURE — 70551 MRI BRAIN STEM W/O DYE: CPT

## 2021-07-16 PROCEDURE — 97110 THERAPEUTIC EXERCISES: CPT

## 2021-07-16 PROCEDURE — 97535 SELF CARE MNGMENT TRAINING: CPT

## 2021-07-16 RX ORDER — CEFDINIR 300 MG/1
300 CAPSULE ORAL 2 TIMES DAILY
Qty: 18 CAPSULE | Refills: 0
Start: 2021-07-16 | End: 2021-07-25

## 2021-07-16 RX ORDER — CYCLOBENZAPRINE HCL 10 MG
10 TABLET ORAL 3 TIMES DAILY PRN
Qty: 20 TABLET | Refills: 0 | Status: SHIPPED | OUTPATIENT
Start: 2021-07-16

## 2021-07-16 RX ADMIN — MULTIPLE VITAMINS W/ MINERALS TAB 1 TABLET: TAB at 08:23

## 2021-07-16 RX ADMIN — ENOXAPARIN SODIUM 40 MG: 40 INJECTION SUBCUTANEOUS at 08:23

## 2021-07-16 RX ADMIN — Medication 1 LOZENGE: at 11:33

## 2021-07-16 RX ADMIN — Medication 5 MG: at 00:30

## 2021-07-16 RX ADMIN — LEVOTHYROXINE SODIUM 25 MCG: 0.03 TABLET ORAL at 06:08

## 2021-07-16 RX ADMIN — Medication 10 ML: at 08:24

## 2021-07-16 RX ADMIN — CYCLOBENZAPRINE 10 MG: 10 TABLET, FILM COATED ORAL at 04:50

## 2021-07-16 RX ADMIN — ACETAMINOPHEN 650 MG: 325 TABLET ORAL at 17:36

## 2021-07-16 RX ADMIN — ACETAMINOPHEN 650 MG: 325 TABLET ORAL at 04:51

## 2021-07-16 RX ADMIN — ONDANSETRON 4 MG: 2 INJECTION INTRAMUSCULAR; INTRAVENOUS at 08:26

## 2021-07-16 RX ADMIN — ATORVASTATIN CALCIUM 20 MG: 10 TABLET, FILM COATED ORAL at 08:23

## 2021-07-16 ASSESSMENT — PAIN SCALES - WONG BAKER
WONGBAKER_NUMERICALRESPONSE: 4
WONGBAKER_NUMERICALRESPONSE: 4

## 2021-07-16 ASSESSMENT — PAIN DESCRIPTION - ORIENTATION: ORIENTATION: POSTERIOR

## 2021-07-16 ASSESSMENT — PAIN SCALES - GENERAL
PAINLEVEL_OUTOF10: 6
PAINLEVEL_OUTOF10: 6

## 2021-07-16 ASSESSMENT — PAIN DESCRIPTION - PAIN TYPE
TYPE: ACUTE PAIN
TYPE: ACUTE PAIN

## 2021-07-16 ASSESSMENT — PAIN DESCRIPTION - LOCATION
LOCATION: HEAD
LOCATION: NECK

## 2021-07-16 NOTE — CARE COORDINATION
Therapy worked with patient. MD returned to evaluate mobility. MRI ordered and pending. [facility] cert started. Zully Johnson RN      [address] approved ref #[id number] [address] # [id number] approved starting today for 5 days. Next review 20th please fax continuing stay info to Tyron Kelly. Waiting MRI results.  Zully Johnson RN

## 2021-07-16 NOTE — PROGRESS NOTES
Occupational Therapy   Occupational Therapy Initial Assessment/Treatment   Date: 2021   Patient Name: Carolin Haynes  MRN: [id number]     : 1945    Date of Service: 2021    Discharge Recommendations:  [address]  OT Equipment Recommendations  Other: defer    Assessment   Performance deficits / Impairments: Decreased functional mobility ; Decreased safe awareness;Decreased balance;Decreased coordination;Decreased ADL status; Decreased endurance;Decreased strength;Decreased high-level IADLs;Decreased cognition;Decreased posture  Assessment: Pt 75 yo female functioning with deficits  in the areas listed above following increased weakness at home. Pt is a questionable historian and reports IND PLOF. Pt reports yesterday she was walking without an assistive device and is now requiring max A x2 due to generalized ataxia. Pt reports \"this always happens\". Pt educated on increased safety awareness and importance of BUE exercises to build strength and endurance. Pt is not safe to return home at this time due to poor insight into deficits and current Ax2 for transfers. Disease Specific Education: Pt educated on importance of OOB mobility, prevention of complications of bedrest, and general safety during hospitalization. Pt verbalized understanding      Prognosis: Good  Decision Making: Medium Complexity  OT Education: OT Role;Energy Conservation;Transfer Training;Plan of Care;Precautions; ADL Adaptive Strategies  REQUIRES OT FOLLOW UP: Yes  Activity Tolerance  Activity Tolerance: Patient Tolerated treatment well;Treatment limited secondary to decreased cognition  Activity Tolerance: /82 HR 72 O2 96%  Safety Devices  Safety Devices in place: Yes  Type of devices: Call light within reach; Chair alarm in place;Nurse notified; Left in chair;Gait belt           Patient Diagnosis(es): The primary encounter diagnosis was Inability to walk.  Diagnoses of Unsteady gait when walking, Acute cystitis without hematuria, and Primary localized osteoarthritis of right hip were also pertinent to this visit. has a past medical history of Deep vein thrombosis (DVT) ([address] Degenerative disc disease, lumbar, HLD (hyperlipidemia), Hx of blood clots, Hypothyroidism, Localized osteoarthrosis of left hip, and Neuropathy. has a past surgical history that includes Hysterectomy; Total hip arthroplasty (Right, 10/9/2019); joint replacement; hip surgery; and Total hip arthroplasty (Left, 2/18/2020).            Restrictions  Restrictions/Precautions  Restrictions/Precautions: General Precautions, Fall Risk, Up as Tolerated    Subjective   General  Chart Reviewed: Yes  Patient assessed for rehabilitation services?: Yes  Family / Caregiver Present: No  Referring Practitioner: Victorino Callahan MD  Diagnosis: weakness  Subjective  Subjective: Pt agreeable to therapy  General Comment  Comments: RN approved therapy  Patient Currently in Pain: Yes  Pain Assessment  Pain Assessment: Faces  Nix-Baker Pain Rating: Hurts little more  Pain Type: Acute pain  Pain Location: Neck  Pain Orientation: Posterior  Non-Pharmaceutical Pain Intervention(s): Ambulation/Increased Activity;Repositioned  Response to Pain Intervention: Patient Satisfied  Vital Signs  Pulse: 72  BP: (!) 147/82  Patient Currently in Pain: Yes  Oxygen Therapy  SpO2: 96 %  O2 Device: None (Room air)     Social/Functional History  Social/Functional History  Lives With: Family (sister)  Type of Home: House  Home Layout: Two level, Able to Live on Main level with bedroom/bathroom (stair lift to second floor)  Home Access: Stairs to enter with rails  Entrance Stairs - Number of Steps: 4  Entrance Stairs - Rails: Both  Bathroom Shower/Tub: Tub/Shower unit  Bathroom Toilet: Standard  Bathroom Equipment: Toilet raiser, Tub transfer bench, Grab bars in [address]: Cane, 4 wheeled walker, Rolling walker  ADL Assistance: Independent  Homemaking Assistance: Independent  Homemaking Responsibilities: Yes  Ambulation Assistance: Independent (no device)  Transfer Assistance: Independent  Active : No  Patient's  Info: family or frients  Leisure & Hobbies: go out to eat  Additional Comments: denies falls       Objective        Orientation  Overall Orientation Status: Within Functional Limits  Observation/Palpation  Observation: ataxic movement in trunk, neck, BUE, BLE for all mobility  Balance  Sitting Balance: Minimal assistance  Standing Balance: Dependent/Total (max Ax2 with RW)  Standing Balance  Time: ~30 seconds x3  Activity: transfers, standing for LB dressing  Functional Mobility  Functional - Mobility Device: Rolling Walker  Activity: Other  Assist Level: Dependent/Total  Functional Mobility Comments: max A x2 from bed to chair  ADL  LE Dressing: Maximum assistance (change brief)  Toileting: Dependent/Total (incontinent)  Tone RUE  RUE Tone: Normotonic  Tone LUE  LUE Tone: Normotonic  Coordination  Movements Are Fluid And Coordinated: No  Coordination and Movement description: Fine motor impairments;Gross motor impairments;Right UE; Ataxia; Left UE     Bed mobility  Supine to Sit: Moderate assistance (HOB elevated, bed rail)  Sit to Supine: Unable to assess (up in chair at end of session)  Transfers  Stand Step Transfers: Maximum assistance;2 Person assistance  Sit to stand: 2 Person assistance;Maximum assistance  Stand to sit: Maximum assistance;2 Person assistance     Cognition  Overall Cognitive Status: Exceptions  Attention Span: Attends with cues to redirect  Memory: Decreased long term memory;Decreased short term memory  Safety Judgement: Decreased awareness of need for assistance  Insights: Decreased awareness of deficits        Sensation  Overall Sensation Status: Impaired (baseline numbness bilateral hands and feet)  Type of ROM/Therapeutic Exercise  Type of ROM/Therapeutic Exercise: AROM  Exercises  Shoulder Flexion: x10  Elbow Flexion: x10  Elbow Extension: x10  Supination: x10  Pronation: x10  Wrist Flexion: x10  Wrist Extension: x10  Grasp/Release: x10     LUE AROM (degrees)  LUE AROM : WFL  RUE AROM (degrees)  RUE AROM : WFL  LUE Strength  Gross LUE Strength: WFL  LUE Strength Comment: some noted weakness for shoulder flexion during functional tasks  RUE Strength  Gross RUE Strength: WFL  RUE Strength Comment: some noted weakness for shoulder flexion during functional tasks                   Plan   Plan  Times per week: 3-5x/wk  Current Treatment Recommendations: Strengthening, Endurance Training, Neuromuscular Re-education, Balance Training, Functional Mobility Training, Safety Education & Training, Self-Care / ADL             AM-PAC Score        AM-PAC Inpatient Daily Activity Raw Score: 12 (07/16/21 1028)  AM-PAC Inpatient ADL T-Scale Score : 30.6 (07/16/21 1028)  ADL Inpatient CMS 0-100% Score: 66.57 (07/16/21 1028)  ADL Inpatient CMS G-Code Modifier : CL (07/16/21 1028)    Goals  Short term goals  Time Frame for Short term goals: 1 week (7/24/21)  Short term goal 1: Pt will complete toilet transfer SBA. Short term goal 2: Pt will complete LB dressing with S.  Short term goal 3: Pt will complete 15 reps of BUE exercises for increased endurance and strength. (7/20/21)  Patient Goals   Patient goals : \"to get stronger\"       Therapy Time   Individual Concurrent Group Co-treatment   Time In 0934         Time Out 1010         Minutes 36         Timed Code Treatment Minutes: 26 Minutes (10 minutes for evaluation)       DOUG Chicas/L    If pt is unable to be seen after this session, please let this note serve as discharge summary. Please see case management note for discharge disposition. Thank you.

## 2021-07-16 NOTE — PLAN OF CARE
Pt remains free from falls during this shift. Pt a/o and calls out appropriately. Pt stood and pivoted from stretcher to bed with writer. Pt tolerated very poorly. Bed in lowest position and wheels locked. Call light within reach. Bedside table within reach.     Problem: Falls - Risk of:  Goal: Will remain free from falls  Description: Will remain free from falls  Outcome: Ongoing  Goal: Absence of physical injury  Description: Absence of physical injury  Outcome: Ongoing

## 2021-07-16 NOTE — PROGRESS NOTES
Pt transferred to C3 in stable condition. VSS. Pt oriented to room. 4 eyed skin assessment performed. Will continue to monitor.

## 2021-07-16 NOTE — DISCHARGE INSTR - COC
Continuity of Care Form    Patient Name: Silvio Ochoa   :  1945  MRN:  [id number]    Admit date:  7/15/2021  Discharge date:  2021    Code Status Order: DNR-CC   Advance Directives:      Admitting Physician:  Rosey Smith MD  PCP: JOE Hewitt CNP    Discharging Nurse: Raj Arenas [facility] Unit/Room#: 7855/1939-02  Discharging Unit Phone Number: [phone]    Emergency Contact:   Extended Emergency Contact Information  Primary Emergency Contact: Ella Quiroz  Home Phone: [phone]  Mobile Phone: [phone]  Relation: Brother/Sister  Preferred language: English    Past Surgical History:  Past Surgical History:   Procedure Laterality Date    HIP SURGERY      HYSTERECTOMY      JOINT REPLACEMENT      TOTAL HIP ARTHROPLASTY Right 10/9/2019    RIGHT LATERAL TOTAL HIP MAKOPLASTY WITH CELLSAVER, [address]  CPT CODE - 34796, [address] performed by Isela Valenzuela MD at [address] [facility] Left 2020    LEFT LATERAL TOTAL HIP [address] [facility] - [address], [address] performed by Isela Valenzuela MD at [place] [address]       Immunization History:   Immunization History   Administered Date(s) Administered    COVID-19, Donald Noyola, PF, 30mcg/0.3mL 2021, 2021    Influenza, Quadv, IM, PF (6 mo and older Fluzone, Flulaval, Fluarix, and 3 yrs and older Afluria) 10/10/2019       Active Problems:  Patient Active Problem List   Diagnosis Code    Right hip pain M25.551    Fall on same level from tripping as cause of accidental injury W01. 0XXA    Primary localized osteoarthritis of right hip M16.11    Status post total hip replacement, right Z96.641    Localized osteoarthrosis of left hip M16.12    Status post total replacement of left hip Z96.642    Unable to ambulate R26.2    Acute cystitis without hematuria N30.00    Hypothyroidism E03.9    HLD (hyperlipidemia) E78.5       Isolation/Infection:   Isolation            No Isolation          Patient Infection Status       None to display            Nurse Assessment:  Last Vital Signs: /77   Pulse 75   Temp 97.5 °F (36.4 °C) (Oral)   Resp 16   Ht 5' 4\" (1.626 m)   Wt 222 lb 14.2 oz (101.1 kg)   SpO2 97%   BMI 38.26 kg/m²     Last documented pain score (0-10 scale): Pain Level: 6  Last Weight:   Wt Readings from Last 1 Encounters:   07/15/21 222 lb 14.2 oz (101.1 kg)     Mental Status:  oriented and alert    IV Access:  - None    Nursing Mobility/ADLs:  Walking   Dependent  Transfer  Dependent  Bathing  Assisted  Dressing  Assisted  Toileting  Assisted  Feeding  Independent  Med Admin  Independent  Med Delivery   whole    Wound Care Documentation and Therapy:        Elimination:  Continence:   · Bowel: Yes  · Bladder: Yes  Urinary Catheter: None   Colostomy/Ileostomy/Ileal Conduit: No       Date of Last BM: 7/15/2021    Intake/Output Summary (Last 24 hours) at 7/16/2021 0901  Last data filed at 7/16/2021 0305  Gross per 24 hour   Intake 120 ml   Output --   Net 120 ml     I/O last 3 completed shifts: In: 120 [P.O.:120]  Out: -     Safety Concerns: At Risk for Falls    Impairments/Disabilities:      None    Nutrition Therapy:  Current Nutrition Therapy:   - Oral Diet:  General    Routes of Feeding: Oral  Liquids: No Restrictions  Daily Fluid Restriction: no  Last Modified Barium Swallow with Video (Video Swallowing Test): not done    Treatments at the Time of Hospital Discharge:   Respiratory Treatments: none  Oxygen Therapy:  is not on home oxygen therapy.   Ventilator:    - No ventilator support    Rehab Therapies: Physical Therapy and Occupational Therapy  Weight Bearing Status/Restrictions: No weight bearing restirctions  Other Medical Equipment (for information only, NOT a DME order):  walker  Other Treatments: none    Patient's personal belongings (please select all that are sent with patient):  Merlin, cell phone    RN SIGNATURE:  Electronically signed by Arnold Michele RN on 7/16/21 at 4:18 PM EDT    CASE MANAGEMENT/SOCIAL WORK SECTION    Inpatient Status Date: ***    Readmission Risk Assessment Score:  Readmission Risk              Risk of Unplanned Readmission:  9           Discharging to Facility/ Agency   [facility] [address]   [phone]     / signature: Electronically signed by Vinh Greenberg RN on 7/16/21 at 2:24 PM EDT    PHYSICIAN SECTION    Prognosis: Good    Condition at Discharge: Stable    Rehab Potential (if transferring to Rehab): Good    Recommended Labs or Other Treatments After Discharge: Follow up with PCP within 1-2 weeks. Physician Certification: I certify the above information and transfer of Micah Umaña  is necessary for the continuing treatment of the diagnosis listed and that she requires [facility] for less 30 days.      Update Admission H&P: No change in H&P    PHYSICIAN SIGNATURE:  Electronically signed by Tali Felton MD on 7/16/21 at 9:01 AM EDT

## 2021-07-16 NOTE — CARE COORDINATION
CASE MANAGEMENT DISCHARGE SUMMARY      Discharge to: [facility]    Precertification completed: yes  Hospital Exemption Notification (HENS) completed: yes    IMM given: (date) na    New Durable Medical Equipment ordered/agency: none    Transportation:    Family/car:   Medical Transport explained to [employer]. Pt/family voice no agency preference. [facility]   time:   Ambulance form completed: Yes    Confirmed discharge plan with:     Patient: yes     Family:  Yes she will notify sister     Facility/Agency, name:  [facility] faxed [phone]   Phone number for report to facility: [phone]     RN, name: Da clayton ordered needs resulted  Note: Discharging nurse to complete [facility], reconcile [facility], and place final copy with patient's discharge packet. RN to ensure that written prescriptions for  Level II medications are sent with patient to the facility as per protocol.     Kailee Payton RN

## 2021-07-16 NOTE — PROGRESS NOTES
Physical Therapy    Facility/Department: [facility] C3 TELE/MED SURG/ONC  Initial Assessment    NAME: Alexa Nj  : 1945  MRN: [id number]    Date of Service: 2021    Discharge Recommendations:  Subacute/Skilled Nursing Facility   PT Equipment Recommendations  Equipment Needed: No    Assessment   Body structures, Functions, Activity limitations: Decreased functional mobility ; Decreased sensation;Decreased balance;Decreased posture;Decreased strength;Decreased endurance;Decreased coordination  Assessment: Pt is 75 yo female who presents with diagnosis of inability to ambulate. Pt indep without device at baseline. States she gets shaky when she has UTIs but was able to ambulate to car yesterday with assist from friend and no device. Denies falls. Unable to ambulate this date d/t significant head, trunk and BLE ataxia with standing and taking minimal steps to chair. No tone or clonus noted. Pt requiring max A x 2 to maintain upright balance/posture with standing and use of RW. Bilateral knees buckling in standing. She states this has occured before when she had UTI. No ataxia, tremors, and coordination deficits noted when sitting EOB and donning brief or sitting in chair and completing UE and LE exercises. Pt would benefit from continued skilled therapy to address deficits. Recommend SNF at d/c due to current deficits. Treatment Diagnosis: impaired functional mobility  Specific instructions for Next Treatment: progress mobility as tolerated  Prognosis: Good  Decision Making: High Complexity  PT Education: Goals; General Safety;Gait Training;PT Role;Disease Specific Education;Plan of Care; Functional Mobility Training;Transfer Training  Disease Specific Education: Pt educated on importance of OOB mobility, prevention of complications of bedrest, and general safety during hospitalization.  Pt verbalized understanding  Barriers to Learning: none  REQUIRES PT FOLLOW UP: Yes  Activity Tolerance  Activity Tolerance: Patient Tolerated treatment well;Patient limited by fatigue;Patient limited by endurance; Patient limited by cognitive status       Patient Diagnosis(es): The primary encounter diagnosis was Inability to walk. Diagnoses of Unsteady gait when walking, Acute cystitis without hematuria, and Primary localized osteoarthritis of right hip were also pertinent to this visit. has a past medical history of Deep vein thrombosis (DVT) ([address] Degenerative disc disease, lumbar, HLD (hyperlipidemia), Hx of blood clots, Hypothyroidism, Localized osteoarthrosis of left hip, and Neuropathy. has a past surgical history that includes Hysterectomy; Total hip arthroplasty (Right, 10/9/2019); joint replacement; hip surgery; and Total hip arthroplasty (Left, 2/18/2020).     Restrictions  Restrictions/Precautions  Restrictions/Precautions: General Precautions, Fall Risk, Up as Tolerated  Vision/Hearing  Vision: Impaired  Vision Exceptions: Wears glasses at all times  Hearing: Exceptions to [facility]  Hearing Exceptions: Hard of hearing/hearing concerns     Subjective  General  Chart Reviewed: Yes  Patient assessed for rehabilitation services?: Yes  Response To Previous Treatment: Not applicable  Family / Caregiver Present: No  Referring Practitioner: Dr. Valentine Kenyon MD  Referral Date : 07/16/21  Diagnosis: Unable to ambulate  Follows Commands: Within Functional Limits  General Comment  Comments: Pt resting in bed on approach; RN cleared pt for therapy  Subjective  Subjective: pt agreeable to therapy  Pain Screening  Patient Currently in Pain: Yes  Pain Assessment  Pain Assessment: Faces  Nix-Baker Pain Rating: Hurts little more  Pain Type: Acute pain  Pain Location: Neck  Pain Orientation: Posterior  Non-Pharmaceutical Pain Intervention(s): Ambulation/Increased Activity;Repositioned  Response to Pain Intervention: Patient Satisfied  Vital Signs  Pulse: 72  BP: (!) 147/82  Patient Currently in Pain: Yes  Oxygen Therapy  SpO2: 96 %  O2 Device: None (Room air)       Orientation  Orientation  Overall Orientation Status: Within Functional Limits  Social/Functional History  Social/Functional History  Lives With: Family (sister)  Type of Home: House  Home Layout: Two level, Able to Live on Main level with bedroom/bathroom (stair lift to second floor)  Home Access: Stairs to enter with rails  Entrance Stairs - Number of Steps: 4  Entrance Stairs - Rails: Both  Bathroom Shower/Tub: Tub/Shower unit  Bathroom Toilet: Standard  Bathroom Equipment: Toilet raiser, Tub transfer bench, Grab bars in shower  Home Equipment: [employer] beach, 4 wheeled walker, Rolling walker  ADL Assistance: [address]: [address] [place] [address] Responsibilities: Yes  Ambulation Assistance: Independent (no device)  Transfer Assistance: Independent  Active : No  Patient's  Info: family or frients  Leisure & Hobbies: go out to eat  Additional Comments: denies falls    Objective     AROM RLE (degrees)  RLE AROM: WFL  AROM LLE (degrees)  LLE AROM : WFL  Strength RLE  Comment: Ankle DF/PF 5/5; knee ext 4/5; hip flexion 3+/5 -- limited d/t h/o LAUREEN  Strength LLE  Comment: Ankle DF/PF 5/5; knee ext 4/5; hip flexion 3+/5 -- limited d/t h/o LAUREEN  Tone RLE  RLE Tone: Normotonic  Tone LLE  LLE Tone: Normotonic  Coordination  Rapid Alternating Movements: Normal (sitting alternating toe taps)  Sensation  Overall Sensation Status: Impaired (baseline numbness bilateral hands and feet)     Bed mobility  Supine to Sit: Moderate assistance (HOB elevated, use of rails, cues for technique)  Sit to Supine:  (pt up in chair at end of session)     Transfers  Sit to Stand: Maximum Assistance;2 Person Assistance (from EOB and chair x 2 reps)  Stand to sit: 2 Person Assistance;Maximum Assistance     Ambulation  Ambulation?: Yes  Ambulation 1  Surface: level tile  Device: Rolling Walker  Assistance: 2 Person assistance;Maximum assistance  Quality of Gait: Max A x 2 d/t poor balance, trunk and head ataxia, BLE ataxia with significant coordination deficits. Max A for balance/posture and to negotiate RW. BLE buckling with assist x 2 to maintain balance. Gait Deviations: Shuffles; Slow Lorna;Decreased step length;Decreased step height;Decreased arm swing;Decreased head and trunk rotation;Deviated path;Staggers  Distance: 2 ft to chair     Balance  Sitting - Static: Good  Sitting - Dynamic: Good  Standing - Static: Poor  Standing - Dynamic: Poor  Comments: supervision to sit EOB  with no ataxia noted     Exercises  Hip Flexion: seated marches x 10 BLE -- limited ROM d/t pain from prior THAs  Knee Long Arc Quad: x 10 BLE  Ankle Pumps: x 15 BLE  Comments: Cues for technique     Plan   Plan  Times per week: 3-5x/wk  Times per day: Daily  Specific instructions for Next Treatment: progress mobility as tolerated  Current Treatment Recommendations: Strengthening, Neuromuscular Re-education, Safety Education & Training, Balance Training, Endurance Training, Functional Mobility Training, Transfer Training, Gait Training, Equipment Evaluation, Education, & procurement, Patient/Caregiver Education & Training  Safety Devices  Type of devices: All fall risk precautions in place, Call light within reach, Gait belt, Patient at risk for falls, Chair alarm in place, Nurse notified, Left in chair                        AM-PAC Score  AM-PAC Inpatient Mobility Raw Score : 11 (07/16/21 1018)  AM-PAC Inpatient T-Scale Score : 33.86 (07/16/21 1018)  Mobility Inpatient CMS 0-100% Score: 72.57 (07/16/21 1018)  Mobility Inpatient CMS G-Code Modifier : CL (07/16/21 1018)          Goals  Short term goals  Time Frame for Short term goals: 1 week (7/23) unless otherwise specified  Short term goal 1: Pt will be CGA for supine<>sit. Short term goal 2: Pt will be mod A for transfers with RW. Short term goal 3: Pt will ambulate 15 ft with mod A x 2 and RW.   Short term goal 4: 7/20: Pt will participate in 12-15 reps of BLE exercises to promote strength and activity tolerance. Patient Goals   Patient goals : \"to go to rehab and get better\"       Therapy Time   Individual Concurrent Group Co-treatment   Time In 0935         Time Out 1010         Minutes 35         Timed Code Treatment Minutes: 72 Jarad Vieira, PT, DPT  If pt is unable to be seen after this session, please let this note serve as discharge summary. Please see case management note for discharge disposition. Thank you.

## 2021-07-16 NOTE — PROGRESS NOTES
Perfect serve message sent to Breanne November NP, \"Pt would like DNR CC ordered, she says that she has the paperwork at home but not with her. Can we also try something to help her sleep? melatonin? \", awaiting response.  [id number]    See new orders

## 2021-07-16 NOTE — DISCHARGE SUMMARY
Hospital Medicine Discharge Summary    Patient ID: Brady Ibarra      Patient's PCP: Mila Vicente, JOE - CNP    Admit Date: 7/15/2021     Discharge Date:   07/16/21     Admitting Physician: Janee Ibrahim MD     Discharge Physician: Sudheer Bob MD     Discharge Diagnoses: Active Hospital Problems    Diagnosis     Hypothyroidism [E03.9]     HLD (hyperlipidemia) [E78.5]     Unable to ambulate [R26.2]     Acute cystitis without hematuria [N30.00]        The patient was seen and examined on day of discharge and this discharge summary is in conjunction with any daily progress note from day of discharge. Hospital Course:  76 Y F with a h/o HLD, hypothyroidism, and multiple orthopedic joint surgeries presented from home because she had become so diffusely weak that she couldn't ambulate very well. In the ED she was found to have pyuria. When asked she did endorse dysuria and suprapubic pain. She was admitted to hospital medicine overnight and she would like to go to rehab at [facility]. She has been started on empiric ceftriaxone for acute cystitis and I will provide PO cefdinir upon discharge. I will follow up the urine culture result as outpatient. - addendum: patient was quite wobbly with PT/OT. She is certain that she always gets like this when she has UTI's but we will check an MRI brain just to be sure she didn't have a cerebellar stroke. Addendum again: MRI reassuring. Otherwise, continue her same medications for hypothyroidism and HLD. Physical Exam Performed:     /77   Pulse 75   Temp 97.5 °F (36.4 °C) (Oral)   Resp 16   Ht 5' 4\" (1.626 m)   Wt 222 lb 14.2 oz (101.1 kg)   SpO2 97%   BMI 38.26 kg/m²       General appearance:  No apparent distress, appears stated age and cooperative. HEENT:  Normal cephalic, atraumatic without obvious deformity. Pupils equal, round, and reactive to light. Extra ocular muscles intact. Conjunctivae/corneas clear.   Neck: Supple, with full range of motion. No jugular venous distention. Trachea midline. Respiratory:  Normal respiratory effort. Clear to auscultation, bilaterally without Rales/Wheezes/Rhonchi. Cardiovascular:  Regular rate and rhythm with normal S1/S2 without murmurs, rubs or gallops. Abdomen: Soft, non-tender, non-distended with normal bowel sounds. Musculoskeletal:  No clubbing, cyanosis or edema bilaterally. Full range of motion without deformity. Skin: Skin color, texture, turgor normal.  No rashes or lesions. Neurologic:  Neurovascularly intact without any focal sensory/motor deficits. Cranial nerves: II-XII intact, grossly non-focal.  Psychiatric:  Alert and oriented, thought content appropriate, normal insight. anxious. Capillary Refill: Brisk,< 3 seconds   Peripheral Pulses: +2 palpable, equal bilaterally       Labs: For convenience and continuity at follow-up the following most recent labs are provided:      CBC:    Lab Results   Component Value Date    WBC 10.0 07/15/2021    HGB 13.4 07/15/2021    HCT 38.6 07/15/2021     07/15/2021       Renal:    Lab Results   Component Value Date     07/15/2021    K 3.9 07/15/2021     07/15/2021    CO2 28 07/15/2021    BUN 18 07/15/2021    CREATININE 0.9 07/15/2021    CALCIUM 9.8 07/15/2021         Significant Diagnostic Studies    Radiology:   CT CHEST PULMONARY EMBOLISM W CONTRAST   Final Result   Study is not optimized for evaluation of pulmonary arteries. No central   filling defects are noted the possible distal pulmonary emboli cannot be   totally excluded on this study. No evidence for right ventricular strain. No acute infiltrates or effusions. CT ABDOMEN PELVIS W IV CONTRAST Additional Contrast? None   Final Result   No acute findings within the abdomen or pelvis. Colonic diverticulosis with   no acute features. No evidence of appendicitis.          CT Head WO Contrast   Final Result   No acute intracranial abnormality. Consults:     IP CONSULT TO HOSPITALIST  IP CONSULT TO CASE MANAGEMENT    Disposition:  SNF     Condition at Discharge: Stable    Discharge Instructions/Follow-up:  Follow up with PCP within 1-2 weeks. Code Status:  DNR-CC     Activity: activity as tolerated    Diet: regular diet      Discharge Medications:     Current Discharge Medication List           Details   cefdinir (OMNICEF) 300 MG capsule Take 1 capsule by mouth 2 times daily for 9 days  Qty: 18 capsule, Refills: 0              Details   cyclobenzaprine (FLEXERIL) 10 MG tablet Take 1 tablet by mouth 3 times daily as needed for Muscle spasms  Qty: 20 tablet, Refills: 0    Associated Diagnoses: Primary localized osteoarthritis of right hip              Details   atorvastatin (LIPITOR) 20 MG tablet Take 20 mg by mouth daily      levothyroxine (SYNTHROID) 25 MCG tablet Take 25 mcg by mouth Daily      acetaminophen (TYLENOL) 325 MG tablet Take 650 mg by mouth every 6 hours as needed for Pain      multivitamin-iron-minerals-folic acid (CENTRUM) chewable tablet Take 1 tablet by mouth daily               Time Spent on discharge is more than 30 minutes in the examination, evaluation, counseling and review of medications and discharge plan. Signed:    Daniel March MD   7/16/2021      Thank you JOE Farley CNP for the opportunity to be involved in this patient's care. If you have any questions or concerns please feel free to contact me at [phone].

## 2021-07-16 NOTE — PROGRESS NOTES
4 Eyes Skin Assessment     The patient is being assess for  Admission    I agree that 2 RN's have performed a thorough Head to Toe Skin Assessment on the patient. ALL assessment sites listed below have been assessed. Areas assessed by both nurses: Jairo Torres RN and Maddie Vargas RN  [x]   Head, Face, and Ears   [x]   Shoulders, Back, and Chest  [x]   Arms, Elbows, and Hands   [x]   Coccyx, Sacrum, and IschIum  [x]   Legs, Feet, and Heels        Does the Patient have Skin Breakdown?   No         Sebastian Prevention initiated:  Yes  Wound Care Orders initiated:  NA      [facility] nurse consulted for Pressure Injury (Stage 3,4, Unstageable, DTI, NWPT, and Complex wounds), New and Established Ostomies:  NA      Nurse 1 eSignature: Electronically signed by Lawanda Drake RN on 7/15/21 at 10:30 PM EDT    **SHARE this note so that the co-signing nurse is able to place an eSignature**    Nurse 2 eSignature: Electronically signed by Virgilio De Dios RN on 7/16/21 at 3:51 AM EDT

## 2021-07-16 NOTE — H&P
Hospital Medicine History & Physical      PCP: JOE Fleming - CNP    Date of Admission: 7/15/2021    Date of Service: Pt seen/examined on 7/15/2021 and Admitted to Inpatient with expected LOS greater than two midnights due to medical therapy. Chief Complaint: Lower abdominal pain, difficulty walking, bilateral leg pain      History Of Present Illness:   76 y.o. female who presented to [address] [place] [address] with with a multitude of complaints. Patient reports lower abdominal pain, low back pain since the past few days intermittent. She does report increased frequency of urination. She is concerned that she may have a UTI, was supposed to go to the urologist office to give a urine sample. Reports some nausea but no vomiting. Reports her urine has been dark with some blood in it. Patient endorses fevers and chills. Patient reports that she is having trouble ambulating, feels like her legs are very weak and they also hurt. Denies any falls. Past Medical History:          Diagnosis Date    Deep vein thrombosis (DVT) (HCC)     RLE    Degenerative disc disease, lumbar     HLD (hyperlipidemia) 7/15/2021    Hx of blood clots     Hypothyroidism 7/15/2021    Localized osteoarthrosis of left hip 2/18/2020    Neuropathy        Past Surgical History:          Procedure Laterality Date    HIP SURGERY      HYSTERECTOMY      JOINT REPLACEMENT      TOTAL HIP ARTHROPLASTY Right 10/9/2019    RIGHT LATERAL TOTAL HIP MAKOPLASTY WITH CELLSAVER, [address] FOR PAIN CONTROL     ITZEL RUANO  CPT CODE - 44190, 48947 performed by Shaniqua Rock MD at [facility] Left 2/18/2020    LEFT LATERAL TOTAL  [facility]  CPT CODE - 40094, 44923 performed by Shaniqua Rock MD at [place] [address]       Medications Prior to Admission:      Prior to Admission medications    Medication Sig Start Date End Date Taking?  Authorizing Provider   atorvastatin (LIPITOR) 20 MG tablet Take 20 mg by mouth daily   Yes Historical Provider, MD   levothyroxine (SYNTHROID) 25 MCG tablet Take 25 mcg by mouth Daily   Yes Historical Provider, MD   cyclobenzaprine (FLEXERIL) 10 MG tablet Take 10 mg by mouth 3 times daily as needed for Muscle spasms    Historical Provider, MD   acetaminophen (TYLENOL) 325 MG tablet Take 650 mg by mouth every 6 hours as needed for Pain    Historical Provider, MD   multivitamin-iron-minerals-folic acid (CENTRUM) chewable tablet Take 1 tablet by mouth daily    Historical Provider, MD       Allergies:  Norco [hydrocodone-acetaminophen]    Social History:      The patient currently lives at home    TOBACCO:   reports that she has never smoked. She has never used smokeless tobacco.  ETOH:   reports no history of alcohol use. E-Cigarettes/Vaping Use     Questions Responses    E-Cigarette/Vaping Use Never User    Start Date     Passive Exposure     Quit Date     Counseling Given     Comments             Family History:  Positive as follows:        Problem Relation Age of Onset    Heart Disease Mother     Diabetes Father     Heart Disease Father        REVIEW OF SYSTEMS COMPLETED:   Pertinent positives as noted in the HPI. All other systems reviewed and negative. PHYSICAL EXAM PERFORMED:    BP (!) 161/82   Pulse 75   Temp 97.5 °F (36.4 °C) (Oral)   Resp 16   Ht 5' 4\" (1.626 m)   Wt 232 lb (105.2 kg)   SpO2 97%   BMI 39.82 kg/m²     General appearance:  No apparent distress, appears stated age and cooperative. HEENT:  Normal cephalic, atraumatic without obvious deformity. Pupils equal, round, and reactive to light. Extra ocular muscles intact. Conjunctivae/corneas clear. Neck: Supple, with full range of motion. No jugular venous distention. Trachea midline. Respiratory:  Normal respiratory effort. Clear to auscultation, bilaterally without Rales/Wheezes/Rhonchi.   Cardiovascular:  Regular rate and rhythm with normal S1/S2 without murmurs, rubs or gallops. Abdomen: Soft, mild suprapubic tenderness, non-distended with normal bowel sounds. Musculoskeletal:  No clubbing, cyanosis  bilaterally. Full range of motion without deformity. BLE edema 1+  Skin: Skin color, texture, turgor normal.  No rashes or lesions. Neurologic:  Neurovascularly intact without any focal sensory/motor deficits. Cranial nerves: II-XII intact, grossly non-focal.  Psychiatric:  Alert and oriented, thought content appropriate, normal insight  Capillary Refill: Brisk,3 seconds, normal  Peripheral Pulses: +2 palpable, equal bilaterally       Labs:     Recent Labs     07/15/21  1714   WBC 10.0   HGB 13.4   HCT 38.6        Recent Labs     07/15/21  1714      K 3.9      CO2 28   BUN 18   CREATININE 0.9   CALCIUM 9.8     Recent Labs     07/15/21  1714   AST 17   ALT 16   BILITOT 0.3   ALKPHOS 97     No results for input(s): INR in the last 72 hours. Recent Labs     07/15/21  1714   CKTOTAL 50   TROPONINI <0.01       Urinalysis:      Lab Results   Component Value Date    NITRU POSITIVE 07/15/2021    WBCUA 10-20 07/15/2021    BACTERIA 4+ 07/15/2021    RBCUA 0-2 07/15/2021    BLOODU Negative 07/15/2021    SPECGRAV 1.020 07/15/2021    GLUCOSEU Negative 07/15/2021       Radiology:     I personally reviewed the CT chest and CT abdomen pelvis, CT head and also reviewed radiologist report      CT CHEST PULMONARY EMBOLISM W CONTRAST   Final Result   Study is not optimized for evaluation of pulmonary arteries. No central   filling defects are noted the possible distal pulmonary emboli cannot be   totally excluded on this study. No evidence for right ventricular strain. No acute infiltrates or effusions. CT ABDOMEN PELVIS W IV CONTRAST Additional Contrast? None   Preliminary Result   No acute findings within the abdomen or pelvis. Colonic diverticulosis with   no acute features. No evidence of appendicitis.          CT Head WO Contrast   Final Result   No acute intracranial abnormality. ASSESSMENT:PLAN:     Unable to ambulate   Generalized weakness/debility  -PT OT consult  -Fall precautions  -Case management for placement    Acute cystitis without hematuria  Patient having clear symptoms. UA showing pyuria although not impressive, with bacteriuria and positive nitrite  -IV Rocephin daily  -Follow-up urine cultures and tailor antibiotics accordingly    Hypothyroidism-resume levothyroxine home dose    HLD-statin resumed    DVT Prophylaxis: Lovenox  Diet: ADULT DIET; Regular  Code Status: Full Code    PT/OT Eval Status: Ugo Cosby MD    Thank you JOE Trivedi - BEBE for the opportunity to be involved in this patient's care. If you have any questions or concerns please feel free to contact me at [phone].

## 2021-07-16 NOTE — CARE COORDINATION
CASE MANAGEMENT INITIAL ASSESSMENT      Reviewed chart and completed assessment with:patient  Explained Case Management role/services. Primary contact information:Critical access hospital Decision Maker :   Primary Decision Maker: Ella Quiroz - Brother/Sister - [phone]          Can this person be reached and be able to respond quickly, such as within a few minutes or hours? Yes      Admit date/status:7/15/21  Diagnosis:unable to ambulate   Is this a Readmission?:  No      Insurance:[facility]   Precert required for SNF: Yes       3 night stay required: No    Living arrangements, Adls, care needs, prior to admission:lives in 2 story home with sister    Transportation:tbd     Durable Medical Equipment at home:  Walker_xprn_Cane_xprn_RTS__ BSC__Shower Chair__  02__ HHN__ CPAP__  BiPap__  Hospital Bed__ W/C___ Other__________    Services in the home and/or outpatient, prior to [address] (if applicable)   · Name:  · Address:  · Dialysis Schedule:  · Phone:  · Fax:    PT/OT recs:pending    Hospital Exemption Notification (HEN):needed for SNF    Barriers to discharge:none    Plan/comments:spoke with patient. Reported from home with assistance of sister. Stated up till a week ago, was ambulatory with out any assistive devices. Now sated very weak and unable to ambulate. Therapy input pending. Discussed if therapy would rec SNF where she would want to go. Patient would like the [facility]. Referral made, following for therapy recs.  Ramona Loo RN      ECOC on chart for MD signature

## 2021-07-17 LAB
ORGANISM: ABNORMAL
URINE CULTURE, ROUTINE: ABNORMAL

## 2021-07-17 NOTE — ED PROVIDER NOTES
Attending Supervisory Note/Shared Visit   I have personally performed a face to face diagnostic evaluation on this patient. I have reviewed the mid-levels findings and agree. History and Exam by me shows a morbidly obese female in no acute distress. Patient presents the ED for evaluation of difficulties ambulating, myalgias and lightheadedness. Patient states that her symptoms have been ongoing for the past few days. She states that she has required admission before for similar presentation. Patient does have a history of previous hip arthroplasty requiring rehabilitation. Time evaluation the patient states that her symptoms are under control. She has no complaints while at rest.     On exam vital signs are within normal limits. Patient is a normal respiratory effort. She is regular rate and rhythm. Abdomen soft nontender distended. She has no focal logical deficits. Patient appears to be no acute distress. Patient initially seen by the advanced practice provider. Laboratory work-up remarkable for urinalysis that appears to be infected. Additional work-up did not show any emergent laboratory normalities. Attempts are made to admit the patient at bedside which was unable to do. Due to the patient's inability to perform her ADLs she would be admitted the hospital for further management and placement. I agree with the STEPHEN plan of care. Please STEPHEN Note for full ED course and final disposition. EKG shows a sinus rhythm ventricular to 76 bpm.  TX interval is prolonged and QTc interval is within normal limits. Patient has a normal axis. There are no sniffing ST elevations depressions EKG is nondiagnostic for ACS. No previous EKGs to compare to. Disposition:  1. Inability to walk    2. Unsteady gait when walking    3. Acute cystitis without hematuria    4.  Primary localized osteoarthritis of right hip          Jaylin Rliey MD  Attending Emergency Physician        Jaylin Riley

## 2021-08-15 PROBLEM — N39.0 UTI (URINARY TRACT INFECTION): Status: RESOLVED | Noted: 2021-07-16 | Resolved: 2021-08-15

## 2021-10-15 ENCOUNTER — CLINICAL DOCUMENTATION (OUTPATIENT)
Dept: OTHER | Age: 76
End: 2021-10-15

## 2021-12-06 ENCOUNTER — TELEPHONE (OUTPATIENT)
Dept: ORTHOPEDIC SURGERY | Age: 76
End: 2021-12-06

## 2021-12-06 NOTE — TELEPHONE ENCOUNTER
LVM for patient regarding the [address] [place] [address] Orthopedic joint pain program. Patient can call [phone] for more information or to schedule an appointment with a joint pain specialist.

## 2024-09-03 ENCOUNTER — APPOINTMENT (OUTPATIENT)
Dept: GENERAL RADIOLOGY | Age: 79
DRG: 639 | End: 2024-09-03
Payer: MEDICARE

## 2024-09-03 ENCOUNTER — HOSPITAL ENCOUNTER (INPATIENT)
Age: 79
LOS: 8 days | Discharge: HOME HEALTH | DRG: 639 | End: 2024-09-11
Attending: EMERGENCY MEDICINE | Admitting: INTERNAL MEDICINE
Payer: MEDICARE

## 2024-09-03 DIAGNOSIS — R07.9 CHEST PAIN: ICD-10-CM

## 2024-09-03 DIAGNOSIS — R06.02 SHORTNESS OF BREATH: ICD-10-CM

## 2024-09-03 DIAGNOSIS — I20.9 ANGINA PECTORIS (HCC): ICD-10-CM

## 2024-09-03 DIAGNOSIS — I24.9 ACUTE CORONARY SYNDROME (HCC): ICD-10-CM

## 2024-09-03 DIAGNOSIS — R07.9 CHEST PAIN, UNSPECIFIED TYPE: ICD-10-CM

## 2024-09-03 DIAGNOSIS — R79.89 ELEVATED TROPONIN: ICD-10-CM

## 2024-09-03 DIAGNOSIS — R03.0 ELEVATED BLOOD PRESSURE READING: ICD-10-CM

## 2024-09-03 DIAGNOSIS — N17.9 ACUTE KIDNEY INJURY (HCC): ICD-10-CM

## 2024-09-03 DIAGNOSIS — E11.65 UNCONTROLLED TYPE 2 DIABETES MELLITUS WITH HYPERGLYCEMIA (HCC): Primary | ICD-10-CM

## 2024-09-03 LAB
ALBUMIN SERPL-MCNC: 4.4 G/DL (ref 3.4–5)
ALBUMIN/GLOB SERPL: 1.6 {RATIO} (ref 1.1–2.2)
ALP SERPL-CCNC: 79 U/L (ref 40–129)
ALT SERPL-CCNC: 29 U/L (ref 10–40)
ANION GAP SERPL CALCULATED.3IONS-SCNC: 11 MMOL/L (ref 3–16)
AST SERPL-CCNC: 22 U/L (ref 15–37)
BACTERIA URNS QL MICRO: ABNORMAL /HPF
BASOPHILS # BLD: 0.1 K/UL (ref 0–0.2)
BASOPHILS NFR BLD: 1.4 %
BILIRUB SERPL-MCNC: 0.3 MG/DL (ref 0–1)
BILIRUB UR QL STRIP.AUTO: NEGATIVE
BUN SERPL-MCNC: 25 MG/DL (ref 7–20)
CALCIUM SERPL-MCNC: 9.7 MG/DL (ref 8.3–10.6)
CHLORIDE SERPL-SCNC: 95 MMOL/L (ref 99–110)
CLARITY UR: CLEAR
CO2 SERPL-SCNC: 23 MMOL/L (ref 21–32)
COLOR UR: YELLOW
CREAT SERPL-MCNC: 1.2 MG/DL (ref 0.6–1.2)
DEPRECATED RDW RBC AUTO: 13.4 % (ref 12.4–15.4)
EOSINOPHIL # BLD: 0.1 K/UL (ref 0–0.6)
EOSINOPHIL NFR BLD: 1 %
EPI CELLS #/AREA URNS HPF: ABNORMAL /HPF (ref 0–5)
GFR SERPLBLD CREATININE-BSD FMLA CKD-EPI: 46 ML/MIN/{1.73_M2}
GLUCOSE BLD-MCNC: 380 MG/DL (ref 70–99)
GLUCOSE BLD-MCNC: 388 MG/DL (ref 70–99)
GLUCOSE BLD-MCNC: 428 MG/DL (ref 70–99)
GLUCOSE BLD-MCNC: 442 MG/DL (ref 70–99)
GLUCOSE SERPL-MCNC: 484 MG/DL (ref 70–99)
GLUCOSE UR STRIP.AUTO-MCNC: >=1000 MG/DL
HCT VFR BLD AUTO: 34.7 % (ref 36–48)
HGB BLD-MCNC: 11.8 G/DL (ref 12–16)
HGB UR QL STRIP.AUTO: NEGATIVE
KETONES UR STRIP.AUTO-MCNC: NEGATIVE MG/DL
LEUKOCYTE ESTERASE UR QL STRIP.AUTO: ABNORMAL
LYMPHOCYTES # BLD: 2 K/UL (ref 1–5.1)
LYMPHOCYTES NFR BLD: 18.9 %
MAGNESIUM SERPL-MCNC: 1.8 MG/DL (ref 1.8–2.4)
MCH RBC QN AUTO: 32.8 PG (ref 26–34)
MCHC RBC AUTO-ENTMCNC: 34.1 G/DL (ref 31–36)
MCV RBC AUTO: 96.1 FL (ref 80–100)
MONOCYTES # BLD: 0.7 K/UL (ref 0–1.3)
MONOCYTES NFR BLD: 6.4 %
NEUTROPHILS # BLD: 7.5 K/UL (ref 1.7–7.7)
NEUTROPHILS NFR BLD: 72.3 %
NITRITE UR QL STRIP.AUTO: NEGATIVE
NT-PROBNP SERPL-MCNC: 93 PG/ML (ref 0–449)
PERFORMED ON: ABNORMAL
PH UR STRIP.AUTO: 5.5 [PH] (ref 5–8)
PLATELET # BLD AUTO: 193 K/UL (ref 135–450)
PMV BLD AUTO: 9 FL (ref 5–10.5)
POTASSIUM SERPL-SCNC: 4.8 MMOL/L (ref 3.5–5.1)
PROT SERPL-MCNC: 7.2 G/DL (ref 6.4–8.2)
PROT UR STRIP.AUTO-MCNC: NEGATIVE MG/DL
RBC # BLD AUTO: 3.61 M/UL (ref 4–5.2)
RBC #/AREA URNS HPF: ABNORMAL /HPF (ref 0–4)
RENAL EPI CELLS #/AREA UR COMP ASSIST: ABNORMAL /HPF (ref 0–1)
SODIUM SERPL-SCNC: 129 MMOL/L (ref 136–145)
SP GR UR STRIP.AUTO: 1.02 (ref 1–1.03)
TROPONIN, HIGH SENSITIVITY: 20 NG/L (ref 0–14)
TROPONIN, HIGH SENSITIVITY: 20 NG/L (ref 0–14)
TROPONIN, HIGH SENSITIVITY: 21 NG/L (ref 0–14)
UA COMPLETE W REFLEX CULTURE PNL UR: YES
UA DIPSTICK W REFLEX MICRO PNL UR: YES
URN SPEC COLLECT METH UR: ABNORMAL
UROBILINOGEN UR STRIP-ACNC: 0.2 E.U./DL
WBC # BLD AUTO: 10.4 K/UL (ref 4–11)
WBC #/AREA URNS HPF: ABNORMAL /HPF (ref 0–5)

## 2024-09-03 PROCEDURE — 93005 ELECTROCARDIOGRAM TRACING: CPT | Performed by: EMERGENCY MEDICINE

## 2024-09-03 PROCEDURE — 96375 TX/PRO/DX INJ NEW DRUG ADDON: CPT

## 2024-09-03 PROCEDURE — 2500000003 HC RX 250 WO HCPCS: Performed by: INTERNAL MEDICINE

## 2024-09-03 PROCEDURE — 96374 THER/PROPH/DIAG INJ IV PUSH: CPT

## 2024-09-03 PROCEDURE — 71045 X-RAY EXAM CHEST 1 VIEW: CPT

## 2024-09-03 PROCEDURE — 85025 COMPLETE CBC W/AUTO DIFF WBC: CPT

## 2024-09-03 PROCEDURE — 36415 COLL VENOUS BLD VENIPUNCTURE: CPT

## 2024-09-03 PROCEDURE — 83735 ASSAY OF MAGNESIUM: CPT

## 2024-09-03 PROCEDURE — 6370000000 HC RX 637 (ALT 250 FOR IP): Performed by: INTERNAL MEDICINE

## 2024-09-03 PROCEDURE — 2580000003 HC RX 258: Performed by: EMERGENCY MEDICINE

## 2024-09-03 PROCEDURE — 99285 EMERGENCY DEPT VISIT HI MDM: CPT

## 2024-09-03 PROCEDURE — 1200000000 HC SEMI PRIVATE

## 2024-09-03 PROCEDURE — 6360000002 HC RX W HCPCS: Performed by: INTERNAL MEDICINE

## 2024-09-03 PROCEDURE — 2580000003 HC RX 258: Performed by: INTERNAL MEDICINE

## 2024-09-03 PROCEDURE — 81001 URINALYSIS AUTO W/SCOPE: CPT

## 2024-09-03 PROCEDURE — 2500000003 HC RX 250 WO HCPCS: Performed by: EMERGENCY MEDICINE

## 2024-09-03 PROCEDURE — 87086 URINE CULTURE/COLONY COUNT: CPT

## 2024-09-03 PROCEDURE — 83880 ASSAY OF NATRIURETIC PEPTIDE: CPT

## 2024-09-03 PROCEDURE — 84484 ASSAY OF TROPONIN QUANT: CPT

## 2024-09-03 PROCEDURE — 80053 COMPREHEN METABOLIC PANEL: CPT

## 2024-09-03 PROCEDURE — 6370000000 HC RX 637 (ALT 250 FOR IP): Performed by: EMERGENCY MEDICINE

## 2024-09-03 RX ORDER — DEXTROSE MONOHYDRATE 100 MG/ML
INJECTION, SOLUTION INTRAVENOUS CONTINUOUS PRN
Status: DISCONTINUED | OUTPATIENT
Start: 2024-09-03 | End: 2024-09-11 | Stop reason: HOSPADM

## 2024-09-03 RX ORDER — SODIUM CHLORIDE 0.9 % (FLUSH) 0.9 %
5-40 SYRINGE (ML) INJECTION EVERY 12 HOURS SCHEDULED
Status: DISCONTINUED | OUTPATIENT
Start: 2024-09-03 | End: 2024-09-11 | Stop reason: HOSPADM

## 2024-09-03 RX ORDER — DIPHENHYDRAMINE HCL 25 MG
25 CAPSULE ORAL EVERY 6 HOURS PRN
COMMUNITY

## 2024-09-03 RX ORDER — ONDANSETRON 2 MG/ML
4 INJECTION INTRAMUSCULAR; INTRAVENOUS EVERY 6 HOURS PRN
Status: DISCONTINUED | OUTPATIENT
Start: 2024-09-03 | End: 2024-09-11 | Stop reason: HOSPADM

## 2024-09-03 RX ORDER — POTASSIUM CHLORIDE 7.45 MG/ML
10 INJECTION INTRAVENOUS PRN
Status: DISCONTINUED | OUTPATIENT
Start: 2024-09-03 | End: 2024-09-04

## 2024-09-03 RX ORDER — INSULIN LISPRO 100 [IU]/ML
0-4 INJECTION, SOLUTION INTRAVENOUS; SUBCUTANEOUS
Status: DISCONTINUED | OUTPATIENT
Start: 2024-09-04 | End: 2024-09-06

## 2024-09-03 RX ORDER — LEVOTHYROXINE SODIUM 25 UG/1
25 TABLET ORAL DAILY
Status: DISCONTINUED | OUTPATIENT
Start: 2024-09-03 | End: 2024-09-11 | Stop reason: HOSPADM

## 2024-09-03 RX ORDER — SODIUM CHLORIDE, SODIUM LACTATE, POTASSIUM CHLORIDE, CALCIUM CHLORIDE 600; 310; 30; 20 MG/100ML; MG/100ML; MG/100ML; MG/100ML
INJECTION, SOLUTION INTRAVENOUS CONTINUOUS
Status: DISCONTINUED | OUTPATIENT
Start: 2024-09-03 | End: 2024-09-04

## 2024-09-03 RX ORDER — CETIRIZINE HYDROCHLORIDE 5 MG/1
5 TABLET ORAL DAILY
COMMUNITY

## 2024-09-03 RX ORDER — ATORVASTATIN CALCIUM 40 MG/1
40 TABLET, FILM COATED ORAL NIGHTLY
Status: DISCONTINUED | OUTPATIENT
Start: 2024-09-03 | End: 2024-09-09

## 2024-09-03 RX ORDER — INSULIN GLARGINE 100 [IU]/ML
10 INJECTION, SOLUTION SUBCUTANEOUS NIGHTLY
Status: DISCONTINUED | OUTPATIENT
Start: 2024-09-03 | End: 2024-09-04

## 2024-09-03 RX ORDER — LISINOPRIL 20 MG/1
20 TABLET ORAL DAILY
COMMUNITY

## 2024-09-03 RX ORDER — ACETAMINOPHEN 325 MG/1
650 TABLET ORAL EVERY 6 HOURS PRN
Status: DISCONTINUED | OUTPATIENT
Start: 2024-09-03 | End: 2024-09-11 | Stop reason: HOSPADM

## 2024-09-03 RX ORDER — 0.9 % SODIUM CHLORIDE 0.9 %
1000 INTRAVENOUS SOLUTION INTRAVENOUS ONCE
Status: COMPLETED | OUTPATIENT
Start: 2024-09-03 | End: 2024-09-03

## 2024-09-03 RX ORDER — ONDANSETRON 4 MG/1
4 TABLET, ORALLY DISINTEGRATING ORAL EVERY 8 HOURS PRN
Status: DISCONTINUED | OUTPATIENT
Start: 2024-09-03 | End: 2024-09-11 | Stop reason: HOSPADM

## 2024-09-03 RX ORDER — POTASSIUM CHLORIDE 1500 MG/1
40 TABLET, EXTENDED RELEASE ORAL PRN
Status: DISCONTINUED | OUTPATIENT
Start: 2024-09-03 | End: 2024-09-04

## 2024-09-03 RX ORDER — ENOXAPARIN SODIUM 150 MG/ML
1 INJECTION SUBCUTANEOUS 2 TIMES DAILY
Status: DISCONTINUED | OUTPATIENT
Start: 2024-09-03 | End: 2024-09-05

## 2024-09-03 RX ORDER — SODIUM CHLORIDE 0.9 % (FLUSH) 0.9 %
5-40 SYRINGE (ML) INJECTION PRN
Status: DISCONTINUED | OUTPATIENT
Start: 2024-09-03 | End: 2024-09-11 | Stop reason: HOSPADM

## 2024-09-03 RX ORDER — CYCLOBENZAPRINE HCL 10 MG
10 TABLET ORAL 3 TIMES DAILY PRN
Status: DISCONTINUED | OUTPATIENT
Start: 2024-09-03 | End: 2024-09-11 | Stop reason: HOSPADM

## 2024-09-03 RX ORDER — MAGNESIUM SULFATE IN WATER 40 MG/ML
2000 INJECTION, SOLUTION INTRAVENOUS PRN
Status: DISCONTINUED | OUTPATIENT
Start: 2024-09-03 | End: 2024-09-04

## 2024-09-03 RX ORDER — INSULIN LISPRO 100 [IU]/ML
0-4 INJECTION, SOLUTION INTRAVENOUS; SUBCUTANEOUS NIGHTLY
Status: DISCONTINUED | OUTPATIENT
Start: 2024-09-03 | End: 2024-09-06

## 2024-09-03 RX ORDER — POLYETHYLENE GLYCOL 3350 17 G/17G
17 POWDER, FOR SOLUTION ORAL DAILY PRN
Status: DISCONTINUED | OUTPATIENT
Start: 2024-09-03 | End: 2024-09-11 | Stop reason: HOSPADM

## 2024-09-03 RX ORDER — LISINOPRIL 20 MG/1
20 TABLET ORAL DAILY
Status: DISCONTINUED | OUTPATIENT
Start: 2024-09-03 | End: 2024-09-11 | Stop reason: HOSPADM

## 2024-09-03 RX ORDER — SODIUM CHLORIDE 9 MG/ML
INJECTION, SOLUTION INTRAVENOUS PRN
Status: DISCONTINUED | OUTPATIENT
Start: 2024-09-03 | End: 2024-09-11 | Stop reason: HOSPADM

## 2024-09-03 RX ORDER — NITROGLYCERIN 0.4 MG/1
0.4 TABLET SUBLINGUAL EVERY 5 MIN PRN
Status: DISCONTINUED | OUTPATIENT
Start: 2024-09-03 | End: 2024-09-11 | Stop reason: HOSPADM

## 2024-09-03 RX ORDER — ASPIRIN 81 MG/1
81 TABLET, CHEWABLE ORAL DAILY
Status: DISCONTINUED | OUTPATIENT
Start: 2024-09-04 | End: 2024-09-11 | Stop reason: HOSPADM

## 2024-09-03 RX ORDER — ACETAMINOPHEN 650 MG/1
650 SUPPOSITORY RECTAL EVERY 6 HOURS PRN
Status: DISCONTINUED | OUTPATIENT
Start: 2024-09-03 | End: 2024-09-11 | Stop reason: HOSPADM

## 2024-09-03 RX ORDER — NICOTINE 21 MG/24HR
1 PATCH, TRANSDERMAL 24 HOURS TRANSDERMAL DAILY
Status: DISCONTINUED | OUTPATIENT
Start: 2024-09-03 | End: 2024-09-05

## 2024-09-03 RX ORDER — GLUCAGON 1 MG/ML
1 KIT INJECTION PRN
Status: DISCONTINUED | OUTPATIENT
Start: 2024-09-03 | End: 2024-09-11 | Stop reason: HOSPADM

## 2024-09-03 RX ADMIN — INSULIN LISPRO 4 UNITS: 100 INJECTION, SOLUTION INTRAVENOUS; SUBCUTANEOUS at 21:45

## 2024-09-03 RX ADMIN — INSULIN GLARGINE 10 UNITS: 100 INJECTION, SOLUTION SUBCUTANEOUS at 21:46

## 2024-09-03 RX ADMIN — FAMOTIDINE 20 MG: 10 INJECTION, SOLUTION INTRAVENOUS at 21:50

## 2024-09-03 RX ADMIN — ATORVASTATIN CALCIUM 40 MG: 40 TABLET, FILM COATED ORAL at 21:49

## 2024-09-03 RX ADMIN — LEVOTHYROXINE SODIUM 25 MCG: 0.03 TABLET ORAL at 21:49

## 2024-09-03 RX ADMIN — ENOXAPARIN SODIUM 105 MG: 150 INJECTION SUBCUTANEOUS at 21:46

## 2024-09-03 RX ADMIN — INSULIN HUMAN 4 UNITS: 100 INJECTION, SOLUTION PARENTERAL at 18:09

## 2024-09-03 RX ADMIN — FAMOTIDINE 20 MG: 10 INJECTION, SOLUTION INTRAVENOUS at 18:09

## 2024-09-03 RX ADMIN — SODIUM CHLORIDE, POTASSIUM CHLORIDE, SODIUM LACTATE AND CALCIUM CHLORIDE: 600; 310; 30; 20 INJECTION, SOLUTION INTRAVENOUS at 20:22

## 2024-09-03 RX ADMIN — INSULIN HUMAN 6 UNITS: 100 INJECTION, SOLUTION PARENTERAL at 20:22

## 2024-09-03 RX ADMIN — SODIUM CHLORIDE 1000 ML: 9 INJECTION, SOLUTION INTRAVENOUS at 18:08

## 2024-09-03 ASSESSMENT — PAIN - FUNCTIONAL ASSESSMENT: PAIN_FUNCTIONAL_ASSESSMENT: NONE - DENIES PAIN

## 2024-09-03 ASSESSMENT — LIFESTYLE VARIABLES
HOW MANY STANDARD DRINKS CONTAINING ALCOHOL DO YOU HAVE ON A TYPICAL DAY: PATIENT DOES NOT DRINK
HOW OFTEN DO YOU HAVE A DRINK CONTAINING ALCOHOL: NEVER

## 2024-09-03 NOTE — H&P
Hospital Medicine History & Physical      PCP: Vicki Berg APRN - CNP    Date of Admission: 9/3/2024    Date of Service: Pt seen/examined and Admitted with expected LOS greater than two midnights due to medical therapy.     Chief Complaint:      Chest pain, significantly elevated blood sugar elevated    History Of Present Illness:    This is a 78-year-old female who was brought to the emergency room due to worsening of shortness of breath which is new to her.  Patient also noticed to have elevated blood sugar levels greater than 500 today.    Upon further questioning patient states that she has been on Trulicity which was discontinued by her primary care physician 3 weeks ago and did not have any other replacement for blood sugar control.    Patient remains alert awake oriented x 3 answers all questions appropriately  Patient denies any palpitation orthopnea no loss of consciousness no diaphoresis.  Patient denies having any recent chills fever no nausea vomiting abdominal pain no diarrhea constipation no muscle weakness paralysis no loss of sensation.    The workup in the emergency room notable for:      Patient's past medical history significant for:  Past Medical History:          Diagnosis Date    Deep vein thrombosis (DVT) (HCC)     RLE    Degenerative disc disease, lumbar     Diabetes mellitus (HCC)     HLD (hyperlipidemia) 07/15/2021    Hx of blood clots     Hypothyroidism 07/15/2021    Localized osteoarthrosis of left hip 02/18/2020    Neuropathy          Past Surgical History:          Procedure Laterality Date    HIP SURGERY      HYSTERECTOMY (CERVIX STATUS UNKNOWN)      JOINT REPLACEMENT      THYROIDECTOMY, PARTIAL Right     TOTAL HIP ARTHROPLASTY Right 10/09/2019    RIGHT LATERAL TOTAL HIP MAKOPLASTY WITH CELLSAVER, FASCIAILIACA BLOCK FOR PAIN CONTROL     ITZEL RUANO  CPT CODE - 12452, 82602 performed by Ajit Rodriguez MD at [facility] OR    TOTAL HIP ARTHROPLASTY Left 02/18/2020    LEFT LATERAL TOTAL

## 2024-09-03 NOTE — ED PROVIDER NOTES
EMERGENCY DEPARTMENT ENCOUNTER        Pt Name: Gail Stewart  MRN: [id number]  Birthdate 1945  Date of evaluation: 9/3/2024  Provider: Estuardo Sotelo MD  PCP: Vicki Berg APRN - CNP      CHIEF COMPLAINT       Chief Complaint   Patient presents with    elevated glucose     Patient reports she was sent in today for an elevated glucose.  States her MD stopped her Trulicity due to illness.  MD checked blood work today, called her and told her to come to the emergency department.         HISTORY OFPRESENT ILLNESS   (Location/Symptom, Timing/Onset, Context/Setting, Quality, Duration, Modifying Factors,Severity)  Note limiting factors.     Gail Stewart is a 78 y.o. female presenting today due to concern for having some blood work checked today and being told that her glucose was too high and that she needed to go to the emergency department.  She had been on Trulicity and started this last year and reported initially doing okay with that but over the last few months she started not feeling well and ultimately was told to stop taking this about 3 weeks ago.  She does report having some belching associated with some chest discomfort and shortness of breath that has been going on for the last 3 days.  She denies any lightheadedness or dizziness.  She denies any numbness or weakness in the arms or legs.  She denies any actual abdominal pain or vomiting.  She denies any urinary complaints other than having some frequency.  No dysuria.  No fever.  No falls or trauma.  Due to concern for elevated glucose, her primary doctor called her and told her to go to the emergency department for further evaluation.  She reports that she feels fine and wants to go home.         REVIEW OF SYSTEMS    (2-9 systems for level 4, 10 or more for level 5)     Review of Systems   Constitutional:  Negative for chills and fever.   Eyes:  Negative for pain and visual disturbance.   Respiratory:  Positive for shortness of breath.

## 2024-09-04 ENCOUNTER — APPOINTMENT (OUTPATIENT)
Age: 79
DRG: 639 | End: 2024-09-04
Attending: INTERNAL MEDICINE
Payer: MEDICARE

## 2024-09-04 LAB
ALBUMIN SERPL-MCNC: 3.7 G/DL (ref 3.4–5)
ALBUMIN/GLOB SERPL: 1.6 {RATIO} (ref 1.1–2.2)
ALP SERPL-CCNC: 65 U/L (ref 40–129)
ALT SERPL-CCNC: 25 U/L (ref 10–40)
ANION GAP SERPL CALCULATED.3IONS-SCNC: 11 MMOL/L (ref 3–16)
AST SERPL-CCNC: 23 U/L (ref 15–37)
BACTERIA UR CULT: NORMAL
BASOPHILS # BLD: 0.1 K/UL (ref 0–0.2)
BASOPHILS NFR BLD: 0.9 %
BILIRUB SERPL-MCNC: 0.3 MG/DL (ref 0–1)
BUN SERPL-MCNC: 17 MG/DL (ref 7–20)
CALCIUM SERPL-MCNC: 9.2 MG/DL (ref 8.3–10.6)
CHLORIDE SERPL-SCNC: 103 MMOL/L (ref 99–110)
CHOLEST SERPL-MCNC: 154 MG/DL (ref 0–199)
CO2 SERPL-SCNC: 23 MMOL/L (ref 21–32)
CREAT SERPL-MCNC: 0.9 MG/DL (ref 0.6–1.2)
DEPRECATED RDW RBC AUTO: 13.2 % (ref 12.4–15.4)
ECHO AO ASC DIAM: 3.1 CM
ECHO AO ASCENDING AORTA INDEX: 1.5 CM/M2
ECHO AO ROOT DIAM: 3.4 CM
ECHO AO ROOT INDEX: 1.64 CM/M2
ECHO AV AREA PEAK VELOCITY: 2.1 CM2
ECHO AV AREA VTI: 2 CM2
ECHO AV AREA/BSA PEAK VELOCITY: 1 CM2/M2
ECHO AV AREA/BSA VTI: 1 CM2/M2
ECHO AV CUSP MM: 2 CM
ECHO AV MEAN GRADIENT: 8 MMHG
ECHO AV MEAN VELOCITY: 1.3 M/S
ECHO AV PEAK GRADIENT: 14 MMHG
ECHO AV PEAK VELOCITY: 1.9 M/S
ECHO AV VELOCITY RATIO: 0.68
ECHO AV VTI: 35.5 CM
ECHO BSA: 2.16 M2
ECHO EST RA PRESSURE: 3 MMHG
ECHO LA AREA 2C: 17.8 CM2
ECHO LA AREA 4C: 17.7 CM2
ECHO LA MAJOR AXIS: 5.1 CM
ECHO LA MINOR AXIS: 5 CM
ECHO LA VOL BP: 50 ML (ref 22–52)
ECHO LA VOL MOD A2C: 53 ML (ref 22–52)
ECHO LA VOL MOD A4C: 46 ML (ref 22–52)
ECHO LA VOL/BSA BIPLANE: 24 ML/M2 (ref 16–34)
ECHO LA VOLUME INDEX MOD A2C: 26 ML/M2 (ref 16–34)
ECHO LA VOLUME INDEX MOD A4C: 22 ML/M2 (ref 16–34)
ECHO LV E' LATERAL VELOCITY: 10 CM/S
ECHO LV E' SEPTAL VELOCITY: 8 CM/S
ECHO LV EDV 3D: 175 ML
ECHO LV EDV INDEX 3D: 85 ML/M2
ECHO LV EJECTION FRACTION 3D: 52 %
ECHO LV ESV 3D: 83 ML
ECHO LV ESV INDEX 3D: 40 ML/M2
ECHO LV FRACTIONAL SHORTENING: 37 % (ref 28–44)
ECHO LV INTERNAL DIMENSION DIASTOLE INDEX: 2.51 CM/M2
ECHO LV INTERNAL DIMENSION DIASTOLIC: 5.2 CM (ref 3.9–5.3)
ECHO LV INTERNAL DIMENSION SYSTOLIC INDEX: 1.59 CM/M2
ECHO LV INTERNAL DIMENSION SYSTOLIC: 3.3 CM
ECHO LV IVSD: 0.8 CM (ref 0.6–0.9)
ECHO LV MASS 2D: 145.2 G (ref 67–162)
ECHO LV MASS 3D INDEX: 104.3 G/M2
ECHO LV MASS 3D: 216 G
ECHO LV MASS INDEX 2D: 70.2 G/M2 (ref 43–95)
ECHO LV POSTERIOR WALL DIASTOLIC: 0.8 CM (ref 0.6–0.9)
ECHO LV RELATIVE WALL THICKNESS RATIO: 0.31
ECHO LVOT AREA: 3.1 CM2
ECHO LVOT AV VTI INDEX: 0.64
ECHO LVOT DIAM: 2 CM
ECHO LVOT MEAN GRADIENT: 3 MMHG
ECHO LVOT PEAK GRADIENT: 6 MMHG
ECHO LVOT PEAK VELOCITY: 1.3 M/S
ECHO LVOT STROKE VOLUME INDEX: 34.3 ML/M2
ECHO LVOT SV: 71 ML
ECHO LVOT VTI: 22.6 CM
ECHO MV A VELOCITY: 1.21 M/S
ECHO MV E DECELERATION TIME (DT): 171 MS
ECHO MV E VELOCITY: 0.87 M/S
ECHO MV E/A RATIO: 0.72
ECHO MV E/E' LATERAL: 8.7
ECHO MV E/E' RATIO (AVERAGED): 9.79
ECHO MV E/E' SEPTAL: 10.88
ECHO RA AREA 4C: 11.5 CM2
ECHO RA END SYSTOLIC VOLUME APICAL 4 CHAMBER INDEX BSA: 13 ML/M2
ECHO RA VOLUME: 26 ML
ECHO RIGHT VENTRICULAR SYSTOLIC PRESSURE (RVSP): 8 MMHG
ECHO RV EJECTION FRACTION 3D: 40 %
ECHO RV FRACTIONAL AREA CHANGE: 38 %
ECHO RV FREE WALL PEAK S': 14 CM/S
ECHO RV TAPSE: 2.3 CM (ref 1.7–?)
ECHO TV REGURGITANT MAX VELOCITY: 1.16 M/S
ECHO TV REGURGITANT PEAK GRADIENT: 5 MMHG
EKG ATRIAL RATE: 79 BPM
EKG DIAGNOSIS: NORMAL
EKG P AXIS: 55 DEGREES
EKG P-R INTERVAL: 218 MS
EKG Q-T INTERVAL: 392 MS
EKG QRS DURATION: 130 MS
EKG QTC CALCULATION (BAZETT): 449 MS
EKG R AXIS: -46 DEGREES
EKG T AXIS: 65 DEGREES
EKG VENTRICULAR RATE: 79 BPM
EOSINOPHIL # BLD: 0.2 K/UL (ref 0–0.6)
EOSINOPHIL NFR BLD: 2.2 %
EST. AVERAGE GLUCOSE BLD GHB EST-MCNC: 266.1 MG/DL
GFR SERPLBLD CREATININE-BSD FMLA CKD-EPI: 65 ML/MIN/{1.73_M2}
GLUCOSE BLD-MCNC: 260 MG/DL (ref 70–99)
GLUCOSE BLD-MCNC: 302 MG/DL (ref 70–99)
GLUCOSE BLD-MCNC: 315 MG/DL (ref 70–99)
GLUCOSE BLD-MCNC: 332 MG/DL (ref 70–99)
GLUCOSE BLD-MCNC: 336 MG/DL (ref 70–99)
GLUCOSE BLD-MCNC: 354 MG/DL (ref 70–99)
GLUCOSE SERPL-MCNC: 335 MG/DL (ref 70–99)
HBA1C MFR BLD: 10.9 %
HCT VFR BLD AUTO: 31.2 % (ref 36–48)
HDLC SERPL-MCNC: 45 MG/DL (ref 40–60)
HGB BLD-MCNC: 10.5 G/DL (ref 12–16)
LDLC SERPL CALC-MCNC: 55 MG/DL
LYMPHOCYTES # BLD: 2.6 K/UL (ref 1–5.1)
LYMPHOCYTES NFR BLD: 32 %
MCH RBC QN AUTO: 32.3 PG (ref 26–34)
MCHC RBC AUTO-ENTMCNC: 33.6 G/DL (ref 31–36)
MCV RBC AUTO: 96.1 FL (ref 80–100)
MONOCYTES # BLD: 0.6 K/UL (ref 0–1.3)
MONOCYTES NFR BLD: 7.3 %
NEUTROPHILS # BLD: 4.7 K/UL (ref 1.7–7.7)
NEUTROPHILS NFR BLD: 57.6 %
PERFORMED ON: ABNORMAL
PLATELET # BLD AUTO: 168 K/UL (ref 135–450)
PMV BLD AUTO: 9.5 FL (ref 5–10.5)
POTASSIUM SERPL-SCNC: 3.8 MMOL/L (ref 3.5–5.1)
PROT SERPL-MCNC: 6 G/DL (ref 6.4–8.2)
RBC # BLD AUTO: 3.25 M/UL (ref 4–5.2)
SODIUM SERPL-SCNC: 137 MMOL/L (ref 136–145)
TRIGL SERPL-MCNC: 268 MG/DL (ref 0–150)
VLDLC SERPL CALC-MCNC: 54 MG/DL
WBC # BLD AUTO: 8.1 K/UL (ref 4–11)

## 2024-09-04 PROCEDURE — 2500000003 HC RX 250 WO HCPCS: Performed by: INTERNAL MEDICINE

## 2024-09-04 PROCEDURE — 6370000000 HC RX 637 (ALT 250 FOR IP): Performed by: INTERNAL MEDICINE

## 2024-09-04 PROCEDURE — 97162 PT EVAL MOD COMPLEX 30 MIN: CPT

## 2024-09-04 PROCEDURE — 97530 THERAPEUTIC ACTIVITIES: CPT

## 2024-09-04 PROCEDURE — 80053 COMPREHEN METABOLIC PANEL: CPT

## 2024-09-04 PROCEDURE — 2580000003 HC RX 258: Performed by: INTERNAL MEDICINE

## 2024-09-04 PROCEDURE — 6370000000 HC RX 637 (ALT 250 FOR IP): Performed by: NURSE PRACTITIONER

## 2024-09-04 PROCEDURE — 1200000000 HC SEMI PRIVATE

## 2024-09-04 PROCEDURE — 99223 1ST HOSP IP/OBS HIGH 75: CPT | Performed by: INTERNAL MEDICINE

## 2024-09-04 PROCEDURE — 93306 TTE W/DOPPLER COMPLETE: CPT | Performed by: INTERNAL MEDICINE

## 2024-09-04 PROCEDURE — 83036 HEMOGLOBIN GLYCOSYLATED A1C: CPT

## 2024-09-04 PROCEDURE — 85025 COMPLETE CBC W/AUTO DIFF WBC: CPT

## 2024-09-04 PROCEDURE — 97166 OT EVAL MOD COMPLEX 45 MIN: CPT

## 2024-09-04 PROCEDURE — 80061 LIPID PANEL: CPT

## 2024-09-04 PROCEDURE — 97116 GAIT TRAINING THERAPY: CPT

## 2024-09-04 PROCEDURE — 36415 COLL VENOUS BLD VENIPUNCTURE: CPT

## 2024-09-04 PROCEDURE — 93010 ELECTROCARDIOGRAM REPORT: CPT | Performed by: INTERNAL MEDICINE

## 2024-09-04 PROCEDURE — 93306 TTE W/DOPPLER COMPLETE: CPT

## 2024-09-04 PROCEDURE — 6360000002 HC RX W HCPCS: Performed by: INTERNAL MEDICINE

## 2024-09-04 RX ORDER — MECOBALAMIN 5000 MCG
10 TABLET,DISINTEGRATING ORAL NIGHTLY
Status: DISCONTINUED | OUTPATIENT
Start: 2024-09-04 | End: 2024-09-11 | Stop reason: HOSPADM

## 2024-09-04 RX ORDER — INSULIN GLARGINE 100 [IU]/ML
20 INJECTION, SOLUTION SUBCUTANEOUS NIGHTLY
Status: DISCONTINUED | OUTPATIENT
Start: 2024-09-04 | End: 2024-09-09

## 2024-09-04 RX ADMIN — SODIUM CHLORIDE, PRESERVATIVE FREE 10 ML: 5 INJECTION INTRAVENOUS at 20:47

## 2024-09-04 RX ADMIN — ATORVASTATIN CALCIUM 40 MG: 40 TABLET, FILM COATED ORAL at 20:48

## 2024-09-04 RX ADMIN — FAMOTIDINE 20 MG: 10 INJECTION, SOLUTION INTRAVENOUS at 20:49

## 2024-09-04 RX ADMIN — LISINOPRIL 20 MG: 20 TABLET ORAL at 08:20

## 2024-09-04 RX ADMIN — FAMOTIDINE 20 MG: 10 INJECTION, SOLUTION INTRAVENOUS at 08:27

## 2024-09-04 RX ADMIN — ENOXAPARIN SODIUM 105 MG: 150 INJECTION SUBCUTANEOUS at 20:48

## 2024-09-04 RX ADMIN — SODIUM CHLORIDE, POTASSIUM CHLORIDE, SODIUM LACTATE AND CALCIUM CHLORIDE: 600; 310; 30; 20 INJECTION, SOLUTION INTRAVENOUS at 04:51

## 2024-09-04 RX ADMIN — INSULIN LISPRO 3 UNITS: 100 INJECTION, SOLUTION INTRAVENOUS; SUBCUTANEOUS at 15:04

## 2024-09-04 RX ADMIN — INSULIN LISPRO 3 UNITS: 100 INJECTION, SOLUTION INTRAVENOUS; SUBCUTANEOUS at 17:52

## 2024-09-04 RX ADMIN — ASPIRIN 81 MG 81 MG: 81 TABLET ORAL at 08:20

## 2024-09-04 RX ADMIN — INSULIN GLARGINE 20 UNITS: 100 INJECTION, SOLUTION SUBCUTANEOUS at 20:49

## 2024-09-04 RX ADMIN — INSULIN LISPRO 3 UNITS: 100 INJECTION, SOLUTION INTRAVENOUS; SUBCUTANEOUS at 08:27

## 2024-09-04 RX ADMIN — ACETAMINOPHEN 650 MG: 325 TABLET ORAL at 21:43

## 2024-09-04 RX ADMIN — ENOXAPARIN SODIUM 105 MG: 150 INJECTION SUBCUTANEOUS at 08:19

## 2024-09-04 RX ADMIN — Medication 10 MG: at 22:44

## 2024-09-04 RX ADMIN — LEVOTHYROXINE SODIUM 25 MCG: 0.03 TABLET ORAL at 20:49

## 2024-09-04 RX ADMIN — INSULIN LISPRO 4 UNITS: 100 INJECTION, SOLUTION INTRAVENOUS; SUBCUTANEOUS at 20:49

## 2024-09-04 ASSESSMENT — PAIN DESCRIPTION - PAIN TYPE: TYPE: ACUTE PAIN

## 2024-09-04 ASSESSMENT — PAIN DESCRIPTION - DESCRIPTORS: DESCRIPTORS: ACHING

## 2024-09-04 ASSESSMENT — PAIN SCALES - GENERAL: PAINLEVEL_OUTOF10: 2

## 2024-09-04 ASSESSMENT — PAIN DESCRIPTION - LOCATION: LOCATION: HEAD

## 2024-09-04 NOTE — PROGRESS NOTES
Nutrition Note    NUTRITION     Nutrition screening referral was triggered based on results obtained during nursing admission assessment for Decreased appetite.    The patient's chart was reviewed and nutrition assessment is not indicated at this time.  Patient will be seen per nutrition standards of care.         Marlene Limon RD, ARIANA      Electronically signed by Marlene Liomn RD, ARIANA on 9/4/24 at 2:22 PM EDT    Contact: [phone]

## 2024-09-04 NOTE — PROGRESS NOTES
Occupational Therapy  Facility/Department: [place] [address] - MED SURG/ORTHO  Occupational Therapy Initial Assessment/ Treatment Note    Name: Gail Stewart  : 1945  MRN: [id number]  Date of Service: 2024    Discharge Recommendations:  Home with assist PRN  OT Equipment Recommendations  Equipment Needed: No    Patient Diagnosis(es): The primary encounter diagnosis was Uncontrolled type 2 diabetes mellitus with hyperglycemia (HCC). Diagnoses of Chest pain, unspecified type, Elevated troponin, Acute kidney injury (HCC), Elevated blood pressure reading, Shortness of breath, and Acute coronary syndrome (HCC) were also pertinent to this visit.  Past Medical History:  has a past medical history of Deep vein thrombosis (DVT) (HCC), Degenerative disc disease, lumbar, Diabetes mellitus (HCC), HLD (hyperlipidemia), Hx of blood clots, Hypothyroidism, Localized osteoarthrosis of left hip, and Neuropathy.  Past Surgical History:  has a past surgical history that includes Hysterectomy; Total hip arthroplasty (Right, 10/09/2019); joint replacement; hip surgery; Total hip arthroplasty (Left, 2020); and Thyroidectomy, partial (Right).    Assessment  Assessment: 77 y/o female presents to [facility] c hyperglycemia d/t diabetes mellitus. [facility] Pt currently completing bed mobility/ functional transfers MOD IND, LB dress MOD IND and mobility SUPV free of AD. Pt declining additional OT needs/ concerns at this time. Pt required additional education regarding risks associated w/ elevated Troponin levels as pt questioning why she is on caffeine free diet w/ pending cardiology consult; pt required repeated education from both therapists and hospitalist prior to pt verbalizing understanding. Pt functioning at baseline and does not required additional OT services at this time. OT rec return to IND living apartment w/ PRN A once medically ready. DC OT.   Prognosis: Good  Decision Making: Medium

## 2024-09-04 NOTE — ED NOTES
Gail Stewart is a 78 y.o. female admitted for  Principal Problem:    Hyperglycemia due to diabetes mellitus (HCC)  Resolved Problems:    * No resolved hospital problems. *  .   Patient Home via self with   Chief Complaint   Patient presents with    elevated glucose     Patient reports she was sent in today for an elevated glucose.  States her MD stopped her Trulicity due to illness.  MD checked blood work today, called her and told her to come to the emergency department.     .  Patient is alert and Person, Place, Time, and Situation  Patient's baseline mobility: Baseline Mobility: Independent   Code Status: Full Code   Cardiac Rhythm:       Is patient on baseline Oxygen: no how many Liters:   Abnormal Assessment Findings: hyperglycemic    Isolation: None      NIH Score:    C-SSRS: Risk of Suicide: No Risk  Bedside swallow:        Active LDA's:   Peripheral IV 07/13/20 Right Forearm (Active)       Peripheral IV 09/03/24 Right Antecubital (Active)     Patient admitted with a herrera: no If the herrera is chronic was it exchanged:  Reason for herrera:   Patient admitted with Central Line:  . PICC line placement confirmed: YES OR NO:[id number]}   Reason for Central line:   Was central line Inserted from an outside facility:        Family/Caregiver Present no Any Concerns: no   Restraints no  Sitter no         Vitals: MEWS Score: 0    Vitals:    09/03/24 1815 09/03/24 1852 09/03/24 1924 09/03/24 2025   BP: (!) 140/73 136/70 (!) 165/72 (!) 144/67   Pulse: 75  75    Resp: 18  18    Temp:       TempSrc:       SpO2: 100% 100% 100% 99%   Weight:       Height:           Last documented pain score (0-10 scale)    Pain medication administered No.    Pertinent or High Risk Medications/Drips: .    Pending Blood Product Administration: no    Abnormal labs:   Abnormal Labs Reviewed   CBC WITH AUTO DIFFERENTIAL - Abnormal; Notable for the following components:       Result Value    RBC 3.61 (*)     Hemoglobin 11.8 (*)     Hematocrit 34.7 (*)

## 2024-09-04 NOTE — PROGRESS NOTES
09/04/24 1605   Encounter Summary   Encounter Overview/Reason Spiritual/Emotional Needs   Service Provided For Patient   Referral/Consult From Multi-disciplinary team   Support System Unknown   Last Encounter  09/04/24   Complexity of Encounter Low   Begin Time 1547   End Time  1607   Total Time Calculated 20 min   Spiritual/Emotional needs   Type Spiritual Support;Emotional Distress   Rituals, Rites and Sacraments   Type Blessings   Assessment/Intervention/Outcome   Assessment Anxious;Compromised coping;Interrupted family processes   Intervention Active listening;Discussed illness injury and it’s impact;Discussed belief system/Quaker practices/evy;Discussed relationship with God;Explored Coping Skills/Resources;Explored/Affirmed feelings, thoughts, concerns;Prayer (assurance of)/[place];Read/Provided Scripture;Sustaining Presence/Ministry of presence;Nurtured Hope   Outcome Comfort;Connection/Belonging;Coping;Encouraged;Engaged in conversation;Expressed feelings, needs, and concerns;Expressed Gratitude;Optimistic;Receptive  (Patient expressed conflict with contacting family member to inform of procedure tomorrow. Processed patient's concerns and offered an active listening, supporive presence.  provided prayer, New Testament/Psalms book and prayer card for comfort.)      Barbara Wilson EdD, REBECCA MEMBRENO ([facility])    Thank you for consulting Spiritual Health    If you would like a 's presence for emotional, spiritual, grief or comfort care,   please dial \"0\" and ask for the  on-call to be paged.    For help with Advanced Care Planning, Power of  for Healthcare or Living Will forms, you may also call us directly:    [phone] [place]  [phone]) [place]  [phone]) Outpatient    Formerly [facility]

## 2024-09-04 NOTE — PLAN OF CARE
Problem: Discharge Planning  Goal: Discharge to home or other facility with appropriate resources  Outcome: Progressing     Problem: Safety - Adult  Goal: Free from fall injury  Outcome: Progressing  Bed in lowest, locked position, side rails up X2, bed check in place, call light within reach.  Instructed pt to call for assistance before getting out of bed, pt verbalized understanding. Will continue to monitor.     Problem: Skin/Tissue Integrity - Adult  Goal: Skin integrity remains intact  Outcome: Progressing     Problem: Genitourinary - Adult  Goal: Absence of urinary retention  Outcome: Progressing     Problem: Infection - Adult  Goal: Absence of infection at discharge  Outcome: Progressing  Goal: Absence of infection during hospitalization  Outcome: Progressing     Problem: Metabolic/Fluid and Electrolytes - Adult  Goal: Electrolytes maintained within normal limits  Outcome: Progressing  Goal: Hemodynamic stability and optimal renal function maintained  Outcome: Progressing  Goal: Glucose maintained within prescribed range  Outcome: Progressing

## 2024-09-04 NOTE — PROGRESS NOTES
Physical Therapy  Facility/Department: [place] [id number] - MED SURG/ORTHO  Physical Therapy Initial Assessment/Discharge Summary     Name: Gail Stewart  : 1945  MRN: [id number]  Date of Service: 2024    Discharge Recommendations:  Home with assist PRN   PT Equipment Recommendations  Equipment Needed: No      Patient Diagnosis(es): The primary encounter diagnosis was Uncontrolled type 2 diabetes mellitus with hyperglycemia (HCC). Diagnoses of Chest pain, unspecified type, Elevated troponin, Acute kidney injury (HCC), Elevated blood pressure reading, Shortness of breath, and Acute coronary syndrome (HCC) were also pertinent to this visit.  Past Medical History:  has a past medical history of Deep vein thrombosis (DVT) (HCC), Degenerative disc disease, lumbar, Diabetes mellitus (HCC), HLD (hyperlipidemia), Hx of blood clots, Hypothyroidism, Localized osteoarthrosis of left hip, and Neuropathy.  Past Surgical History:  has a past surgical history that includes Hysterectomy; Total hip arthroplasty (Right, 10/09/2019); joint replacement; hip surgery; Total hip arthroplasty (Left, 2020); and Thyroidectomy, partial (Right).    Assessment  Body Structures, Functions, Activity Limitations Requiring Skilled Therapeutic Intervention: Decreased functional mobility ;Decreased endurance;Decreased sensation;Decreased balance;Decreased body mechanics;Decreased posture  Assessment: Pt seen for PT evaluation following admission for CP/SOB and hyperglycemia. Prior to admission, pt was living alone in independent senior living facility and reports being independent with mobility with no AD. Pt currently performs bed mobility with mod-ind, and transfers and gait up to 50 ft in room with no AD and supv. At this time, pt functioning near baseline and has no further acute PT needs, will sign off. Recommending home with PRN A at d/c.  Treatment Diagnosis: decreased functional mobility and endurance  Therapy Prognosis: Good  Decision    Orientation  Overall Orientation Status: Within Functional Limits  Orientation Level: Oriented X4    Objective  Temp: 97.5 °F (36.4 °C)  Pulse: 78  Heart Rate Source: Monitor  Respirations: 15  SpO2: 97 %  O2 Device: None (Room air)  BP: 120/67  MAP (Calculated): 85  BP Location: Left upper arm  BP Method: Automatic  Patient Position: High fowlers     Observation/Palpation  Posture: Fair  Gross Assessment  AROM: Within functional limits  Strength: Within functional limits  Sensation: Impaired (neuropathy in B feet and hands occasionally)                Bed Mobility Training  Bed Mobility Training: Yes  Overall Level of Assistance:  (HOB elevated)  Interventions: Verbal cues  Supine to Sit: Modified independent  Sit to Supine: Modified independent  Scooting: Moderate assistance;Assist X2 (up in supine in bed)  Balance  Sitting: Intact  Standing: Intact (no AD)  Transfer Training  Transfer Training: Yes  Overall Level of Assistance: Supervision  Interventions: Verbal cues;Visual cues;Safety awareness training (cues for managing IV line)  Sit to Stand: Supervision  Stand to Sit: Supervision  Gait  Gait Training: Yes  Overall Level of Assistance: Supervision;Additional time (for IV management)  Distance (ft): 50 Feet  Assistive Device: Gait belt;None  Interventions: Verbal cues;Visual cues;Safety awareness training  Base of Support: Widened  Speed/Lorna: Slow  Step Length: Right shortened;Left shortened  Gait Abnormalities: Trunk sway increased;Decreased step clearance;Path deviations                       AM-PAC - Mobility    AM-PAC Basic Mobility - Inpatient   How much help is needed turning from your back to your side while in a flat bed without using bedrails?: None  How much help is needed moving from lying on your back to sitting on the side of a flat bed without using bedrails?: None  How much help is needed moving to and from a bed to a chair?: None  How much help is needed standing up from a chair using

## 2024-09-04 NOTE — CARE COORDINATION
Case Management Assessment  Initial Evaluation    Date/Time of Evaluation: 9/4/2024 11:54 AM  Assessment Completed by: Skye Soto RN    If patient is discharged prior to next notation, then this note serves as note for discharge by case management.    Patient Name: Gail Stewart                   YOB: 1945  Diagnosis: Elevated blood pressure reading [R03.0]  Elevated troponin [R79.89]  Acute kidney injury (HCC) [N17.9]  Uncontrolled type 2 diabetes mellitus with hyperglycemia (HCC) [E11.65]  Chest pain, unspecified type [R07.9]  Hyperglycemia due to diabetes mellitus (HCC) [E11.65]                   Date / Time: 9/3/2024  5:16 PM    Patient Admission Status: Inpatient   Readmission Risk (Low < 19, Mod (19-27), High > 27): Readmission Risk Score: 7.8    Current PCP: Vicki Berg APRN - CNP  PCP verified by CM?      Chart Reviewed: Yes      History Provided by:    Patient Orientation:      Patient Cognition:      Hospitalization in the last 30 days (Readmission):  No    If yes, Readmission Assessment in CM Navigator will be completed.    Advance Directives:      Code Status: Full Code   Patient's Primary Decision Maker is:      Primary Decision Maker: no call,no call - Brother/Sister    Discharge Planning:    Patient lives with: Alone Type of Home: Independent Living  Primary Care Giver:    Patient Support Systems include: None   Current Financial resources:    Current community resources:    Current services prior to admission: Home Care            Current DME:              Type of Home Care services:  Nursing Services    ADLS  Prior functional level:    Current functional level:      PT AM-PAC: 23 /24  OT AM-PAC: /24    Family can provide assistance at DC:    Would you like Case Management to discuss the discharge plan with any other family members/significant others, and if so, who?    Plans to Return to Present Housing:    Other Identified Issues/Barriers to RETURNING to current housing:  Blood Sugar Issues  Potential Assistance needed at discharge: N/A            Potential DME:    Patient expects to discharge to: Independent living facility  Plan for transportation at discharge:      Financial    Payor: HU[employer] MEDICARE / Plan: HU[employer] CHOICE-PPO MEDICARE / Product Type: *No Product type* /     Does insurance require precert for SNF: Yes    Potential assistance Purchasing Medications: No  Meds-to-Beds request: Yes      [id number] - [place], [place] - [address] - P [phone] - F [phone]  [address] [facility] [address]  Phone: [phone] Fax: [phone]    [facility] - [address] - P [phone] - F [phone]  [address] [facility] [address]  Phone: [phone] Fax: [phone]      Notes:    Factors facilitating achievement of predicted outcomes: Friend support, Motivated, Cooperative, and Good insight into deficits    Barriers to discharge: Medication managment    Additional Case Management Notes: Patient lives at [facility] in an apartment that is Ind living. Patient is IPTA, she is not an active , her friends help her with transport when needed. No current services in the home. Likely NN at HI    The Plan for Transition of Care is related to the following treatment goals of Elevated blood pressure reading [R03.0]  Elevated troponin [R79.89]  Acute kidney injury (HCC) [N17.9]  Uncontrolled type 2 diabetes mellitus with hyperglycemia (HCC) [E11.65]  Chest pain, unspecified type [R07.9]  Hyperglycemia due to diabetes mellitus (HCC) [E11.65]    IF APPLICABLE: The Patient and/or patient representative Gail and her family were provided with a choice of provider and agrees with the discharge plan. Freedom of choice list with basic dialogue that supports the patient's individualized plan of care/goals and shares the quality data associated with the providers was provided to:     Patient

## 2024-09-04 NOTE — PROGRESS NOTES
Hospital Medicine Progress Note      Date of Admission: 9/3/2024  Hospital Day: 2    Chief Admission Complaint:  \"Chest pain, significantly elevated blood sugar elevated\"     Subjective:  no new c/o    Presenting Admission History:         \"This is a 78-year-old female who was brought to the emergency room due to worsening of shortness of breath which is new to her.  Patient also noticed to have elevated blood sugar levels greater than 500 today.     Upon further questioning patient states that she has been on Trulicity which was discontinued by her primary care physician 3 weeks ago and did not have any other replacement for blood sugar control.     Patient remains alert awake oriented x 3 answers all questions appropriately  Patient denies any palpitation orthopnea no loss of consciousness no diaphoresis.  Patient denies having any recent chills fever no nausea vomiting abdominal pain no diarrhea constipation no muscle weakness paralysis no loss of sensation\".       Assessment/Plan:      Current Principal Problem:  Hyperglycemia due to diabetes mellitus (HCC)       DM2 - normally controlled on home antiGlycemics - stopped 2 weeks PTAdmision - .  Started on low dose Lantus and follow FSBS for general monitoring and dosing decisions on SSI low regimen.  Monitored closely for hypoglycemic ADR and toxic effects of insulin w/ POCT and hypoglycemic rescue as ordered. Last HbA1c 10.8% dated May 2024. Resume home regimen at discharge.     HTN - w/out known CAD and no evidence of active signs/sxs of ischemia/failure. Currently controlled on home meds w/ vitals documented and reviewed.    HyperLipidemia - normally controlled on home Statin. Continued.  F/U w/ PCP outpt for medication initiation/adjustment as needed.     Tobacco Abuse - active and ongoing.  Cessation counseled.  Nicotine replacement PRN.    Chest pain - Concerning to ED for ACS but etiology clinically unable to determine, possibly 2nd to ischemia.  Initial

## 2024-09-04 NOTE — PROGRESS NOTES
4 Eyes Skin Assessment     NAME:  Gail Stewart  YOB: 1945  MEDICAL RECORD NUMBER:  [id number]    The patient is being assessed for  Admission    I agree that at least one RN has performed a thorough Head to Toe Skin Assessment on the patient. ALL assessment sites listed below have been assessed.      Areas assessed by both nurses:    Head, Face, Ears, Shoulders, Back, Chest, Arms, Elbows, Hands, Sacrum. Buttock, Coccyx, Ischium, Legs. Feet and Heels, and Under Medical Devices         Does the Patient have a Wound? No noted wound(s)       Sebastian Prevention initiated by RN: No  Wound Care Orders initiated by RN: No    Pressure Injury (Stage 3,4, Unstageable, DTI, NWPT, and Complex wounds) if present, place Wound referral order by RN under : No    New Ostomies, if present place, Ostomy referral order under : No     Nurse 1 eSignature: Electronically signed by Lena Sarah RN on 9/3/24 at 11:40 PM EDT    **SHARE this note so that the co-signing nurse can place an eSignature**    Nurse 2 eSignature: Electronically signed by Mena Payne RN on 9/4/24 at 2:41 AM EDT

## 2024-09-05 PROBLEM — R07.9 CHEST PAIN: Status: ACTIVE | Noted: 2024-09-03

## 2024-09-05 LAB
ALBUMIN SERPL-MCNC: 3.7 G/DL (ref 3.4–5)
ANION GAP SERPL CALCULATED.3IONS-SCNC: 8 MMOL/L (ref 3–16)
BUN SERPL-MCNC: 21 MG/DL (ref 7–20)
CALCIUM SERPL-MCNC: 9 MG/DL (ref 8.3–10.6)
CHLORIDE SERPL-SCNC: 104 MMOL/L (ref 99–110)
CO2 SERPL-SCNC: 24 MMOL/L (ref 21–32)
CREAT SERPL-MCNC: 1 MG/DL (ref 0.6–1.2)
DEPRECATED RDW RBC AUTO: 12.7 % (ref 12.4–15.4)
GFR SERPLBLD CREATININE-BSD FMLA CKD-EPI: 57 ML/MIN/{1.73_M2}
GLUCOSE BLD-MCNC: 279 MG/DL (ref 70–99)
GLUCOSE BLD-MCNC: 279 MG/DL (ref 70–99)
GLUCOSE BLD-MCNC: 328 MG/DL (ref 70–99)
GLUCOSE BLD-MCNC: 390 MG/DL (ref 70–99)
GLUCOSE SERPL-MCNC: 317 MG/DL (ref 70–99)
HCT VFR BLD AUTO: 31 % (ref 36–48)
HGB BLD-MCNC: 10.5 G/DL (ref 12–16)
MCH RBC QN AUTO: 32.1 PG (ref 26–34)
MCHC RBC AUTO-ENTMCNC: 33.9 G/DL (ref 31–36)
MCV RBC AUTO: 94.8 FL (ref 80–100)
PERFORMED ON: ABNORMAL
PHOSPHATE SERPL-MCNC: 3.1 MG/DL (ref 2.5–4.9)
PLATELET # BLD AUTO: 163 K/UL (ref 135–450)
PMV BLD AUTO: 8.5 FL (ref 5–10.5)
POTASSIUM SERPL-SCNC: 4.1 MMOL/L (ref 3.5–5.1)
RBC # BLD AUTO: 3.26 M/UL (ref 4–5.2)
SODIUM SERPL-SCNC: 136 MMOL/L (ref 136–145)
WBC # BLD AUTO: 7 K/UL (ref 4–11)

## 2024-09-05 PROCEDURE — 2500000003 HC RX 250 WO HCPCS: Performed by: INTERNAL MEDICINE

## 2024-09-05 PROCEDURE — 2580000003 HC RX 258: Performed by: INTERNAL MEDICINE

## 2024-09-05 PROCEDURE — 6370000000 HC RX 637 (ALT 250 FOR IP): Performed by: INTERNAL MEDICINE

## 2024-09-05 PROCEDURE — 36415 COLL VENOUS BLD VENIPUNCTURE: CPT

## 2024-09-05 PROCEDURE — 80069 RENAL FUNCTION PANEL: CPT

## 2024-09-05 PROCEDURE — 1200000000 HC SEMI PRIVATE

## 2024-09-05 PROCEDURE — 85027 COMPLETE CBC AUTOMATED: CPT

## 2024-09-05 PROCEDURE — 99232 SBSQ HOSP IP/OBS MODERATE 35: CPT | Performed by: INTERNAL MEDICINE

## 2024-09-05 PROCEDURE — 6370000000 HC RX 637 (ALT 250 FOR IP): Performed by: NURSE PRACTITIONER

## 2024-09-05 RX ORDER — SODIUM CHLORIDE 0.9 % (FLUSH) 0.9 %
5-40 SYRINGE (ML) INJECTION EVERY 12 HOURS SCHEDULED
OUTPATIENT
Start: 2024-09-06

## 2024-09-05 RX ORDER — REGADENOSON 0.08 MG/ML
0.4 INJECTION, SOLUTION INTRAVENOUS
Status: COMPLETED | OUTPATIENT
Start: 2024-09-05 | End: 2024-09-06

## 2024-09-05 RX ORDER — LORAZEPAM 0.5 MG/1
0.5 TABLET ORAL
OUTPATIENT
Start: 2024-09-06 | End: 2024-09-07

## 2024-09-05 RX ORDER — SODIUM CHLORIDE 0.9 % (FLUSH) 0.9 %
5-40 SYRINGE (ML) INJECTION PRN
OUTPATIENT
Start: 2024-09-06

## 2024-09-05 RX ORDER — SODIUM CHLORIDE 9 MG/ML
INJECTION, SOLUTION INTRAVENOUS PRN
OUTPATIENT
Start: 2024-09-06

## 2024-09-05 RX ORDER — ONDANSETRON 2 MG/ML
4 INJECTION INTRAMUSCULAR; INTRAVENOUS EVERY 6 HOURS PRN
OUTPATIENT
Start: 2024-09-06

## 2024-09-05 RX ORDER — ASPIRIN 325 MG
325 TABLET ORAL ONCE
OUTPATIENT
Start: 2024-09-06

## 2024-09-05 RX ADMIN — INSULIN LISPRO 4 UNITS: 100 INJECTION, SOLUTION INTRAVENOUS; SUBCUTANEOUS at 20:11

## 2024-09-05 RX ADMIN — FAMOTIDINE 20 MG: 10 INJECTION, SOLUTION INTRAVENOUS at 08:13

## 2024-09-05 RX ADMIN — SODIUM CHLORIDE, PRESERVATIVE FREE 10 ML: 5 INJECTION INTRAVENOUS at 20:12

## 2024-09-05 RX ADMIN — ATORVASTATIN CALCIUM 40 MG: 40 TABLET, FILM COATED ORAL at 20:11

## 2024-09-05 RX ADMIN — ASPIRIN 81 MG 81 MG: 81 TABLET ORAL at 08:23

## 2024-09-05 RX ADMIN — SODIUM CHLORIDE, PRESERVATIVE FREE 10 ML: 5 INJECTION INTRAVENOUS at 08:13

## 2024-09-05 RX ADMIN — LISINOPRIL 20 MG: 20 TABLET ORAL at 09:25

## 2024-09-05 RX ADMIN — INSULIN LISPRO 2 UNITS: 100 INJECTION, SOLUTION INTRAVENOUS; SUBCUTANEOUS at 11:52

## 2024-09-05 RX ADMIN — INSULIN LISPRO 2 UNITS: 100 INJECTION, SOLUTION INTRAVENOUS; SUBCUTANEOUS at 17:27

## 2024-09-05 RX ADMIN — Medication 10 MG: at 20:11

## 2024-09-05 RX ADMIN — LEVOTHYROXINE SODIUM 25 MCG: 0.03 TABLET ORAL at 20:11

## 2024-09-05 RX ADMIN — INSULIN LISPRO 3 UNITS: 100 INJECTION, SOLUTION INTRAVENOUS; SUBCUTANEOUS at 08:12

## 2024-09-05 RX ADMIN — ACETAMINOPHEN 650 MG: 325 TABLET ORAL at 20:11

## 2024-09-05 RX ADMIN — INSULIN GLARGINE 20 UNITS: 100 INJECTION, SOLUTION SUBCUTANEOUS at 20:12

## 2024-09-05 RX ADMIN — FAMOTIDINE 20 MG: 10 INJECTION, SOLUTION INTRAVENOUS at 21:01

## 2024-09-05 ASSESSMENT — PAIN SCALES - GENERAL
PAINLEVEL_OUTOF10: 0
PAINLEVEL_OUTOF10: 0
PAINLEVEL_OUTOF10: 3
PAINLEVEL_OUTOF10: 4
PAINLEVEL_OUTOF10: 0
PAINLEVEL_OUTOF10: 0
PAINLEVEL_OUTOF10: 1
PAINLEVEL_OUTOF10: 1

## 2024-09-05 ASSESSMENT — PAIN - FUNCTIONAL ASSESSMENT: PAIN_FUNCTIONAL_ASSESSMENT: ACTIVITIES ARE NOT PREVENTED

## 2024-09-05 ASSESSMENT — PAIN DESCRIPTION - LOCATION
LOCATION: HEAD
LOCATION: HEAD

## 2024-09-05 ASSESSMENT — PAIN DESCRIPTION - DESCRIPTORS
DESCRIPTORS: ACHING
DESCRIPTORS: ACHING

## 2024-09-05 ASSESSMENT — PAIN DESCRIPTION - PAIN TYPE: TYPE: ACUTE PAIN

## 2024-09-05 ASSESSMENT — PAIN DESCRIPTION - ORIENTATION: ORIENTATION: MID

## 2024-09-05 NOTE — CARE COORDINATION
Writer reviewed chart, and spoke with RN, patient is currently NPO for possible Cath this day later in afternoon. Patient lives in Ind. Living at [facility], and plans to return. BS still elevated.     Skye Soto RN

## 2024-09-05 NOTE — PROGRESS NOTES
Severe exacerbation of chronic illness:    ---------------------------------------------------------------------  B. Risk of Treatment (any 1)   [x] Drugs/treatments that require intensive monitoring for toxicity include:    [] IV ABX requiring serial renal monitoring for nephrotoxicity:     [] Post-Cath/Contrast study requiring serial renal monitoring for Contrast Induced Nephropathy  [] IV Narcotic analgesia for ADR   [] Aggressive IV diuresis requiring serial monitoring for renal impairment and electrolyte derangements  [] Hypertonic Saline requiring serial renal monitoring for appropriate electrolyte correction rate   [] Critical electrolyte abnormalities requiring IV replacement and close serial monitoring  [x] Insulin - monitoring FSBS for Hypoglycemic ADR  [] Anticoagulation requiring close serial monitoring and dose adjustments at high risk of ADR   [] HD requiring close serial monitoring of electrolytes and fluid status  [] Other -  [] Change in code status:    [] Decision to escalate care:    [] Major surgery/procedure with associated risk factors:    ----------------------------------------------------------------------  C. Data (any 2)  [] Discussed management of the case with consultants as follows:    [x] Discussed the discharge plan in detail with case mgt including timing/barriers to discharge, need for support services and placement decision   [] Imaging personally reviewed and interpreted, includes:   [x] Telemetry monitoring as noted above  [x] Data Review (any 3)  [] Collateral history obtained from:    [x] All available Consultant notes from yesterday/today were reviewed  [x] All current labs were reviewed and interpreted for clinical significance   [x] Appropriate follow-up labs were ordered    Medications:  Personally reviewed in detail in conjunction w/ labs as documented for evidence of drug toxicity.     Infusion Medications    sodium chloride      dextrose       Scheduled Medications     insulin glargine  20 Units SubCUTAneous Nightly    melatonin  10 mg Oral Nightly    sodium chloride flush  5-40 mL IntraVENous 2 times per day    aspirin  81 mg Oral Daily    atorvastatin  40 mg Oral Nightly    famotidine (PEPCID) injection  20 mg IntraVENous BID    [Held by provider] enoxaparin  1 mg/kg SubCUTAneous BID    levothyroxine  25 mcg Oral Daily    lisinopril  20 mg Oral Daily    insulin lispro  0-4 Units SubCUTAneous TID WC    insulin lispro  0-4 Units SubCUTAneous Nightly     PRN Meds: perflutren lipid microspheres, sodium chloride flush, sodium chloride, ondansetron **OR** ondansetron, acetaminophen **OR** acetaminophen, polyethylene glycol, nitroGLYCERIN, cyclobenzaprine, glucose, dextrose bolus **OR** dextrose bolus, glucagon (rDNA), dextrose     Labs:  Personally reviewed and interpreted for clinical significance.     Recent Labs     09/03/24 1825 09/04/24 0614 09/05/24  0528   WBC 10.4 8.1 7.0   HGB 11.8* 10.5* 10.5*   HCT 34.7* 31.2* 31.0*    168 163     Recent Labs     09/03/24 1825 09/04/24 0614 09/05/24  0529   * 137 136   K 4.8 3.8 4.1   CL 95* 103 104   CO2 23 23 24   BUN 25* 17 21*   CREATININE 1.2 0.9 1.0   CALCIUM 9.7 9.2 9.0   MG 1.80  --   --    PHOS  --   --  3.1     Recent Labs     09/03/24 1825 09/03/24 2030 09/03/24  2141   PROBNP 93  --   --    TROPHS 20* 20* 21*     Recent Labs     09/04/24 0614   LABA1C 10.9     Recent Labs     09/03/24 1825 09/04/24  0614   AST 22 23   ALT 29 25   BILITOT 0.3 0.3   ALKPHOS 79 65     No results for input(s): \"INR\", \"LACTA\", \"TSH\" in the last 72 hours.    Urine Cultures:   Lab Results   Component Value Date/Time    LABURIN  09/03/2024 10:22 AM     <10,000 CFU/ml mixed skin/urogenital gasper. No further workup     Blood Cultures: No results found for: \"BC\"  No results found for: \"BLOODCULT2\"  Organism:   Lab Results   Component Value Date/Time    ORG Escherichia coli 07/15/2021 05:38 PM         Magdy Carolina MD

## 2024-09-05 NOTE — PROGRESS NOTES
Physician Progress Note      PATIENT:               MERARY BUI  CSN #:                  [id number]  :                       1945  ADMIT DATE:       9/3/2024 5:16 PM  DISCH DATE:  RESPONDING  PROVIDER #:        Lewis Garcia MD          QUERY TEXT:    Patient admitted with Hyperglycemia due to diabetes mellitus. Noted   documentation of Acute Kidney Injury in H&P .  In order to support the   diagnosis of RANJEET, please include additional clinical indicators in your   documentation.? Or please document if the diagnosis of RANJEET has been ruled out   after further study.    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 Given IVF hydration and follow serial labs -   documented and reviewed.  BUN-25,17  CR-1.2,0.9  GFR-46,65    Treatment: IV Fluid, Serial Labs    Thank You  Romero OKEEFE CDS    Defined by Kidney Disease Improving Global Outcomes (KDIGO) clinical practice   guideline for acute kidney injury:  -Increase in SCr by greater than or equal to 0.3 mg/dl within 48 hours; or  -Increase or decrease in SCr to greater than or equal to 1.5 times baseline,   which is known or presumed to have occurred within the prior 7 days; or  -Urine volume < 0.5ml/kg/h for 6 hours.  Options provided:  -- Acute kidney injury evidenced by, Please document evidence as well as a   numerical baseline creatinine, if known.  -- Acute kidney injury ruled out after study  -- Other - I will add my own diagnosis  -- Disagree - Not applicable / Not valid  -- Disagree - Clinically unable to determine / Unknown  -- Refer to Clinical Documentation Reviewer    PROVIDER RESPONSE TEXT:    Acute kidney injury was ruled out after study.    Query created by: Romero Woods on 2024 12:01 AM      Electronically signed by:  Lewis GREER

## 2024-09-06 ENCOUNTER — APPOINTMENT (OUTPATIENT)
Age: 79
DRG: 639 | End: 2024-09-06
Payer: MEDICARE

## 2024-09-06 ENCOUNTER — APPOINTMENT (OUTPATIENT)
Dept: NUCLEAR MEDICINE | Age: 79
DRG: 639 | End: 2024-09-06
Payer: MEDICARE

## 2024-09-06 LAB
ECHO BSA: 2.16 M2
GLUCOSE BLD-MCNC: 284 MG/DL (ref 70–99)
GLUCOSE BLD-MCNC: 298 MG/DL (ref 70–99)
GLUCOSE BLD-MCNC: 320 MG/DL (ref 70–99)
GLUCOSE BLD-MCNC: 373 MG/DL (ref 70–99)
GLUCOSE BLD-MCNC: 406 MG/DL (ref 70–99)
NUC STRESS EJECTION FRACTION: 64 %
NUC STRESS LV EDV: 96 ML (ref 56–104)
NUC STRESS LV ESV: 35 ML (ref 19–49)
NUC STRESS LV MASS: 131 G
PERFORMED ON: ABNORMAL
STRESS BASELINE DIAS BP: 87 MMHG
STRESS BASELINE HR: 73 BPM
STRESS BASELINE SYS BP: 171 MMHG
STRESS ESTIMATED WORKLOAD: 1 METS
STRESS PEAK DIAS BP: 81 MMHG
STRESS PEAK SYS BP: 183 MMHG
STRESS PERCENT HR ACHIEVED: 70 %
STRESS POST PEAK HR: 100 BPM
STRESS RATE PRESSURE PRODUCT: NORMAL BPM*MMHG
STRESS STAGE 1 BP: NORMAL MMHG
STRESS STAGE 1 COMMENTS: NORMAL
STRESS STAGE 1 DURATION: 1 MIN:SEC
STRESS STAGE 1 HR: 85 BPM
STRESS STAGE RECOVERY 4 BP: NORMAL MMHG
STRESS STAGE RECOVERY 4 DURATION: 5 MIN:SEC
STRESS STAGE RECOVERY 4 HR: 86 BPM
STRESS TARGET HR: 142 BPM
TID: 0.9

## 2024-09-06 PROCEDURE — 78452 HT MUSCLE IMAGE SPECT MULT: CPT | Performed by: INTERNAL MEDICINE

## 2024-09-06 PROCEDURE — A9502 TC99M TETROFOSMIN: HCPCS | Performed by: INTERNAL MEDICINE

## 2024-09-06 PROCEDURE — 6370000000 HC RX 637 (ALT 250 FOR IP): Performed by: INTERNAL MEDICINE

## 2024-09-06 PROCEDURE — 93016 CV STRESS TEST SUPVJ ONLY: CPT | Performed by: INTERNAL MEDICINE

## 2024-09-06 PROCEDURE — 99232 SBSQ HOSP IP/OBS MODERATE 35: CPT | Performed by: INTERNAL MEDICINE

## 2024-09-06 PROCEDURE — 1200000000 HC SEMI PRIVATE

## 2024-09-06 PROCEDURE — 2500000003 HC RX 250 WO HCPCS: Performed by: INTERNAL MEDICINE

## 2024-09-06 PROCEDURE — 6370000000 HC RX 637 (ALT 250 FOR IP): Performed by: NURSE PRACTITIONER

## 2024-09-06 PROCEDURE — 3430000000 HC RX DIAGNOSTIC RADIOPHARMACEUTICAL: Performed by: INTERNAL MEDICINE

## 2024-09-06 PROCEDURE — 78452 HT MUSCLE IMAGE SPECT MULT: CPT

## 2024-09-06 PROCEDURE — 93017 CV STRESS TEST TRACING ONLY: CPT

## 2024-09-06 PROCEDURE — 2580000003 HC RX 258: Performed by: INTERNAL MEDICINE

## 2024-09-06 PROCEDURE — 93018 CV STRESS TEST I&R ONLY: CPT | Performed by: INTERNAL MEDICINE

## 2024-09-06 PROCEDURE — 6360000002 HC RX W HCPCS: Performed by: INTERNAL MEDICINE

## 2024-09-06 RX ORDER — INSULIN LISPRO 100 [IU]/ML
0-4 INJECTION, SOLUTION INTRAVENOUS; SUBCUTANEOUS NIGHTLY
Status: DISCONTINUED | OUTPATIENT
Start: 2024-09-06 | End: 2024-09-11 | Stop reason: DRUGHIGH

## 2024-09-06 RX ORDER — INSULIN GLARGINE 100 [IU]/ML
10 INJECTION, SOLUTION SUBCUTANEOUS DAILY
Status: DISCONTINUED | OUTPATIENT
Start: 2024-09-06 | End: 2024-09-08

## 2024-09-06 RX ORDER — INSULIN LISPRO 100 [IU]/ML
0-8 INJECTION, SOLUTION INTRAVENOUS; SUBCUTANEOUS
Status: DISCONTINUED | OUTPATIENT
Start: 2024-09-06 | End: 2024-09-11 | Stop reason: DRUGHIGH

## 2024-09-06 RX ORDER — INSULIN LISPRO 100 [IU]/ML
2 INJECTION, SOLUTION INTRAVENOUS; SUBCUTANEOUS ONCE
Status: COMPLETED | OUTPATIENT
Start: 2024-09-06 | End: 2024-09-06

## 2024-09-06 RX ADMIN — FAMOTIDINE 20 MG: 10 INJECTION, SOLUTION INTRAVENOUS at 20:40

## 2024-09-06 RX ADMIN — INSULIN LISPRO 4 UNITS: 100 INJECTION, SOLUTION INTRAVENOUS; SUBCUTANEOUS at 20:39

## 2024-09-06 RX ADMIN — INSULIN LISPRO 2 UNITS: 100 INJECTION, SOLUTION INTRAVENOUS; SUBCUTANEOUS at 09:30

## 2024-09-06 RX ADMIN — FAMOTIDINE 20 MG: 10 INJECTION, SOLUTION INTRAVENOUS at 09:29

## 2024-09-06 RX ADMIN — ACETAMINOPHEN 650 MG: 325 TABLET ORAL at 20:39

## 2024-09-06 RX ADMIN — INSULIN LISPRO 2 UNITS: 100 INJECTION, SOLUTION INTRAVENOUS; SUBCUTANEOUS at 13:33

## 2024-09-06 RX ADMIN — INSULIN GLARGINE 20 UNITS: 100 INJECTION, SOLUTION SUBCUTANEOUS at 20:40

## 2024-09-06 RX ADMIN — SODIUM CHLORIDE, PRESERVATIVE FREE 10 ML: 5 INJECTION INTRAVENOUS at 09:34

## 2024-09-06 RX ADMIN — INSULIN LISPRO 2 UNITS: 100 INJECTION, SOLUTION INTRAVENOUS; SUBCUTANEOUS at 12:49

## 2024-09-06 RX ADMIN — TETROFOSMIN 10.9 MILLICURIE: 1.38 INJECTION, POWDER, LYOPHILIZED, FOR SOLUTION INTRAVENOUS at 10:30

## 2024-09-06 RX ADMIN — Medication 10 MG: at 20:39

## 2024-09-06 RX ADMIN — INSULIN GLARGINE 10 UNITS: 100 INJECTION, SOLUTION SUBCUTANEOUS at 13:34

## 2024-09-06 RX ADMIN — INSULIN LISPRO 8 UNITS: 100 INJECTION, SOLUTION INTRAVENOUS; SUBCUTANEOUS at 18:18

## 2024-09-06 RX ADMIN — ATORVASTATIN CALCIUM 40 MG: 40 TABLET, FILM COATED ORAL at 20:39

## 2024-09-06 RX ADMIN — REGADENOSON 0.4 MG: 0.08 INJECTION, SOLUTION INTRAVENOUS at 11:00

## 2024-09-06 RX ADMIN — TETROFOSMIN 32 MILLICURIE: 1.38 INJECTION, POWDER, LYOPHILIZED, FOR SOLUTION INTRAVENOUS at 11:00

## 2024-09-06 RX ADMIN — SODIUM CHLORIDE, PRESERVATIVE FREE 10 ML: 5 INJECTION INTRAVENOUS at 20:40

## 2024-09-06 RX ADMIN — ASPIRIN 81 MG 81 MG: 81 TABLET ORAL at 09:30

## 2024-09-06 RX ADMIN — LEVOTHYROXINE SODIUM 25 MCG: 0.03 TABLET ORAL at 20:39

## 2024-09-06 RX ADMIN — LISINOPRIL 20 MG: 20 TABLET ORAL at 09:30

## 2024-09-06 ASSESSMENT — PAIN DESCRIPTION - LOCATION: LOCATION: HIP

## 2024-09-06 ASSESSMENT — PAIN SCALES - GENERAL
PAINLEVEL_OUTOF10: 0
PAINLEVEL_OUTOF10: 3

## 2024-09-06 ASSESSMENT — PAIN - FUNCTIONAL ASSESSMENT
PAIN_FUNCTIONAL_ASSESSMENT: ACTIVITIES ARE NOT PREVENTED
PAIN_FUNCTIONAL_ASSESSMENT: ACTIVITIES ARE NOT PREVENTED

## 2024-09-06 ASSESSMENT — PAIN DESCRIPTION - ORIENTATION: ORIENTATION: RIGHT

## 2024-09-06 ASSESSMENT — PAIN DESCRIPTION - DESCRIPTORS: DESCRIPTORS: SORE

## 2024-09-06 NOTE — PROGRESS NOTES
Dr. Carolina at bedside.New orders for lantus and increased sliding scale. Clarified lunchtime dose as pt already covered with previous sliding scale orders. New order for 2 units humalog once.

## 2024-09-06 NOTE — CARE COORDINATION
Writer reviewed chart, and spoke with MD, patient is having a stress test this day, and is agreeable to taking insulin to get BS more under control. Likely 1-2 days [facility].     Skye Soto RN

## 2024-09-06 NOTE — PLAN OF CARE
Problem: Discharge Planning  Goal: Discharge to home or other facility with appropriate resources  9/6/2024 0343 by Daisy Santiago RN  Outcome: Progressing  9/5/2024 1529 by Blayne Galaviz RN  Outcome: Progressing     Problem: Safety - Adult  Goal: Free from fall injury  9/6/2024 0343 by Daisy Santiago RN  Outcome: Progressing  9/5/2024 1529 by Blayne Galaviz RN  Outcome: Progressing     Problem: Skin/Tissue Integrity - Adult  Goal: Skin integrity remains intact  9/6/2024 0343 by Daisy Santiago RN  Outcome: Progressing  9/5/2024 1529 by Blayne Galaviz RN  Outcome: Progressing  Flowsheets (Taken 9/5/2024 0758)  Skin Integrity Remains Intact: Monitor for areas of redness and/or skin breakdown     Problem: Genitourinary - Adult  Goal: Absence of urinary retention  9/6/2024 0343 by Daisy Santiago RN  Outcome: Progressing  9/5/2024 1529 by Blayne Galaviz RN  Outcome: Progressing  Flowsheets (Taken 9/5/2024 0758)  Absence of urinary retention: Monitor intake/output and perform bladder scan as needed     Problem: Infection - Adult  Goal: Absence of infection at discharge  9/6/2024 0343 by Daisy Santiago RN  Outcome: Progressing  9/5/2024 1529 by Blayne Galaviz RN  Outcome: Progressing  Goal: Absence of infection during hospitalization  9/6/2024 0343 by Daisy Santiago RN  Outcome: Progressing  9/5/2024 1529 by Blayne Galaviz RN  Outcome: Progressing     Problem: Metabolic/Fluid and Electrolytes - Adult  Goal: Electrolytes maintained within normal limits  9/6/2024 0343 by Daisy Santiago RN  Outcome: Progressing  9/5/2024 1529 by Blayne Galaviz RN  Outcome: Progressing  Goal: Hemodynamic stability and optimal renal function maintained  9/6/2024 0343 by Daisy Santiago RN  Outcome: Progressing  9/5/2024 1529 by Blayne Galaviz RN  Outcome: Progressing  Goal: Glucose maintained within prescribed range  9/6/2024 0343 by Daisy Santiago, RN  Outcome: Progressing  9/5/2024 1529 by

## 2024-09-06 NOTE — PROGRESS NOTES
Patient's IV no longer flushing.  Let patient know she needed new IV.  Patient refused and then stated she would be ok with it.  Patient changed her mind again and stated she did not need IV.  Educated patient on the importance of an IV while in the hospital.  Patient continues to state she does not need one.  On call NP Janette aware.

## 2024-09-06 NOTE — PROGRESS NOTES
Hospital Medicine Progress Note      Date of Admission: 9/3/2024  Hospital Day: 4    Chief Admission Complaint:  Hyperglycemia    Subjective: NM Stress test completed. BS still above goal.     Telemetry (reviewed by me):  NSR    Presenting Admission History:     77 yo F presented to the ED after her PCP noted BS >500 on routine labwork. PMHx significant for DM2, HTN, Hypothyroidism. She reports she recently stopped taking Trulicity due to concern for side effects. Her most recent HbA1c was >10. She has not yet started on any other agents as she hasn't had her PCP visit to discuss; she was doing pre-visit blood work when the hyperglycemia was noted.      During ED evaluation, she was noted to have mildly elevated but flat Troponins. Upon further questioning, she acknowledged some dyspnea on exertion over the past few weeks. She also endorsed some intermittent chest discomfort, but further questioning she attributes this to a recent shingles episode.     Assessment/Plan:      Hyperglycemia, poorly controlled DM2: She reports she recently stopped taking Trulicity due to concern for side effects. Her most recent HbA1c was >10. She has not yet started on any other agents yet. Basal bolus Insulin regimen was started here.  After further discussion, she is now agreeable to an Insulin regimen at discharge given severity of hyperglycemia and HbA1c. Maybe a Lantus pen once or twice daily for simplicity.     Dyspnea on exertion, possible anginal equivalent: Denies any active chest pain, but does report recent CORTEZ. Cardiology consulted. After further discussion, she was not interested in an invasive first approach. Discussed with Cardiology. NM Stress Testing was recommended; testing is abnormal. Await further Cardiology discussion regarding next steps as she was somewhat hesitant about an angiogram.     Hypertension, benign: Controlled. Continue current medication regimen, monitor and adjust as needed.     Troponin elevation

## 2024-09-06 NOTE — PROGRESS NOTES
[facility] - Daily Progress/Follow-up Note      Admit Date:  9/3/2024    CHIEF COMPLAINT  Chest pain, elevated troponin      INTERVAL HISTORY:  Ms. Stewart was scheduled for left heart cath today but she declined.  She is more in favor of noninvasive evaluation now.  She does confirm that she had shortness of breath over the previous 2 days before presentation but today she is not able to recall any chest pain or chest tightness that she had mentioned yesterday.  She still recalls having right arm heaviness.  Currently she is comfortable and without any new cardiac symptoms.      Patient denies chest pain/heaviness/pressure, palpitations, dyspnea, orthopnea, edema, lightheadedness, syncope.    TELEMETRY: No significant arrhythmias noted overnight      REVIEW OF SYSTEMS  10 point ROS done and negative other than HPI      CARDIAC MEDICATIONS      Family, medical and social history reviewed and updated as necessary.      VITALS  /78   Pulse 72   Temp 97.7 °F (36.5 °C) (Oral)   Resp 17   Ht 1.626 m (5' 4\")   Wt 103.5 kg (228 lb 2.8 oz)   SpO2 96%   BMI 39.17 kg/m²     Intake/Output Summary (Last 24 hours) at 9/6/2024 0215  Last data filed at 9/5/2024 2006  Gross per 24 hour   Intake 150 ml   Output --   Net 150 ml       General appearance - alert, cooperative, no distress, appears stated age  Neck - Supple, symmetrical, trachea midline, no adenopathy, thyroid: not enlarged, symmetric, no tenderness/mass/nodules, no carotid bruit or JVD  Lungs - Clear to auscultation bilaterally, respirations unlabored  Chest wall - No tenderness or deformity  Heart - Regular rate and rhythm, S1, S2 normal, no murmur, no rub or gallop  Extremities - Extremities normal, atraumatic, no cyanosis or edema  Pulses - 2+ and symmetric upper and lower extremities      LABS:  Lab Results   Component Value Date/Time    WBC 7.0 09/05/2024 05:28 AM    RBC 3.26 09/05/2024 05:28 AM    HGB 10.5 09/05/2024 05:28 AM    HCT 31.0  initially reported by patient yesterday with some typical features although she denies chest discomfort before presentation on my repeat interview today but does confirm shortness of breath with exertion and right arm heaviness.  The symptoms have since resolved.  No alternative explanation for these new onset symptoms.  Also cardiac marker elevation is mild with a flat trend but without alternative explanation although could be related to hypoglycemia.  I had a lengthy discussion with the patient regarding further options and need for ischemia evaluation.  Patient in agreement with pursuing ischemia evaluation.  Given high pretest probability, it is likely reasonable to proceed with invasive evaluation for definitive testing and diagnosis.  Echo without LV dysfunction or wall motion abnormalities.  No clinical signs or symptoms of heart failure.  Currently patient is chest pain-free.  LV function preserved on echo.     Continue aspirin 81, Lipitor 40, therapeutic dose Lovenox twice daily  Continue lisinopril 20 mg daily for BP control  N.p.o. past midnight for possible Lexiscan tomorrow  Further recommendations after above cardiac testing     All questions and concerns were addressed to the patient/family. Alternatives to my treatment as well as risks and benefits of proposed treatment were discussed.      Tobacco use, if applicable, was discussed with the patient and educated on the negative effects.     Thank you for allowing to us to participate in the care or Gail Stewart. Please call with questions.            All questions and concerns were addressed to the patient/family. Alternatives to my treatment as well as risks and benefits of proposed treatment were discussed and understood by patient/family.     Thank you for the consult, please call with questions.    Victoriano Lima MD, FAC, [facility]  Interventional Cardiology  [facility]  [phone] (c)  9/6/2024       Inadvertent computerized

## 2024-09-07 LAB
ANION GAP SERPL CALCULATED.3IONS-SCNC: 9 MMOL/L (ref 3–16)
BUN SERPL-MCNC: 30 MG/DL (ref 7–20)
CALCIUM SERPL-MCNC: 9.5 MG/DL (ref 8.3–10.6)
CHLORIDE SERPL-SCNC: 100 MMOL/L (ref 99–110)
CO2 SERPL-SCNC: 26 MMOL/L (ref 21–32)
CREAT SERPL-MCNC: 1.1 MG/DL (ref 0.6–1.2)
GFR SERPLBLD CREATININE-BSD FMLA CKD-EPI: 51 ML/MIN/{1.73_M2}
GLUCOSE BLD-MCNC: 249 MG/DL (ref 70–99)
GLUCOSE BLD-MCNC: 264 MG/DL (ref 70–99)
GLUCOSE BLD-MCNC: 283 MG/DL (ref 70–99)
GLUCOSE BLD-MCNC: 291 MG/DL (ref 70–99)
GLUCOSE BLD-MCNC: 357 MG/DL (ref 70–99)
GLUCOSE SERPL-MCNC: 272 MG/DL (ref 70–99)
PERFORMED ON: ABNORMAL
POTASSIUM SERPL-SCNC: 4.4 MMOL/L (ref 3.5–5.1)
SODIUM SERPL-SCNC: 135 MMOL/L (ref 136–145)

## 2024-09-07 PROCEDURE — 99232 SBSQ HOSP IP/OBS MODERATE 35: CPT | Performed by: NURSE PRACTITIONER

## 2024-09-07 PROCEDURE — 1200000000 HC SEMI PRIVATE

## 2024-09-07 PROCEDURE — 2580000003 HC RX 258: Performed by: INTERNAL MEDICINE

## 2024-09-07 PROCEDURE — 36415 COLL VENOUS BLD VENIPUNCTURE: CPT

## 2024-09-07 PROCEDURE — 6370000000 HC RX 637 (ALT 250 FOR IP): Performed by: INTERNAL MEDICINE

## 2024-09-07 PROCEDURE — 80048 BASIC METABOLIC PNL TOTAL CA: CPT

## 2024-09-07 PROCEDURE — 6370000000 HC RX 637 (ALT 250 FOR IP): Performed by: NURSE PRACTITIONER

## 2024-09-07 PROCEDURE — 2500000003 HC RX 250 WO HCPCS: Performed by: INTERNAL MEDICINE

## 2024-09-07 RX ORDER — FAMOTIDINE 20 MG/1
20 TABLET, FILM COATED ORAL 2 TIMES DAILY
Status: DISCONTINUED | OUTPATIENT
Start: 2024-09-07 | End: 2024-09-11

## 2024-09-07 RX ORDER — METOPROLOL SUCCINATE 25 MG/1
25 TABLET, EXTENDED RELEASE ORAL DAILY
Status: DISCONTINUED | OUTPATIENT
Start: 2024-09-07 | End: 2024-09-11 | Stop reason: HOSPADM

## 2024-09-07 RX ADMIN — SODIUM CHLORIDE, PRESERVATIVE FREE 10 ML: 5 INJECTION INTRAVENOUS at 08:38

## 2024-09-07 RX ADMIN — FAMOTIDINE 20 MG: 20 TABLET, FILM COATED ORAL at 20:43

## 2024-09-07 RX ADMIN — INSULIN LISPRO 4 UNITS: 100 INJECTION, SOLUTION INTRAVENOUS; SUBCUTANEOUS at 12:25

## 2024-09-07 RX ADMIN — ATORVASTATIN CALCIUM 40 MG: 40 TABLET, FILM COATED ORAL at 20:44

## 2024-09-07 RX ADMIN — INSULIN GLARGINE 20 UNITS: 100 INJECTION, SOLUTION SUBCUTANEOUS at 20:53

## 2024-09-07 RX ADMIN — SODIUM CHLORIDE, PRESERVATIVE FREE 10 ML: 5 INJECTION INTRAVENOUS at 20:44

## 2024-09-07 RX ADMIN — METOPROLOL SUCCINATE 25 MG: 25 TABLET, EXTENDED RELEASE ORAL at 08:36

## 2024-09-07 RX ADMIN — LEVOTHYROXINE SODIUM 25 MCG: 0.03 TABLET ORAL at 20:43

## 2024-09-07 RX ADMIN — FAMOTIDINE 20 MG: 10 INJECTION, SOLUTION INTRAVENOUS at 08:37

## 2024-09-07 RX ADMIN — INSULIN GLARGINE 10 UNITS: 100 INJECTION, SOLUTION SUBCUTANEOUS at 08:37

## 2024-09-07 RX ADMIN — INSULIN LISPRO 8 UNITS: 100 INJECTION, SOLUTION INTRAVENOUS; SUBCUTANEOUS at 16:58

## 2024-09-07 RX ADMIN — Medication 10 MG: at 20:43

## 2024-09-07 RX ADMIN — INSULIN LISPRO 4 UNITS: 100 INJECTION, SOLUTION INTRAVENOUS; SUBCUTANEOUS at 08:37

## 2024-09-07 RX ADMIN — LISINOPRIL 20 MG: 20 TABLET ORAL at 08:36

## 2024-09-07 RX ADMIN — ASPIRIN 81 MG 81 MG: 81 TABLET ORAL at 08:36

## 2024-09-07 ASSESSMENT — PAIN SCALES - GENERAL: PAINLEVEL_OUTOF10: 0

## 2024-09-07 NOTE — PROGRESS NOTES
[facility] - Daily Progress/Follow-up Note      Admit Date:  9/3/2024    CHIEF COMPLAINT  Chest pain, elevated troponin      INTERVAL HISTORY:  Ms. Stewart was scheduled for left heart cath yesterday but she declined.  She was more in favor of noninvasive evaluation for now so she underwent pharmacologic stress testing this morning that has resulted abnormal.  She does confirm that she had shortness of breath over the previous 2 days before presentation but she is not able to recall any chest pain or chest tightness that she had mentioned initially on admission.  She still recalls having right arm heaviness.  She had recurrent right arm heaviness this morning which is now resolved.  Currently she is comfortable and without any new cardiac symptoms.  I had a lengthy discussion with her regarding next steps.  We discussed proceeding with coronary angiogram later this admission.  In addition, I offered her invasive evaluation at a later stage and medical management for now for presumed CAD given absence of high risk findings on the stress test.  She remains unsure of what approach she would like to take and wants to think about further steps night and discuss with us further tomorrow morning.    Patient denies chest pain/heaviness/pressure, palpitations, dyspnea, orthopnea, edema, lightheadedness, syncope.    TELEMETRY: No significant arrhythmias noted overnight      REVIEW OF SYSTEMS  10 point ROS done and negative other than HPI      CARDIAC MEDICATIONS  Aspirin 81  Lipitor 40  Lisinopril 20    Family, medical and social history reviewed and updated as necessary.      VITALS  /68   Pulse 81   Temp 98 °F (36.7 °C) (Oral)   Resp 16   Ht 1.626 m (5' 4\")   Wt 103.5 kg (228 lb 2.8 oz)   SpO2 95%   BMI 39.17 kg/m²     Intake/Output Summary (Last 24 hours) at 9/7/2024 0112  Last data filed at 9/6/2024 1533  Gross per 24 hour   Intake 480 ml   Output --   Net 480 ml       General appearance - alert,

## 2024-09-07 NOTE — PLAN OF CARE
Problem: Discharge Planning  Goal: Discharge to home or other facility with appropriate resources  Outcome: Progressing     Problem: Safety - Adult  Goal: Free from fall injury  Outcome: Progressing     Problem: Skin/Tissue Integrity - Adult  Goal: Skin integrity remains intact  Outcome: Progressing  Flowsheets (Taken 9/6/2024 1016 by Mayda Craig RN)  Skin Integrity Remains Intact: Monitor for areas of redness and/or skin breakdown     Problem: Genitourinary - Adult  Goal: Absence of urinary retention  Outcome: Progressing     Problem: Infection - Adult  Goal: Absence of infection at discharge  Outcome: Progressing  Goal: Absence of infection during hospitalization  Outcome: Progressing     Problem: Metabolic/Fluid and Electrolytes - Adult  Goal: Electrolytes maintained within normal limits  Outcome: Progressing  Goal: Hemodynamic stability and optimal renal function maintained  Outcome: Progressing  Goal: Glucose maintained within prescribed range  Outcome: Progressing     Problem: Chronic Conditions and Co-morbidities  Goal: Patient's chronic conditions and co-morbidity symptoms are monitored and maintained or improved  Outcome: Progressing     Problem: Pain  Goal: Verbalizes/displays adequate comfort level or baseline comfort level  Outcome: Progressing

## 2024-09-07 NOTE — PROGRESS NOTES
Patient is willing for angiogram, follow cardiology recommendations.    Hypertension, benign: Controlled. Continue current medication regimen, monitor and adjust as needed.     Troponin elevation - NOT c/w myocardial injury 2nd to demand ischemia in setting of RANJEET.     RANJEET/CKD Stage 3: ARF/CKD - baseline stage 3 w/ elevated BUN/Cr ratio c/w pre-renal azotemia. Improved. Monitor.     HypoThyroid - clinically euthyroid on oral replacement therapy. Continue, w/ outpt monitoring as previously arranged.    Morbid Obesity -  With Body mass index is 38.6 kg/m². Complicating assessment and treatment. Placing patient at risk for multiple co-morbidities as well as early death and contributing to the patient's presentation.         Physical Exam Performed:      General appearance:  No apparent distress. Obese.   Respiratory:  Normal respiratory effort. Clear.   Cardiovascular:  Regular rate and rhythm.  Abdomen:  Soft, non-tender, non-distended.  Musculoskelatal:  No edema  Neurologic:  Non-focal  Psychiatric:  Alert and oriented    /81   Pulse 78   Temp 98 °F (36.7 °C) (Oral)   Resp 15   Ht 1.626 m (5' 4\")   Wt 102 kg (224 lb 13.9 oz)   SpO2 97%   BMI 38.60 kg/m²     Diet: ADULT ORAL NUTRITION SUPPLEMENT; Dinner, Lunch, Breakfast; Standard High Calorie/High Protein Oral Supplement  ADULT DIET; Regular; 4 carb choices (60 gm/meal)    DVT Prophylaxis: []Tx dose LMWH  []SQ Heparin  [x]IPC/SCDs  []Eliquis  []Xarelto  []Coumadin  []Other -      Code status: Full Code    PT/OT Eval Status:   []NOT yet ordered  []Ordered and Pending   []Seen with Recommendations for:  []Home independently  []Home w/ assist  []HHC  []SNF  []Acute Rehab    Anticipated Discharge Day/Date:  Pending improved BS control and pending any cardiology plans for further ischemic evaluation.     Anticipated Discharge Location: [x]Home  []HHC  []SNF  []Acute Rehab  []LTAC  []Hospice  []Other -      Consults:      IP CONSULT TO HOSPITALIST  IP CONSULT  3)  [] Collateral history obtained from:    [x] All available Consultant notes from yesterday/today were reviewed  [x] All current labs were reviewed and interpreted for clinical significance   [x] Appropriate follow-up labs were ordered    Medications:  Personally reviewed in detail in conjunction w/ labs as documented for evidence of drug toxicity.     Infusion Medications    sodium chloride      dextrose       Scheduled Medications    metoprolol succinate  25 mg Oral Daily    insulin glargine  10 Units SubCUTAneous Daily    insulin lispro  0-8 Units SubCUTAneous TID WC    insulin lispro  0-4 Units SubCUTAneous Nightly    insulin glargine  20 Units SubCUTAneous Nightly    melatonin  10 mg Oral Nightly    sodium chloride flush  5-40 mL IntraVENous 2 times per day    aspirin  81 mg Oral Daily    atorvastatin  40 mg Oral Nightly    famotidine (PEPCID) injection  20 mg IntraVENous BID    levothyroxine  25 mcg Oral Daily    lisinopril  20 mg Oral Daily     PRN Meds: perflutren lipid microspheres, sodium chloride flush, sodium chloride, ondansetron **OR** ondansetron, acetaminophen **OR** acetaminophen, polyethylene glycol, nitroGLYCERIN, cyclobenzaprine, glucose, dextrose bolus **OR** dextrose bolus, glucagon (rDNA), dextrose     Labs:  Personally reviewed and interpreted for clinical significance.     Recent Labs     09/05/24  0528   WBC 7.0   HGB 10.5*   HCT 31.0*        Recent Labs     09/05/24  0529 09/07/24  0535    135*   K 4.1 4.4    100   CO2 24 26   BUN 21* 30*   CREATININE 1.0 1.1   CALCIUM 9.0 9.5   PHOS 3.1  --      No results for input(s): \"PROBNP\", \"TROPHS\" in the last 72 hours.    No results for input(s): \"LABA1C\" in the last 72 hours.    No results for input(s): \"AST\", \"ALT\", \"BILIDIR\", \"BILITOT\", \"ALKPHOS\" in the last 72 hours.    No results for input(s): \"INR\", \"LACTA\", \"TSH\" in the last 72 hours.    Urine Cultures:   Lab Results   Component Value Date/Time    LABURIN  09/03/2024

## 2024-09-07 NOTE — PROGRESS NOTES
[facility]   Daily Progress Note      Admit Date:  9/3/2024    Reason for follow up visit: Chest pain; Elevated troponin    CC: \"I'm going to have my heart procedure on Monday.\"    78 y.o. patient with history of hypertension, hyperlipidemia, DVT, diabetes who presented to the hospital with complaints of new onset chest pain and shortness of breath as well as hyperglycemia.  She reports 1 day history of pressure-like chest discomfort as well as worsened shortness of breath with exertion compared to baseline. Plan for cardiac cath on 9/9/24.    Interval History:  Pt. seen and examined; records reviewed  /78  Remains on room air  Wt today 224#.   Denies further chest pain  Denies SOB, cough, palpitations or dizziness  Up in room without complaints.    Subjective:  Pt with no acute overnight cardiac events.     Objective:   BP (!) 140/78   Pulse 68   Temp 97.6 °F (36.4 °C) (Oral)   Resp 16   Ht 1.626 m (5' 4\")   Wt 102 kg (224 lb 13.9 oz)   SpO2 95%   BMI 38.60 kg/m²     Intake/Output Summary (Last 24 hours) at 9/7/2024 1318  Last data filed at 9/6/2024 1533  Gross per 24 hour   Intake 480 ml   Output --   Net 480 ml     Wt Readings from Last 3 Encounters:   09/07/24 102 kg (224 lb 13.9 oz)   07/15/21 101.1 kg (222 lb 14.2 oz)   07/11/20 103.5 kg (228 lb 2.8 oz)       Physical Exam:  General: In no acute distress. Awake, alert, and oriented X4. Elderly and resting in bed  Skin:  Warm and dry.   Neck:  Supple. No JVD appreciated.  Chest: Lungs clear to auscultation. No wheezes/rhonchi/rales  Cardiovascular:  RRR.  Normal S1 and S2. Soft systolic murmur  Abdomen:  Soft, nontender, +bowel sounds.   Extremities:  No LE edema. No clubbing or cyanosis. 2+ bilateral radial/DP pulses. Cap refill brisk    Medications:    metoprolol succinate  25 mg Oral Daily    insulin glargine  10 Units SubCUTAneous Daily    insulin lispro  0-8 Units SubCUTAneous TID WC    insulin lispro  0-4 Units SubCUTAneous Nightly

## 2024-09-08 LAB
ANION GAP SERPL CALCULATED.3IONS-SCNC: 9 MMOL/L (ref 3–16)
BUN SERPL-MCNC: 35 MG/DL (ref 7–20)
CALCIUM SERPL-MCNC: 9.5 MG/DL (ref 8.3–10.6)
CHLORIDE SERPL-SCNC: 101 MMOL/L (ref 99–110)
CO2 SERPL-SCNC: 25 MMOL/L (ref 21–32)
CREAT SERPL-MCNC: 1.2 MG/DL (ref 0.6–1.2)
GFR SERPLBLD CREATININE-BSD FMLA CKD-EPI: 46 ML/MIN/{1.73_M2}
GLUCOSE BLD-MCNC: 263 MG/DL (ref 70–99)
GLUCOSE BLD-MCNC: 282 MG/DL (ref 70–99)
GLUCOSE BLD-MCNC: 291 MG/DL (ref 70–99)
GLUCOSE BLD-MCNC: 308 MG/DL (ref 70–99)
GLUCOSE SERPL-MCNC: 259 MG/DL (ref 70–99)
PERFORMED ON: ABNORMAL
POTASSIUM SERPL-SCNC: 4.2 MMOL/L (ref 3.5–5.1)
SODIUM SERPL-SCNC: 135 MMOL/L (ref 136–145)

## 2024-09-08 PROCEDURE — 6370000000 HC RX 637 (ALT 250 FOR IP): Performed by: INTERNAL MEDICINE

## 2024-09-08 PROCEDURE — 80048 BASIC METABOLIC PNL TOTAL CA: CPT

## 2024-09-08 PROCEDURE — 6370000000 HC RX 637 (ALT 250 FOR IP): Performed by: STUDENT IN AN ORGANIZED HEALTH CARE EDUCATION/TRAINING PROGRAM

## 2024-09-08 PROCEDURE — 1200000000 HC SEMI PRIVATE

## 2024-09-08 PROCEDURE — 6370000000 HC RX 637 (ALT 250 FOR IP): Performed by: NURSE PRACTITIONER

## 2024-09-08 PROCEDURE — 2580000003 HC RX 258: Performed by: INTERNAL MEDICINE

## 2024-09-08 RX ORDER — INSULIN GLARGINE 100 [IU]/ML
20 INJECTION, SOLUTION SUBCUTANEOUS DAILY
Status: DISCONTINUED | OUTPATIENT
Start: 2024-09-08 | End: 2024-09-09

## 2024-09-08 RX ADMIN — INSULIN LISPRO 4 UNITS: 100 INJECTION, SOLUTION INTRAVENOUS; SUBCUTANEOUS at 09:12

## 2024-09-08 RX ADMIN — ASPIRIN 81 MG 81 MG: 81 TABLET ORAL at 09:12

## 2024-09-08 RX ADMIN — LEVOTHYROXINE SODIUM 25 MCG: 0.03 TABLET ORAL at 20:20

## 2024-09-08 RX ADMIN — ATORVASTATIN CALCIUM 40 MG: 40 TABLET, FILM COATED ORAL at 20:20

## 2024-09-08 RX ADMIN — SODIUM CHLORIDE, PRESERVATIVE FREE 10 ML: 5 INJECTION INTRAVENOUS at 21:05

## 2024-09-08 RX ADMIN — LISINOPRIL 20 MG: 20 TABLET ORAL at 09:12

## 2024-09-08 RX ADMIN — FAMOTIDINE 20 MG: 20 TABLET, FILM COATED ORAL at 20:20

## 2024-09-08 RX ADMIN — INSULIN LISPRO 6 UNITS: 100 INJECTION, SOLUTION INTRAVENOUS; SUBCUTANEOUS at 11:41

## 2024-09-08 RX ADMIN — INSULIN LISPRO 4 UNITS: 100 INJECTION, SOLUTION INTRAVENOUS; SUBCUTANEOUS at 17:03

## 2024-09-08 RX ADMIN — INSULIN GLARGINE 20 UNITS: 100 INJECTION, SOLUTION SUBCUTANEOUS at 20:24

## 2024-09-08 RX ADMIN — INSULIN GLARGINE 20 UNITS: 100 INJECTION, SOLUTION SUBCUTANEOUS at 09:12

## 2024-09-08 RX ADMIN — METOPROLOL SUCCINATE 25 MG: 25 TABLET, EXTENDED RELEASE ORAL at 09:12

## 2024-09-08 RX ADMIN — Medication 10 MG: at 21:04

## 2024-09-08 RX ADMIN — SODIUM CHLORIDE, PRESERVATIVE FREE 10 ML: 5 INJECTION INTRAVENOUS at 09:13

## 2024-09-08 RX ADMIN — FAMOTIDINE 20 MG: 20 TABLET, FILM COATED ORAL at 09:12

## 2024-09-08 NOTE — PROGRESS NOTES
Hospital Medicine Progress Note      Date of Admission: 9/3/2024  Hospital Day: 6    Chief Admission Complaint:  Hyperglycemia    Subjective: Patient seen and examined at bedside.  Patient states that she feeling fine but still her blood sugar is elevated and she is willing for cardiac cath on Monday.    Telemetry (reviewed by me):  NSR    Presenting Admission History:     77 yo F presented to the ED after her PCP noted BS >500 on routine labwork. PMHx significant for DM2, HTN, Hypothyroidism. She reports she recently stopped taking Trulicity due to concern for side effects. Her most recent HbA1c was >10. She has not yet started on any other agents as she hasn't had her PCP visit to discuss; she was doing pre-visit blood work when the hyperglycemia was noted.      During ED evaluation, she was noted to have mildly elevated but flat Troponins. Upon further questioning, she acknowledged some dyspnea on exertion over the past few weeks. She also endorsed some intermittent chest discomfort, but further questioning she attributes this to a recent shingles episode.     Assessment/Plan:      Hyperglycemia, poorly controlled DM2: She reports she recently stopped taking Trulicity due to concern for side effects. Her most recent HbA1c was >10. She has not yet started on any other agents yet. Basal bolus Insulin regimen was started here.  After further discussion, she is now agreeable to an Insulin regimen at discharge given severity of hyperglycemia and HbA1c. Maybe a Lantus pen once or twice daily for simplicity.  Increase Lantus to 20 units 2 times daily along with sliding scale insulin continue to monitor    Dyspnea on exertion, possible anginal equivalent: Denies any active chest pain, but does report recent CORTEZ. Cardiology consulted. After further discussion, she was not interested in an invasive first approach. Discussed with Cardiology. NM Stress Testing was recommended; testing is abnormal.  Patient is willing for  yesterday/today were reviewed  [x] All current labs were reviewed and interpreted for clinical significance   [x] Appropriate follow-up labs were ordered    Medications:  Personally reviewed in detail in conjunction w/ labs as documented for evidence of drug toxicity.     Infusion Medications    sodium chloride      dextrose       Scheduled Medications    metoprolol succinate  25 mg Oral Daily    famotidine  20 mg Oral BID    insulin glargine  10 Units SubCUTAneous Daily    insulin lispro  0-8 Units SubCUTAneous TID WC    insulin lispro  0-4 Units SubCUTAneous Nightly    insulin glargine  20 Units SubCUTAneous Nightly    melatonin  10 mg Oral Nightly    sodium chloride flush  5-40 mL IntraVENous 2 times per day    aspirin  81 mg Oral Daily    atorvastatin  40 mg Oral Nightly    levothyroxine  25 mcg Oral Daily    lisinopril  20 mg Oral Daily     PRN Meds: perflutren lipid microspheres, sodium chloride flush, sodium chloride, ondansetron **OR** ondansetron, acetaminophen **OR** acetaminophen, polyethylene glycol, nitroGLYCERIN, cyclobenzaprine, glucose, dextrose bolus **OR** dextrose bolus, glucagon (rDNA), dextrose     Labs:  Personally reviewed and interpreted for clinical significance.     No results for input(s): \"WBC\", \"HGB\", \"HCT\", \"PLT\" in the last 72 hours.    Recent Labs     09/07/24  0535 09/08/24  0616   * 135*   K 4.4 4.2    101   CO2 26 25   BUN 30* 35*   CREATININE 1.1 1.2   CALCIUM 9.5 9.5     No results for input(s): \"PROBNP\", \"TROPHS\" in the last 72 hours.    No results for input(s): \"LABA1C\" in the last 72 hours.    No results for input(s): \"AST\", \"ALT\", \"BILIDIR\", \"BILITOT\", \"ALKPHOS\" in the last 72 hours.    No results for input(s): \"INR\", \"LACTA\", \"TSH\" in the last 72 hours.    Urine Cultures:   Lab Results   Component Value Date/Time    LABURIN  09/03/2024 10:22 AM     <10,000 CFU/ml mixed skin/urogenital gasper. No further workup     Blood Cultures: No results found for: \"BC\"  No

## 2024-09-09 PROBLEM — R79.89 ELEVATED TROPONIN: Status: ACTIVE | Noted: 2024-09-03

## 2024-09-09 PROBLEM — R94.39 ABNORMAL CARDIOVASCULAR STRESS TEST: Status: ACTIVE | Noted: 2024-09-09

## 2024-09-09 LAB
ANION GAP SERPL CALCULATED.3IONS-SCNC: 10 MMOL/L (ref 3–16)
BASOPHILS # BLD: 0.1 K/UL (ref 0–0.2)
BASOPHILS NFR BLD: 1.1 %
BUN SERPL-MCNC: 35 MG/DL (ref 7–20)
CALCIUM SERPL-MCNC: 9.1 MG/DL (ref 8.3–10.6)
CHLORIDE SERPL-SCNC: 103 MMOL/L (ref 99–110)
CO2 SERPL-SCNC: 22 MMOL/L (ref 21–32)
CREAT SERPL-MCNC: 1 MG/DL (ref 0.6–1.2)
DEPRECATED RDW RBC AUTO: 12.9 % (ref 12.4–15.4)
ECHO BSA: 2.16 M2
EKG ATRIAL RATE: 66 BPM
EKG DIAGNOSIS: NORMAL
EKG P AXIS: 49 DEGREES
EKG P-R INTERVAL: 224 MS
EKG Q-T INTERVAL: 414 MS
EKG QRS DURATION: 122 MS
EKG QTC CALCULATION (BAZETT): 434 MS
EKG R AXIS: -42 DEGREES
EKG T AXIS: 24 DEGREES
EKG VENTRICULAR RATE: 66 BPM
EOSINOPHIL # BLD: 0.2 K/UL (ref 0–0.6)
EOSINOPHIL NFR BLD: 2 %
GFR SERPLBLD CREATININE-BSD FMLA CKD-EPI: 57 ML/MIN/{1.73_M2}
GLUCOSE BLD-MCNC: 159 MG/DL (ref 70–99)
GLUCOSE BLD-MCNC: 210 MG/DL (ref 70–99)
GLUCOSE BLD-MCNC: 251 MG/DL (ref 70–99)
GLUCOSE BLD-MCNC: 260 MG/DL (ref 70–99)
GLUCOSE BLD-MCNC: 307 MG/DL (ref 70–99)
GLUCOSE BLD-MCNC: 373 MG/DL (ref 70–99)
GLUCOSE SERPL-MCNC: 284 MG/DL (ref 70–99)
HCT VFR BLD AUTO: 31.9 % (ref 36–48)
HGB BLD-MCNC: 10.7 G/DL (ref 12–16)
LYMPHOCYTES # BLD: 2.1 K/UL (ref 1–5.1)
LYMPHOCYTES NFR BLD: 20.7 %
MCH RBC QN AUTO: 32.6 PG (ref 26–34)
MCHC RBC AUTO-ENTMCNC: 33.6 G/DL (ref 31–36)
MCV RBC AUTO: 96.9 FL (ref 80–100)
MONOCYTES # BLD: 0.8 K/UL (ref 0–1.3)
MONOCYTES NFR BLD: 8.5 %
NEUTROPHILS # BLD: 6.7 K/UL (ref 1.7–7.7)
NEUTROPHILS NFR BLD: 67.7 %
PERFORMED ON: ABNORMAL
PLATELET # BLD AUTO: 177 K/UL (ref 135–450)
PMV BLD AUTO: 9.3 FL (ref 5–10.5)
POTASSIUM SERPL-SCNC: 4.4 MMOL/L (ref 3.5–5.1)
RBC # BLD AUTO: 3.29 M/UL (ref 4–5.2)
SODIUM SERPL-SCNC: 135 MMOL/L (ref 136–145)
WBC # BLD AUTO: 9.9 K/UL (ref 4–11)

## 2024-09-09 PROCEDURE — 6370000000 HC RX 637 (ALT 250 FOR IP): Performed by: INTERNAL MEDICINE

## 2024-09-09 PROCEDURE — C1894 INTRO/SHEATH, NON-LASER: HCPCS | Performed by: INTERNAL MEDICINE

## 2024-09-09 PROCEDURE — 80048 BASIC METABOLIC PNL TOTAL CA: CPT

## 2024-09-09 PROCEDURE — 36415 COLL VENOUS BLD VENIPUNCTURE: CPT

## 2024-09-09 PROCEDURE — B2111ZZ FLUOROSCOPY OF MULTIPLE CORONARY ARTERIES USING LOW OSMOLAR CONTRAST: ICD-10-PCS | Performed by: INTERNAL MEDICINE

## 2024-09-09 PROCEDURE — 7100000011 HC PHASE II RECOVERY - ADDTL 15 MIN: Performed by: INTERNAL MEDICINE

## 2024-09-09 PROCEDURE — 2580000003 HC RX 258: Performed by: INTERNAL MEDICINE

## 2024-09-09 PROCEDURE — 93458 L HRT ARTERY/VENTRICLE ANGIO: CPT | Performed by: INTERNAL MEDICINE

## 2024-09-09 PROCEDURE — 93010 ELECTROCARDIOGRAM REPORT: CPT | Performed by: INTERNAL MEDICINE

## 2024-09-09 PROCEDURE — 2709999900 HC NON-CHARGEABLE SUPPLY: Performed by: INTERNAL MEDICINE

## 2024-09-09 PROCEDURE — 6360000002 HC RX W HCPCS: Performed by: INTERNAL MEDICINE

## 2024-09-09 PROCEDURE — C1769 GUIDE WIRE: HCPCS | Performed by: INTERNAL MEDICINE

## 2024-09-09 PROCEDURE — 7100000010 HC PHASE II RECOVERY - FIRST 15 MIN: Performed by: INTERNAL MEDICINE

## 2024-09-09 PROCEDURE — 99222 1ST HOSP IP/OBS MODERATE 55: CPT | Performed by: INTERNAL MEDICINE

## 2024-09-09 PROCEDURE — 6370000000 HC RX 637 (ALT 250 FOR IP): Performed by: NURSE PRACTITIONER

## 2024-09-09 PROCEDURE — 99152 MOD SED SAME PHYS/QHP 5/>YRS: CPT | Performed by: INTERNAL MEDICINE

## 2024-09-09 PROCEDURE — 4A023N7 MEASUREMENT OF CARDIAC SAMPLING AND PRESSURE, LEFT HEART, PERCUTANEOUS APPROACH: ICD-10-PCS | Performed by: INTERNAL MEDICINE

## 2024-09-09 PROCEDURE — 6360000004 HC RX CONTRAST MEDICATION: Performed by: INTERNAL MEDICINE

## 2024-09-09 PROCEDURE — 2500000003 HC RX 250 WO HCPCS: Performed by: INTERNAL MEDICINE

## 2024-09-09 PROCEDURE — 93005 ELECTROCARDIOGRAM TRACING: CPT | Performed by: INTERNAL MEDICINE

## 2024-09-09 PROCEDURE — 85025 COMPLETE CBC W/AUTO DIFF WBC: CPT

## 2024-09-09 PROCEDURE — 1200000000 HC SEMI PRIVATE

## 2024-09-09 PROCEDURE — B2151ZZ FLUOROSCOPY OF LEFT HEART USING LOW OSMOLAR CONTRAST: ICD-10-PCS | Performed by: INTERNAL MEDICINE

## 2024-09-09 RX ORDER — SODIUM CHLORIDE 0.9 % (FLUSH) 0.9 %
5-40 SYRINGE (ML) INJECTION PRN
Status: DISCONTINUED | OUTPATIENT
Start: 2024-09-09 | End: 2024-09-10 | Stop reason: SDUPTHER

## 2024-09-09 RX ORDER — MIDAZOLAM HYDROCHLORIDE 1 MG/ML
INJECTION INTRAMUSCULAR; INTRAVENOUS PRN
Status: DISCONTINUED | OUTPATIENT
Start: 2024-09-09 | End: 2024-09-09 | Stop reason: HOSPADM

## 2024-09-09 RX ORDER — FENTANYL CITRATE 50 UG/ML
INJECTION, SOLUTION INTRAMUSCULAR; INTRAVENOUS PRN
Status: DISCONTINUED | OUTPATIENT
Start: 2024-09-09 | End: 2024-09-09 | Stop reason: HOSPADM

## 2024-09-09 RX ORDER — INSULIN GLARGINE 100 [IU]/ML
30 INJECTION, SOLUTION SUBCUTANEOUS NIGHTLY
Qty: 10 ML | Refills: 3 | Status: SHIPPED | OUTPATIENT
Start: 2024-09-10 | End: 2024-09-11

## 2024-09-09 RX ORDER — SODIUM CHLORIDE 9 MG/ML
INJECTION, SOLUTION INTRAVENOUS CONTINUOUS
Status: ACTIVE | OUTPATIENT
Start: 2024-09-09 | End: 2024-09-09

## 2024-09-09 RX ORDER — SODIUM CHLORIDE 0.9 % (FLUSH) 0.9 %
5-40 SYRINGE (ML) INJECTION EVERY 12 HOURS SCHEDULED
Status: DISCONTINUED | OUTPATIENT
Start: 2024-09-09 | End: 2024-09-10 | Stop reason: SDUPTHER

## 2024-09-09 RX ORDER — RANOLAZINE 500 MG/1
500 TABLET, EXTENDED RELEASE ORAL 2 TIMES DAILY
Status: DISCONTINUED | OUTPATIENT
Start: 2024-09-09 | End: 2024-09-11 | Stop reason: HOSPADM

## 2024-09-09 RX ORDER — INSULIN GLARGINE 100 [IU]/ML
35 INJECTION, SOLUTION SUBCUTANEOUS DAILY
Qty: 10 ML | Refills: 3 | Status: SHIPPED | OUTPATIENT
Start: 2024-09-10 | End: 2024-09-11

## 2024-09-09 RX ORDER — RANOLAZINE 500 MG/1
500 TABLET, EXTENDED RELEASE ORAL 2 TIMES DAILY
Qty: 60 TABLET | Refills: 3 | Status: SHIPPED | OUTPATIENT
Start: 2024-09-09 | End: 2024-09-11

## 2024-09-09 RX ORDER — INSULIN GLARGINE 100 [IU]/ML
35 INJECTION, SOLUTION SUBCUTANEOUS DAILY
Status: DISCONTINUED | OUTPATIENT
Start: 2024-09-10 | End: 2024-09-11 | Stop reason: HOSPADM

## 2024-09-09 RX ORDER — SODIUM CHLORIDE 9 MG/ML
INJECTION, SOLUTION INTRAVENOUS PRN
Status: DISCONTINUED | OUTPATIENT
Start: 2024-09-09 | End: 2024-09-10 | Stop reason: SDUPTHER

## 2024-09-09 RX ORDER — SODIUM CHLORIDE, SODIUM LACTATE, POTASSIUM CHLORIDE, CALCIUM CHLORIDE 600; 310; 30; 20 MG/100ML; MG/100ML; MG/100ML; MG/100ML
INJECTION, SOLUTION INTRAVENOUS CONTINUOUS
Status: ACTIVE | OUTPATIENT
Start: 2024-09-09 | End: 2024-09-10

## 2024-09-09 RX ORDER — INSULIN GLARGINE 100 [IU]/ML
25 INJECTION, SOLUTION SUBCUTANEOUS DAILY
Status: DISCONTINUED | OUTPATIENT
Start: 2024-09-10 | End: 2024-09-09

## 2024-09-09 RX ORDER — ATORVASTATIN CALCIUM 80 MG/1
80 TABLET, FILM COATED ORAL NIGHTLY
Status: DISCONTINUED | OUTPATIENT
Start: 2024-09-10 | End: 2024-09-11 | Stop reason: HOSPADM

## 2024-09-09 RX ORDER — ONDANSETRON 2 MG/ML
4 INJECTION INTRAMUSCULAR; INTRAVENOUS EVERY 6 HOURS PRN
Status: DISCONTINUED | OUTPATIENT
Start: 2024-09-09 | End: 2024-09-10 | Stop reason: SDUPTHER

## 2024-09-09 RX ORDER — IOPAMIDOL 755 MG/ML
INJECTION, SOLUTION INTRAVASCULAR PRN
Status: DISCONTINUED | OUTPATIENT
Start: 2024-09-09 | End: 2024-09-09 | Stop reason: HOSPADM

## 2024-09-09 RX ORDER — HYDRALAZINE HYDROCHLORIDE 20 MG/ML
10 INJECTION INTRAMUSCULAR; INTRAVENOUS EVERY 10 MIN PRN
Status: DISCONTINUED | OUTPATIENT
Start: 2024-09-09 | End: 2024-09-11 | Stop reason: HOSPADM

## 2024-09-09 RX ORDER — INSULIN LISPRO 100 [IU]/ML
17 INJECTION, SOLUTION INTRAVENOUS; SUBCUTANEOUS
Status: DISCONTINUED | OUTPATIENT
Start: 2024-09-10 | End: 2024-09-11

## 2024-09-09 RX ORDER — INSULIN GLARGINE 100 [IU]/ML
25 INJECTION, SOLUTION SUBCUTANEOUS DAILY
Qty: 10 ML | Refills: 0 | Status: SHIPPED | OUTPATIENT
Start: 2024-09-10 | End: 2024-10-10

## 2024-09-09 RX ORDER — ASPIRIN 325 MG
325 TABLET ORAL ONCE
Status: COMPLETED | OUTPATIENT
Start: 2024-09-09 | End: 2024-09-09

## 2024-09-09 RX ORDER — INSULIN GLARGINE 100 [IU]/ML
30 INJECTION, SOLUTION SUBCUTANEOUS NIGHTLY
Status: DISCONTINUED | OUTPATIENT
Start: 2024-09-10 | End: 2024-09-11 | Stop reason: HOSPADM

## 2024-09-09 RX ORDER — LORAZEPAM 0.5 MG/1
0.5 TABLET ORAL
Status: ACTIVE | OUTPATIENT
Start: 2024-09-09 | End: 2024-09-10

## 2024-09-09 RX ADMIN — ASPIRIN 325 MG: 325 TABLET ORAL at 09:48

## 2024-09-09 RX ADMIN — SODIUM CHLORIDE, POTASSIUM CHLORIDE, SODIUM LACTATE AND CALCIUM CHLORIDE: 600; 310; 30; 20 INJECTION, SOLUTION INTRAVENOUS at 22:11

## 2024-09-09 RX ADMIN — Medication 10 ML: at 09:52

## 2024-09-09 RX ADMIN — LEVOTHYROXINE SODIUM 25 MCG: 0.03 TABLET ORAL at 20:27

## 2024-09-09 RX ADMIN — SODIUM CHLORIDE: 9 INJECTION, SOLUTION INTRAVENOUS at 17:47

## 2024-09-09 RX ADMIN — METOPROLOL SUCCINATE 25 MG: 25 TABLET, EXTENDED RELEASE ORAL at 09:48

## 2024-09-09 RX ADMIN — RANOLAZINE 500 MG: 500 TABLET, FILM COATED, EXTENDED RELEASE ORAL at 22:23

## 2024-09-09 RX ADMIN — FAMOTIDINE 20 MG: 20 TABLET, FILM COATED ORAL at 09:48

## 2024-09-09 RX ADMIN — INSULIN GLARGINE 20 UNITS: 100 INJECTION, SOLUTION SUBCUTANEOUS at 20:28

## 2024-09-09 RX ADMIN — INSULIN LISPRO 4 UNITS: 100 INJECTION, SOLUTION INTRAVENOUS; SUBCUTANEOUS at 20:32

## 2024-09-09 RX ADMIN — ATORVASTATIN CALCIUM 40 MG: 40 TABLET, FILM COATED ORAL at 20:27

## 2024-09-09 RX ADMIN — SODIUM CHLORIDE, PRESERVATIVE FREE 10 ML: 5 INJECTION INTRAVENOUS at 20:29

## 2024-09-09 RX ADMIN — LISINOPRIL 20 MG: 20 TABLET ORAL at 09:48

## 2024-09-09 RX ADMIN — FAMOTIDINE 20 MG: 20 TABLET, FILM COATED ORAL at 20:28

## 2024-09-09 RX ADMIN — INSULIN LISPRO 2 UNITS: 100 INJECTION, SOLUTION INTRAVENOUS; SUBCUTANEOUS at 18:01

## 2024-09-09 RX ADMIN — INSULIN HUMAN 12 UNITS: 100 INJECTION, SOLUTION PARENTERAL at 22:11

## 2024-09-09 RX ADMIN — Medication 10 MG: at 20:27

## 2024-09-09 NOTE — PRE SEDATION
Sedation Plan  ASA: class 3 - patient with severe systemic disease     Mallampati class: III - soft palate, base of uvula visible.    Sedation plan: local anesthesia and level 2-1: moderate/analgesia (conscious sedation)    Risks, benefits, and alternatives discussed with patient.        Immediate reassessment prior to sedation:  Patient's status reviewed and vital signs assessed; acceptable to perform procedure and proceed to administer sedation as planned.      Brief Pre-Op Note/Sedation Assessment      Gail Stewart  1945  [id number]  3:36 PM    Planned Procedure: Cardiac Catheterization Procedure  Post Procedure Plan: Return to same level of care  Consent: I have discussed with the patient and/or the patient representative the indication, alternatives, and the possible risks and/or complications of the planned procedure and the anesthesia methods. The patient and/or patient representative appear to understand and agree to proceed.        Chief Complaint:   Chest Pain/Pressure      Indications for Cath Procedure:  Presentation:  New Onset Angina <= 2 months  2.  Anginal Classification within 2 weeks:  CCS III - Symptoms with everyday living activities, i.e., moderate limitation  3.  Angina Symptoms Assessment:  Atypical Chest Pain  4.  Heart Failure Class within last 2 weeks:  No symptoms  5.  Cardiovascular Instability:  No    Prior Ischemic Workup/Eval:  Pre-Procedural Medications: Yes: ACE/ARB/ARNI, Aspirin, Beta Blockers, and STATIN  2.   Stress Test Completed?  Yes:  Stress or Imaging Studies Performed (within ANY time period):   Type:  Stress Nuclear  Results:  Positive:  Myocardial Perfusion Defects (Nuclear) Extent of Ischemia:  High Risk (>3% annual death or MI)    Does Patient need surgery?  Cath Valve Surgery:  No    Pre-Procedure Medical History:  Vital Signs:  /70   Pulse 66   Temp 98 °F (36.7 °C) (Oral)   Resp 16   Ht 1.626 m (5' 4\")   Wt 102 kg (224 lb 14.4 oz)   SpO2 99%   BMI  osteoarthrosis of left hip 02/18/2020    Neuropathy        Surgical History:    Past Surgical History:   Procedure Laterality Date    HIP SURGERY      HYSTERECTOMY (CERVIX STATUS UNKNOWN)      JOINT REPLACEMENT      THYROIDECTOMY, PARTIAL Right     TOTAL HIP ARTHROPLASTY Right 10/09/2019    RIGHT LATERAL TOTAL HIP MAKOPLASTY WITH CELLSAVER, FASCIAILIACA BLOCK FOR PAIN CONTROL     ITZEL BINDU  CPT CODE - 43089, 93851 performed by Ajit Rodriguez MD at [facility] OR    TOTAL HIP ARTHROPLASTY Left 02/18/2020    LEFT LATERAL TOTAL HIP MAKOPLASTY WITH CELLSAVER AND FASCIAILIACA BLOCK FOR PAIN CONTROL   ITZEL BINDU  CPT CODE - 02404, 04138 performed by Ajit Rodriguez MD at [facility] OR             Pre-Sedation:  Pre-Sedation Documentation and Exam:  I have assessed the patient and reviewed the H&P on the chart.    Prior History of Anesthesia Complications:   none    Modified Mallampati:  III (soft palate, base of uvula visible)    ASA Classification:  Class 3 - A patient with severe systemic disease that limits activity but is not incapacitating    Coral Scale:  Activity:  2 - Able to move 4 extremities voluntarily on command  Respiration:  2 - Able to breathe deeply and cough freely  Circulation:  2 - BP+/- 20mmHg of normal  Consciousness:  2 - Fully awake  Oxygen Saturation (color):  2 - Able to maintain oxygen saturation >92% on room air    Sedation/Anesthesia Plan:  Guard the patient's safety and welfare.  Minimize physical discomfort and pain.  Minimize negative psychological responses to treatment by providing sedation and analgesia and maximize the potential amnesia.  Patient to meet pre-procedure discharge plan.    Medication Planned:  midazolam intravenously and fentanyl intravenously    Patient is an appropriate candidate for plan of sedation:   yes      Electronically signed by Dennis Loza MD on 9/9/2024 at 3:36 PM

## 2024-09-09 NOTE — ACP (ADVANCE CARE PLANNING)
09/09/24 1320   Encounter Summary   Encounter Overview/Reason Spiritual/Emotional Needs;Advance Care Planning;Initial Encounter   Service Provided For Patient   Referral/Consult From [facility]   Support System Friends/neighbors   Last Encounter    (9/9: volunteer Reji discussed ACP desires of patient, declined assistance completing new docs \"I have documents in [place]\" please contact Spiritual Health if patient changes mind.)     Thank you for consulting Spiritual Health    If you would like a 's presence for emotional, spiritual, grief or comfort care,   please dial \"[phone]\" and ask for the  on-call to be paged.    For help with Advanced Care Planning, Power of  for Healthcare or Living Will forms, you may also call us directly:    [phone]) Caleb  [phone]) [place]  [phone]) Outpatient    [facility]

## 2024-09-09 NOTE — PLAN OF CARE
Problem: Safety - Adult  Goal: Free from fall injury  Outcome: Progressing  Note: Bed in low position, wheels locked. Side rails up x2. Instructed to call for assistance before getting out of bed, patient verbalizes understanding. Call light in reach and bed alarm remains activated.

## 2024-09-09 NOTE — PROCEDURES
PROCEDURE NOTE  Date: 9/9/2024   Name: Gail Stewart  YOB: 1945    Procedures    Essentially normal coronary angiogram with preserved left ventricular function.  There is minimal luminal disease of the LAD.    Stress test is a false positive.    Recommendation:    Medical management and non-cardiac CP evaluation

## 2024-09-09 NOTE — PROGRESS NOTES
[facility]   Daily Cardiovascular Progress Note    Admit Date: 9/3/2024    CC:elevated glucose (Patient reports she was sent in today for an elevated glucose.  States her MD stopped her Trulicity due to illness.  MD checked blood work today, called her and told her to come to the emergency department.  )      HPI: Gail Stewart has no complaints today.      Medications/Labs all Reviewed:  Patient Active Problem List   Diagnosis    Right hip pain    Fall on same level from tripping as cause of accidental injury    Primary localized osteoarthritis of right hip    Status post total hip replacement, right    Localized osteoarthrosis of left hip    Status post total replacement of left hip    Unable to ambulate    Acute cystitis without hematuria    Hypothyroidism    HLD (hyperlipidemia)    Hyperglycemia due to diabetes mellitus (HCC)    Chest pain    Elevated troponin       Medications:   sodium chloride flush  5-40 mL IntraVENous 2 times per day    [START ON 9/10/2024] insulin glargine  25 Units SubCUTAneous Daily    metoprolol succinate  25 mg Oral Daily    famotidine  20 mg Oral BID    insulin lispro  0-8 Units SubCUTAneous TID WC    insulin lispro  0-4 Units SubCUTAneous Nightly    insulin glargine  20 Units SubCUTAneous Nightly    melatonin  10 mg Oral Nightly    sodium chloride flush  5-40 mL IntraVENous 2 times per day    aspirin  81 mg Oral Daily    atorvastatin  40 mg Oral Nightly    levothyroxine  25 mcg Oral Daily    lisinopril  20 mg Oral Daily       Infusion Medications:   sodium chloride      sodium chloride      dextrose         Labs:  Lab Results   Component Value Date    WBC 9.9 09/09/2024    HGB 10.7 (L) 09/09/2024    HCT 31.9 (L) 09/09/2024    MCV 96.9 09/09/2024     09/09/2024     Lab Results   Component Value Date    CREATININE 1.0 09/09/2024    BUN 35 (H) 09/09/2024     (L) 09/09/2024    K 4.4 09/09/2024     09/09/2024    CO2 22 09/09/2024     Lab Results

## 2024-09-09 NOTE — PROGRESS NOTES
4 Eyes Skin Assessment     NAME:  Gail Stewart  YOB: 1945  MEDICAL RECORD NUMBER:  [id number]    The patient is being assessed for  Cath Lab Post-Op    I agree that at least one RN has performed a thorough Head to Toe Skin Assessment on the patient. ALL assessment sites listed below have been assessed.      Areas assessed by both nurses:    Head, Face, Ears, Shoulders, Back, Chest, Arms, Elbows, Hands, Sacrum. Buttock, Coccyx, Ischium, Legs. Feet and Heels, and Under Medical Devices         Does the Patient have a Wound? No noted wound(s)       Sebastian Prevention initiated by RN: No  Wound Care Orders initiated by RN: No    Pressure Injury (Stage 3,4, Unstageable, DTI, NWPT, and Complex wounds) if present, place Wound referral order by RN under : No    New Ostomies, if present place, Ostomy referral order under : No     Nurse 1 eSignature: Electronically signed by Diamond Fair RN on 9/9/24 at 6:21 PM EDT    **SHARE this note so that the co-signing nurse can place an eSignature**    Nurse 2 eSignature: Electronically signed by Tri Felix RN on 9/9/24 at 6:26 PM EDT

## 2024-09-09 NOTE — PROGRESS NOTES
Report given to patients nurse. Pt transferred to room. CMU called and monitor is on and verified.  Site looks unremarkable.

## 2024-09-09 NOTE — DISCHARGE INSTR - COC
Continuity of Care Form    Patient Name: Gail Stewart   :  1945  MRN:  [id number]    Admit date:  9/3/2024  Discharge date:  2024    Code Status Order: Full Code   Advance Directives:   Advance Care Flowsheet Documentation             Admitting Physician:  Jess Barrientos MD  PCP: Vicki Berg APRN - CNP    Discharging Nurse: SE  Discharging Hospital Unit/Room#: Cath Pool Room/PL  Discharging Unit Phone Number: [phone]    Emergency Contact:   Extended Emergency Contact Information  Primary Emergency Contact: Elvin Almazan  Home Phone: [phone]  Mobile Phone: [phone]  Relation: Friend  Secondary Emergency Contact: no call,no call  Relation: Brother/Sister  Preferred language: English    Past Surgical History:  Past Surgical History:   Procedure Laterality Date    HIP SURGERY      HYSTERECTOMY (CERVIX STATUS UNKNOWN)      JOINT REPLACEMENT      THYROIDECTOMY, PARTIAL Right     TOTAL HIP ARTHROPLASTY Right 10/09/2019    RIGHT LATERAL TOTAL HIP MAKOPLASTY WITH CELLSAVER, FASCIAILIACA BLOCK FOR PAIN CONTROL     ITZEL BINDU  CPT CODE - 19814, 73139 performed by Ajit Rodriguez MD at [facility] OR    TOTAL HIP ARTHROPLASTY Left 2020    LEFT LATERAL TOTAL HIP MAKOPLASTY WITH CELLSAVER AND FASCIAILIACA BLOCK FOR PAIN CONTROL   ITZEL BINDU  CPT CODE - 26803, 37811 performed by Ajit Rodriguez MD at [facility] OR       Immunization History:   Immunization History   Administered Date(s) Administered    COVID-19, PFIZER PURPLE top, DILUTE for use, (age 12 y+), 30mcg/0.3mL 2021, 2021    Influenza, FLUARIX, FLULAVAL, FLUZONE (age 6 mo+) and AFLURIA, (age 3 y+), Quadv PF, 0.5mL 10/10/2019       Active Problems:  Patient Active Problem List   Diagnosis Code    Right hip pain M25.551    Fall on same level from tripping as cause of accidental injury W01.0XXA    Primary localized osteoarthritis of right hip M16.11    Status post total hip replacement, right Z96.641    Localized osteoarthrosis of

## 2024-09-09 NOTE — PROGRESS NOTES
Hospital Medicine Progress Note      Date of Admission: 9/3/2024  Hospital Day: 7    Chief Admission Complaint:  Hyperglycemia     Subjective:  no new c/o    Presenting Admission History:         79 yo F presented to the ED after her PCP noted BS >500 on routine labwork. PMHx significant for DM2, HTN, Hypothyroidism. She reports she recently stopped taking Trulicity due to concern for side effects. Her most recent HbA1c was >10. She has not yet started on any other agents as she hasn't had her PCP visit to discuss; she was doing pre-visit blood work when the hyperglycemia was noted.       During ED evaluation, she was noted to have mildly elevated but flat Troponins. Upon further questioning, she acknowledged some dyspnea on exertion over the past few weeks. She also endorsed some intermittent chest discomfort, but further questioning she attributes this to a recent shingles episode.     Assessment/Plan:      Current Principal Problem:  Hyperglycemia due to diabetes mellitus (HCC)      Hyperglycemia, poorly controlled DM2: She reports she recently stopped taking Trulicity due to concern for side effects. Her most recent HbA1c was >10. She has not yet started on any other agents yet. Basal bolus Insulin regimen was started here.  After further discussion, she is now agreeable to an Insulin regimen at discharge given severity of hyperglycemia and HbA1c. Maybe a Lantus pen once or twice daily for simplicity.  Continue to titrate insulin she was requiring 18 units of lispro along with 30 units of Lantus in the last 24 hours.     Dyspnea on exertion, possible anginal equivalent: Denies any active chest pain, but does report recent CORTEZ.  NM Stress Testing was abnormal.  Cardiology consulted and appreciated w/ plan for [facility] Monday 9 Sept.      HTN - w/out known CAD and no evidence of active signs/sxs of ischemia/failure. Currently controlled on home meds w/ vitals documented and reviewed.     Troponin elevation - NOT

## 2024-09-09 NOTE — CARE COORDINATION
Writer notes DC order, patient is scheduled for a cath this afternoon, and likely to not return to C5, may DC from Cath if all OK.     Skye Soto RN

## 2024-09-10 LAB
ALBUMIN SERPL-MCNC: 3.5 G/DL (ref 3.4–5)
ANION GAP SERPL CALCULATED.3IONS-SCNC: 13 MMOL/L (ref 3–16)
BUN SERPL-MCNC: 30 MG/DL (ref 7–20)
CALCIUM SERPL-MCNC: 9.1 MG/DL (ref 8.3–10.6)
CHLORIDE SERPL-SCNC: 104 MMOL/L (ref 99–110)
CO2 SERPL-SCNC: 17 MMOL/L (ref 21–32)
CREAT SERPL-MCNC: 1 MG/DL (ref 0.6–1.2)
DEPRECATED RDW RBC AUTO: 13.4 % (ref 12.4–15.4)
GFR SERPLBLD CREATININE-BSD FMLA CKD-EPI: 57 ML/MIN/{1.73_M2}
GLUCOSE BLD-MCNC: 112 MG/DL (ref 70–99)
GLUCOSE BLD-MCNC: 199 MG/DL (ref 70–99)
GLUCOSE BLD-MCNC: 244 MG/DL (ref 70–99)
GLUCOSE BLD-MCNC: 247 MG/DL (ref 70–99)
GLUCOSE SERPL-MCNC: 207 MG/DL (ref 70–99)
HCT VFR BLD AUTO: 34.2 % (ref 36–48)
HGB BLD-MCNC: 10.8 G/DL (ref 12–16)
MCH RBC QN AUTO: 32 PG (ref 26–34)
MCHC RBC AUTO-ENTMCNC: 31.7 G/DL (ref 31–36)
MCV RBC AUTO: 100.8 FL (ref 80–100)
PERFORMED ON: ABNORMAL
PHOSPHATE SERPL-MCNC: 3.8 MG/DL (ref 2.5–4.9)
PLATELET # BLD AUTO: 162 K/UL (ref 135–450)
PMV BLD AUTO: 8.7 FL (ref 5–10.5)
POTASSIUM SERPL-SCNC: 4.6 MMOL/L (ref 3.5–5.1)
RBC # BLD AUTO: 3.39 M/UL (ref 4–5.2)
SODIUM SERPL-SCNC: 134 MMOL/L (ref 136–145)
WBC # BLD AUTO: 8.3 K/UL (ref 4–11)

## 2024-09-10 PROCEDURE — 2580000003 HC RX 258: Performed by: INTERNAL MEDICINE

## 2024-09-10 PROCEDURE — 36415 COLL VENOUS BLD VENIPUNCTURE: CPT

## 2024-09-10 PROCEDURE — 1200000000 HC SEMI PRIVATE

## 2024-09-10 PROCEDURE — 6370000000 HC RX 637 (ALT 250 FOR IP): Performed by: INTERNAL MEDICINE

## 2024-09-10 PROCEDURE — 80069 RENAL FUNCTION PANEL: CPT

## 2024-09-10 PROCEDURE — 85027 COMPLETE CBC AUTOMATED: CPT

## 2024-09-10 PROCEDURE — 99232 SBSQ HOSP IP/OBS MODERATE 35: CPT | Performed by: NURSE PRACTITIONER

## 2024-09-10 RX ADMIN — Medication 10 MG: at 20:55

## 2024-09-10 RX ADMIN — ATORVASTATIN CALCIUM 80 MG: 80 TABLET, FILM COATED ORAL at 20:55

## 2024-09-10 RX ADMIN — RANOLAZINE 500 MG: 500 TABLET, FILM COATED, EXTENDED RELEASE ORAL at 08:15

## 2024-09-10 RX ADMIN — INSULIN LISPRO 2 UNITS: 100 INJECTION, SOLUTION INTRAVENOUS; SUBCUTANEOUS at 08:17

## 2024-09-10 RX ADMIN — ASPIRIN 81 MG 81 MG: 81 TABLET ORAL at 08:16

## 2024-09-10 RX ADMIN — RANOLAZINE 500 MG: 500 TABLET, FILM COATED, EXTENDED RELEASE ORAL at 20:55

## 2024-09-10 RX ADMIN — FAMOTIDINE 20 MG: 20 TABLET, FILM COATED ORAL at 08:16

## 2024-09-10 RX ADMIN — SODIUM CHLORIDE, PRESERVATIVE FREE 10 ML: 5 INJECTION INTRAVENOUS at 08:24

## 2024-09-10 RX ADMIN — INSULIN GLARGINE 35 UNITS: 100 INJECTION, SOLUTION SUBCUTANEOUS at 08:18

## 2024-09-10 RX ADMIN — SODIUM CHLORIDE, PRESERVATIVE FREE 10 ML: 5 INJECTION INTRAVENOUS at 21:01

## 2024-09-10 RX ADMIN — EMPAGLIFLOZIN 10 MG: 10 TABLET, FILM COATED ORAL at 08:16

## 2024-09-10 RX ADMIN — INSULIN LISPRO 17 UNITS: 100 INJECTION, SOLUTION INTRAVENOUS; SUBCUTANEOUS at 17:35

## 2024-09-10 RX ADMIN — Medication 10 ML: at 08:24

## 2024-09-10 RX ADMIN — INSULIN LISPRO 17 UNITS: 100 INJECTION, SOLUTION INTRAVENOUS; SUBCUTANEOUS at 12:29

## 2024-09-10 RX ADMIN — INSULIN LISPRO 17 UNITS: 100 INJECTION, SOLUTION INTRAVENOUS; SUBCUTANEOUS at 08:16

## 2024-09-10 RX ADMIN — LEVOTHYROXINE SODIUM 25 MCG: 0.03 TABLET ORAL at 20:55

## 2024-09-10 RX ADMIN — INSULIN GLARGINE 30 UNITS: 100 INJECTION, SOLUTION SUBCUTANEOUS at 20:55

## 2024-09-10 RX ADMIN — INSULIN LISPRO 2 UNITS: 100 INJECTION, SOLUTION INTRAVENOUS; SUBCUTANEOUS at 12:29

## 2024-09-10 RX ADMIN — FAMOTIDINE 20 MG: 20 TABLET, FILM COATED ORAL at 20:55

## 2024-09-10 RX ADMIN — METOPROLOL SUCCINATE 25 MG: 25 TABLET, EXTENDED RELEASE ORAL at 08:16

## 2024-09-10 RX ADMIN — LISINOPRIL 20 MG: 20 TABLET ORAL at 08:15

## 2024-09-10 ASSESSMENT — PAIN SCALES - GENERAL: PAINLEVEL_OUTOF10: 0

## 2024-09-10 NOTE — PROGRESS NOTES
succinate  25 mg Oral Daily    famotidine  20 mg Oral BID    insulin lispro  0-8 Units SubCUTAneous TID WC    insulin lispro  0-4 Units SubCUTAneous Nightly    melatonin  10 mg Oral Nightly    sodium chloride flush  5-40 mL IntraVENous 2 times per day    aspirin  81 mg Oral Daily    levothyroxine  25 mcg Oral Daily    lisinopril  20 mg Oral Daily      lactated ringers IV soln 75 mL/hr at 09/10/24 0702    sodium chloride      dextrose       hydrALAZINE, perflutren lipid microspheres, sodium chloride flush, sodium chloride, ondansetron **OR** ondansetron, acetaminophen **OR** acetaminophen, polyethylene glycol, nitroGLYCERIN, cyclobenzaprine, glucose, dextrose bolus **OR** dextrose bolus, glucagon (rDNA), dextrose    Lab Data:  CBC:   Recent Labs     09/09/24  0520 09/10/24  0533   WBC 9.9 8.3   HGB 10.7* 10.8*    162     BMP:    Recent Labs     09/08/24  0616 09/09/24  0520 09/10/24  0533   * 135* 134*   K 4.2 4.4 4.6   CO2 25 22 17*   BUN 35* 35* 30*   CREATININE 1.2 1.0 1.0     9/9/24 Cardiac cath:  Essentially normal coronary angiogram with preserved left ventricular function.  There is minimal luminal disease of the LAD.  Stress test is a false positive.     Recommendation:  Medical management and non-cardiac CP evaluation    9/6/24 Lexiscan-Myoview:    Stress Combined Conclusion: The study is positive for mixed myocardial ischemia and infarction. Findings suggest a moderate risk of cardiac events.    Stress Function: Post-stress ejection fraction is 64%. Stress end diastolic volume: 96 mL. Stress end systolic volume: 35 mL. LV mass: 131.0 g.    Perfusion Comments: LV perfusion is abnormal.    Perfusion Defect: There is a moderate severity left ventricular stress perfusion defect that is small to medium in size present in the mid inferoseptal, inferior and apex segment(s) that is partially reversible. The defect appears to be mixed infarction and ischemia.    Perfusion Conclusion: TID ratio is  0.90.     Echo: 9/4/2024    Image quality is technically difficult. Procedure performed with the patient in a supine position due to hip problems.    Left Ventricle: Low normal left ventricular systolic function with a visually estimated EF of 50 - 55%. EF 3D is 52%. Left ventricle size is normal. EDV Index 3D is 85 mL/m2.  ESV Index 3D is 40 mL/m2. Normal wall thickness. Mass index 3D is 104.3 g/m2. Normal wall motion. Grade I diastolic dysfunction with normal LAP.    Aortic Valve: Mild sclerosis of the aortic valve cusps. Mild regurgitation with an eccentrically directed jet and may underestimate severity. No stenosis. AV mean gradient is 8 mmHg. AV peak gradient is 14 mmHg. AV peak velocity is 1.9 m/s.    Pericardium: Trivial pericardial effusion present. No indication of cardiac tamponade.    Telemetry:Sinus rhythm  (Personally reviewed)    Assessment/Plan:    1)  Elevated troponin  -not a type 1 NSTEMI; flat trend  -normal cardiac cath  -false + stress test  -continue ASA, statin, Toprol and lisinopril    2) Diabetes Mellitus  -Rx per Primary and endocrinology    3) Essential HTN  -goal BP < 130/80   -fairly well controlled  -continue medical management    4) Hyperlipidemia  -continue statin    OK to discharge from cardiac standpoint.     Post cardiac cath instructions discussed    Discharge Cardiac meds: ASA 81 mg daily, atorvastatin 80 nightly, Jardiance 10 mg daily, Lisinopril 20 mg daily, Toprol Xl 25 mg daily and Ranexa 500 mg BID.    Thank you for allowing us to participate in Mrs. Stewart's care.        Electronically signed by DIANE M ENZWEILER, APRN - CNP on 9/10/2024 at 10:52 AM

## 2024-09-10 NOTE — CARE COORDINATION
Chart reviewed day 7. Care provided by cards, endocrine and IM. [place] is from [place] [facility]. Pt reports being IPTA and that her friends help her and drive places. Pt had a cath yesterday that was clean. Endocrine saw her yesterday and has made some changes to her diabetic meds. Will continue to follow course for needs. Zeina Meza RN

## 2024-09-10 NOTE — PLAN OF CARE
Problem: Skin/Tissue Integrity - Adult  Goal: Skin integrity remains intact  Outcome: Progressing     Problem: Genitourinary - Adult  Goal: Absence of urinary retention  Outcome: Progressing     Problem: Pain  Goal: Verbalizes/displays adequate comfort level or baseline comfort level  Outcome: Progressing     Problem: Chronic Conditions and Co-morbidities  Goal: Patient's chronic conditions and co-morbidity symptoms are monitored and maintained or improved  Outcome: Progressing     Problem: Musculoskeletal - Adult  Goal: Return mobility to safest level of function  Outcome: Progressing     Problem: Cardiovascular - Adult  Goal: Absence of cardiac dysrhythmias or at baseline  Outcome: Progressing     Problem: Cardiovascular - Adult  Goal: Maintains optimal cardiac output and hemodynamic stability  Outcome: Progressing     Problem: Metabolic/Fluid and Electrolytes - Adult  Goal: Glucose maintained within prescribed range  Outcome: Progressing

## 2024-09-10 NOTE — CONSULTS
Consult PerfectServed/called to Cardiology (Marie) on 09/04/24 at 10:44 AM Jessica Lucero  
Consult Placed     Who: Kuldeep Maxwell  Date: 09/09/2024  Time: 1903     Electronically signed by Margie Manzano RN on 9/9/2024 at 7:03 PM     
Endocrinology Consult  Thanks for calling!    A pleasant 78 y.o. Cauc woman was admitted for uncontrolled type 2 DM and an abnormal cardiac stress test, has had coronary angiography today.    Pres Illness: The patient was trying to control hyperglycemia with Trulicity injections once weekly and diet but she was unable to take the Trulicity owing to nausea and vomiting.  When she stopped taking the Trulicity her blood sugar was up to 552 mg/dl.  She also has been having chest pain and dyspnea on exertion; however, her coronary angiography shows near normal coronary arteries apart from minimal LAD stenosis; hence her cardiac stress test is considered false positive.  Her HS troponin has been 20 to 21 (nml < 14ng/L).  Her blood sugars today are 284 to 273 mg/dl even though she was not eating for the coronary angiogram.  She has been taking Lantus 25 units in AM and 20 units in PM and sliding scale insulin lis pro medium sliding scale with meals. She takes pepcid for GERD and says her weight has been stable at 224 lbs (BMI 38.6).  Her HbA1c is 10.9%.    Past Hx ROS:  Neuro: Denies diabetic neuropathy, no stroke history, has eyes checked regularly, no retinopathy. Neg brain MRI 2021 except atrophy and probable microvascular ischemia.  Resp: Neg, nonsmoker  CV: Chest pain and inferoseptal and apical mixed fibrosis and ischemia per Lexiscan stress on 9-4-2024 but negative coronary angiography, pro BNP 93 normal and LV function normal on coronary angio on 9-9-2024.  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  Endocrine: Type 2 DM not sure duration, at least a year, uncontrolled on diet and Trulicity, considering Jardiance but she has not used.She had left hemithyroidectomy by Dr. Graham of ENT for dysphagia which improved after the procedure; she takes Synthroid 25 mcg daily. Takeks atorvastatin 20 mg for 
daily  Lipitor 20 daily    ALLERGIES  Amoxicillin and Norco [hydrocodone-acetaminophen]     REVIEW OF SYSTEMS  14 point ROS done and negative other than HPI      PHYSICAL EXAMINATION    Vitals:    09/04/24 1154   BP: (!) 172/94   Pulse: 79   Resp: 12   Temp: 97.8 °F (36.6 °C)   SpO2: 97%    Weight - Scale: 103.2 kg (227 lb 9.6 oz)       General appearance - alert, cooperative, no distress, appears stated age  Neck - Supple, symmetrical, trachea midline, no adenopathy, thyroid: not enlarged, symmetric, no tenderness/mass/nodules, no carotid bruit or JVD  Lungs - Clear to auscultation bilaterally, respirations unlabored  Chest wall - No tenderness or deformity  Heart - Regular rate and rhythm, S1, S2 normal, no murmur, no rub or gallop  Extremities - Extremities normal, atraumatic, no cyanosis or edema  Pulses - 2+ and symmetric upper and lower extremities       LABS  Lab Results   Component Value Date/Time    WBC 8.1 09/04/2024 06:14 AM    RBC 3.25 09/04/2024 06:14 AM    HGB 10.5 09/04/2024 06:14 AM    HCT 31.2 09/04/2024 06:14 AM    MCV 96.1 09/04/2024 06:14 AM    RDW 13.2 09/04/2024 06:14 AM     09/04/2024 06:14 AM     Lab Results   Component Value Date/Time     09/04/2024 06:14 AM    K 3.8 09/04/2024 06:14 AM     09/04/2024 06:14 AM    CO2 23 09/04/2024 06:14 AM    BUN 17 09/04/2024 06:14 AM    CREATININE 0.9 09/04/2024 06:14 AM    GFRAA >60 07/15/2021 05:14 PM    AGRATIO 1.6 09/04/2024 06:14 AM    LABGLOM 65 09/04/2024 06:14 AM    GLUCOSE 335 09/04/2024 06:14 AM    CALCIUM 9.2 09/04/2024 06:14 AM    BILITOT 0.3 09/04/2024 06:14 AM    ALKPHOS 65 09/04/2024 06:14 AM    AST 23 09/04/2024 06:14 AM    ALT 25 09/04/2024 06:14 AM     No results found for: \"PTINR\"  Lab Results   Component Value Date    CKTOTAL 48 07/15/2021    TROPONINI <0.01 07/15/2021     No results found for: \"LDL\", \"LDLDIRECT\"        CARDIAC STUDIES    EKG: Sinus rhythm, left axis deviation.  LVH.  Nonspecific ST

## 2024-09-11 VITALS
RESPIRATION RATE: 17 BRPM | BODY MASS INDEX: 38.58 KG/M2 | SYSTOLIC BLOOD PRESSURE: 124 MMHG | TEMPERATURE: 97.7 F | HEART RATE: 63 BPM | OXYGEN SATURATION: 97 % | WEIGHT: 226 LBS | HEIGHT: 64 IN | DIASTOLIC BLOOD PRESSURE: 64 MMHG

## 2024-09-11 LAB
ALBUMIN SERPL-MCNC: 3.7 G/DL (ref 3.4–5)
ANION GAP SERPL CALCULATED.3IONS-SCNC: 10 MMOL/L (ref 3–16)
BUN SERPL-MCNC: 36 MG/DL (ref 7–20)
CALCIUM SERPL-MCNC: 9.2 MG/DL (ref 8.3–10.6)
CHLORIDE SERPL-SCNC: 104 MMOL/L (ref 99–110)
CO2 SERPL-SCNC: 22 MMOL/L (ref 21–32)
CREAT SERPL-MCNC: 1.1 MG/DL (ref 0.6–1.2)
DEPRECATED RDW RBC AUTO: 13.1 % (ref 12.4–15.4)
GFR SERPLBLD CREATININE-BSD FMLA CKD-EPI: 51 ML/MIN/{1.73_M2}
GLUCOSE BLD-MCNC: 181 MG/DL (ref 70–99)
GLUCOSE BLD-MCNC: 212 MG/DL (ref 70–99)
GLUCOSE SERPL-MCNC: 186 MG/DL (ref 70–99)
HCT VFR BLD AUTO: 32.2 % (ref 36–48)
HGB BLD-MCNC: 10.8 G/DL (ref 12–16)
MCH RBC QN AUTO: 32.6 PG (ref 26–34)
MCHC RBC AUTO-ENTMCNC: 33.6 G/DL (ref 31–36)
MCV RBC AUTO: 96.8 FL (ref 80–100)
PERFORMED ON: ABNORMAL
PERFORMED ON: ABNORMAL
PHOSPHATE SERPL-MCNC: 4.1 MG/DL (ref 2.5–4.9)
PLATELET # BLD AUTO: 174 K/UL (ref 135–450)
PMV BLD AUTO: 9.2 FL (ref 5–10.5)
POTASSIUM SERPL-SCNC: 4.3 MMOL/L (ref 3.5–5.1)
RBC # BLD AUTO: 3.33 M/UL (ref 4–5.2)
SODIUM SERPL-SCNC: 136 MMOL/L (ref 136–145)
WBC # BLD AUTO: 8.8 K/UL (ref 4–11)

## 2024-09-11 PROCEDURE — 36415 COLL VENOUS BLD VENIPUNCTURE: CPT

## 2024-09-11 PROCEDURE — 6370000000 HC RX 637 (ALT 250 FOR IP): Performed by: INTERNAL MEDICINE

## 2024-09-11 PROCEDURE — 2580000003 HC RX 258: Performed by: INTERNAL MEDICINE

## 2024-09-11 PROCEDURE — 85027 COMPLETE CBC AUTOMATED: CPT

## 2024-09-11 PROCEDURE — 80069 RENAL FUNCTION PANEL: CPT

## 2024-09-11 RX ORDER — RANOLAZINE 500 MG/1
500 TABLET, EXTENDED RELEASE ORAL 2 TIMES DAILY
Qty: 60 TABLET | Refills: 0 | Status: SHIPPED | OUTPATIENT
Start: 2024-09-11 | End: 2024-10-11

## 2024-09-11 RX ORDER — FAMOTIDINE 20 MG/1
20 TABLET, FILM COATED ORAL DAILY
Status: DISCONTINUED | OUTPATIENT
Start: 2024-09-12 | End: 2024-09-11 | Stop reason: HOSPADM

## 2024-09-11 RX ORDER — FAMOTIDINE 20 MG/1
20 TABLET, FILM COATED ORAL DAILY
Qty: 30 TABLET | Refills: 0 | Status: SHIPPED | OUTPATIENT
Start: 2024-09-12 | End: 2024-10-12

## 2024-09-11 RX ORDER — INSULIN GLARGINE 100 [IU]/ML
35 INJECTION, SOLUTION SUBCUTANEOUS DAILY
Qty: 10 ML | Refills: 0 | Status: SHIPPED | OUTPATIENT
Start: 2024-09-11 | End: 2024-10-11

## 2024-09-11 RX ORDER — INSULIN LISPRO 100 [IU]/ML
20 INJECTION, SOLUTION INTRAVENOUS; SUBCUTANEOUS
Status: DISCONTINUED | OUTPATIENT
Start: 2024-09-11 | End: 2024-09-11 | Stop reason: HOSPADM

## 2024-09-11 RX ORDER — ATORVASTATIN CALCIUM 80 MG/1
80 TABLET, FILM COATED ORAL NIGHTLY
Qty: 30 TABLET | Refills: 0 | Status: SHIPPED | OUTPATIENT
Start: 2024-09-11 | End: 2024-10-11

## 2024-09-11 RX ORDER — INSULIN LISPRO 100 [IU]/ML
20 INJECTION, SOLUTION INTRAVENOUS; SUBCUTANEOUS
Qty: 20 ML | Refills: 0 | Status: SHIPPED | OUTPATIENT
Start: 2024-09-11 | End: 2024-10-11

## 2024-09-11 RX ORDER — INSULIN GLARGINE 100 [IU]/ML
30 INJECTION, SOLUTION SUBCUTANEOUS NIGHTLY
Qty: 10 ML | Refills: 0 | Status: SHIPPED | OUTPATIENT
Start: 2024-09-11 | End: 2024-10-11

## 2024-09-11 RX ADMIN — METOPROLOL SUCCINATE 25 MG: 25 TABLET, EXTENDED RELEASE ORAL at 09:44

## 2024-09-11 RX ADMIN — RANOLAZINE 500 MG: 500 TABLET, FILM COATED, EXTENDED RELEASE ORAL at 09:45

## 2024-09-11 RX ADMIN — FAMOTIDINE 20 MG: 20 TABLET, FILM COATED ORAL at 09:45

## 2024-09-11 RX ADMIN — SODIUM CHLORIDE, PRESERVATIVE FREE 10 ML: 5 INJECTION INTRAVENOUS at 10:49

## 2024-09-11 RX ADMIN — INSULIN LISPRO 20 UNITS: 100 INJECTION, SOLUTION INTRAVENOUS; SUBCUTANEOUS at 12:24

## 2024-09-11 RX ADMIN — INSULIN LISPRO 17 UNITS: 100 INJECTION, SOLUTION INTRAVENOUS; SUBCUTANEOUS at 08:44

## 2024-09-11 RX ADMIN — ASPIRIN 81 MG 81 MG: 81 TABLET ORAL at 09:45

## 2024-09-11 RX ADMIN — LISINOPRIL 20 MG: 20 TABLET ORAL at 09:44

## 2024-09-11 RX ADMIN — INSULIN GLARGINE 35 UNITS: 100 INJECTION, SOLUTION SUBCUTANEOUS at 08:44

## 2024-09-11 RX ADMIN — EMPAGLIFLOZIN 10 MG: 10 TABLET, FILM COATED ORAL at 09:45

## 2024-09-11 NOTE — CARE COORDINATION
CASE MANAGEMENT DISCHARGE SUMMARY      [facility]    IMM given: 9/11/2024    Transportation:    Family/car: private    Confirmed discharge plan with:     Patient: yes     Family:  yes     Facility/Agency, name:  [facility] can pull orders from [facility]     RN, name: Jackie Meza RN

## 2024-09-11 NOTE — PROGRESS NOTES
Patient requesting education and demonstration with using insulin pen. Patients prescriptions were sent to outside pharmacy. This RN notified MD through perfect serve and asked orders to be resent to in-house pharmacy, still waiting for response. This RN also left a voicemail with [facility] to try to have patient prescriptions transferred back to in house pharmacy.

## 2024-09-11 NOTE — CARE COORDINATION
Pt is aware that she has a D/C order. She states she is not ready to D/C. She said the Dr \"just wants her out\" She is concerned about giving herself the correct dose of insulin. She is also concerned about the fact that she was told not to use her right wrist for 5 days due to angiogram. The nurse spoke with Dr. Rocha with endocrinology. He is going to discontinue the sliding scale insulin in order for pt to have a set dose. Her prescription went to [facility]. The nurse is calling to have her prescription moved to our pharmacy in order for us to show her how to give herself insulin. Pt has given herself injections in the past and has also given her  and father. Pt aware of her right to appeal her discharge. I also left a message for Mack () at [facility] to inquire about the services pt has. She states she has a nurse \"Tierney\" who is there to help her. Will continue to follow course for needs. Zeina Meza RN

## 2024-09-11 NOTE — FLOWSHEET NOTE
Patient with discharge orders, IV removed. Patient prescriptions picked up from outpatient pharmacy. In room, educated patient about insulin medication, dosage, administration, and patient completed teach back. Patient paperwork given with visuals for patient insulin care and administration. After receiving insulin pens. Materials were taken out and patient was able to use pen for demonstration to understand insulin pen use. Patient asked questions, and provided verbalization of understanding. Patient will have help from nurse/staff at facility to assist with tomorrows administration per patient. Patient stated no additional concerns, questions, or needs. Patient taken to main entrance. Patient left by private vehicle with all belongings and medications.

## 2024-09-11 NOTE — PLAN OF CARE
Problem: Discharge Planning  Goal: Discharge to home or other facility with appropriate resources  9/11/2024 1156 by Santa Mendez RN  Outcome: Adequate for Discharge  9/11/2024 1136 by Santa Mendez RN  Outcome: Progressing     Problem: Safety - Adult  Goal: Free from fall injury  9/11/2024 1156 by Santa Mendez RN  Outcome: Adequate for Discharge  9/11/2024 1136 by Santa Mendez RN  Outcome: Progressing     Problem: Skin/Tissue Integrity - Adult  Goal: Skin integrity remains intact  9/11/2024 1156 by Santa Mendez RN  Outcome: Adequate for Discharge  9/11/2024 1136 by Santa Mendez RN  Outcome: Progressing     Problem: Genitourinary - Adult  Goal: Absence of urinary retention  9/11/2024 1156 by Santa Mendez RN  Outcome: Adequate for Discharge  9/11/2024 1136 by Santa Mendez RN  Outcome: Progressing     Problem: Infection - Adult  Goal: Absence of infection at discharge  Outcome: Adequate for Discharge  Goal: Absence of infection during hospitalization  Outcome: Adequate for Discharge     Problem: Metabolic/Fluid and Electrolytes - Adult  Goal: Electrolytes maintained within normal limits  9/11/2024 1156 by Santa Mendez RN  Outcome: Adequate for Discharge  9/11/2024 1136 by Santa Mendez RN  Outcome: Progressing  Goal: Hemodynamic stability and optimal renal function maintained  9/11/2024 1156 by Santa Mendez RN  Outcome: Adequate for Discharge  9/11/2024 1136 by Santa Mendez RN  Outcome: Progressing  Goal: Glucose maintained within prescribed range  9/11/2024 1156 by Santa Mendez RN  Outcome: Adequate for Discharge  9/11/2024 1136 by Santa Mendez RN  Outcome: Progressing     Problem: Chronic Conditions and Co-morbidities  Goal: Patient's chronic conditions and co-morbidity symptoms are monitored and maintained or improved  9/11/2024 1156 by Santa Mendez RN  Outcome: Adequate for Discharge  9/11/2024 1136 by Santa Mendez RN  Outcome: Progressing      Problem: Pain  Goal: Verbalizes/displays adequate comfort level or baseline comfort level  9/11/2024 1156 by Santa Mendez RN  Outcome: Adequate for Discharge  9/11/2024 1136 by Santa Mendez RN  Outcome: Progressing     Problem: Cardiovascular - Adult  Goal: Maintains optimal cardiac output and hemodynamic stability  9/11/2024 1156 by Santa Mendez RN  Outcome: Adequate for Discharge  9/11/2024 1136 by Santa Mendez RN  Outcome: Progressing  Goal: Absence of cardiac dysrhythmias or at baseline  Outcome: Adequate for Discharge     Problem: Musculoskeletal - Adult  Goal: Return mobility to safest level of function  9/11/2024 1156 by Santa Mendez RN  Outcome: Adequate for Discharge  9/11/2024 1136 by Santa Mendez RN  Outcome: Progressing

## 2024-09-11 NOTE — PROGRESS NOTES
Hospital Medicine Progress Note      Date of Admission: 9/3/2024  Hospital Day: 9    Chief Admission Complaint:  Hyperglycemia     Subjective:  no new c/o    Presenting Admission History:         77 yo F presented to the ED after her PCP noted BS >500 on routine labwork. PMHx significant for DM2, HTN, Hypothyroidism. She reports she recently stopped taking Trulicity due to concern for side effects. Her most recent HbA1c was >10. She has not yet started on any other agents as she hasn't had her PCP visit to discuss; she was doing pre-visit blood work when the hyperglycemia was noted.       During ED evaluation, she was noted to have mildly elevated but flat Troponins. Upon further questioning, she acknowledged some dyspnea on exertion over the past few weeks. She also endorsed some intermittent chest discomfort, but further questioning she attributes this to a recent shingles episode.     Assessment/Plan:      Current Principal Problem:  Abnormal cardiovascular stress test      DM2 - uncontrolled on home antiGlycemics.  Started on Lantus/SSI and follow FSBS for general monitoring and dosing decisions. Monitored closely for hypoglycemic ADR and toxic effects of insulin w/ POCT and hypoglycemic rescue as ordered. Last HbA1c 10.9% dated this admission. Endocrinology consulted and appreciated, w/ insulin titrated and Jardiance started.  FSBS much better controlled.      Dyspnea on exertion, possible anginal equivalent: Denies any active chest pain, but does report recent CORTEZ.  NM Stress Testing was abnormal.  Cardiology consulted and appreciated s/p LHCath Monday 9 Sept w/out occlusive CAD and no complications.      HTN - w/out known CAD and no evidence of active signs/sxs of ischemia/failure. Currently controlled on home meds w/ vitals documented and reviewed.     Troponin elevation - NOT c/w myocardial injury 2nd to      [] STEMI  [] NSTEMI  [] Acute/Chronic Myocardial Injury 2nd to CHF  [] Recent MI (with 4 weeks of  the discharge plan in detail with case mgt including timing/barriers to discharge, need for support services and placement decision   [] Imaging personally reviewed and interpreted, includes:   [] Telemetry monitoring as noted above  [x] Data Review (any 3)  [] Collateral history obtained from:    [x] All available Consultant notes from yesterday/today were reviewed  [x] All current labs were reviewed and interpreted for clinical significance   [x] Appropriate follow-up labs were ordered    Medications:  Personally reviewed in detail in conjunction w/ labs as documented for evidence of drug toxicity.     Infusion Medications    sodium chloride      dextrose       Scheduled Medications    insulin glargine  30 Units SubCUTAneous Nightly    insulin glargine  35 Units SubCUTAneous Daily    insulin lispro  17 Units SubCUTAneous TID WC    empagliflozin  10 mg Oral Daily    ranolazine  500 mg Oral BID    atorvastatin  80 mg Oral Nightly    metoprolol succinate  25 mg Oral Daily    famotidine  20 mg Oral BID    insulin lispro  0-8 Units SubCUTAneous TID WC    insulin lispro  0-4 Units SubCUTAneous Nightly    melatonin  10 mg Oral Nightly    sodium chloride flush  5-40 mL IntraVENous 2 times per day    aspirin  81 mg Oral Daily    levothyroxine  25 mcg Oral Daily    lisinopril  20 mg Oral Daily     PRN Meds: hydrALAZINE, perflutren lipid microspheres, sodium chloride flush, sodium chloride, ondansetron **OR** ondansetron, acetaminophen **OR** acetaminophen, polyethylene glycol, nitroGLYCERIN, cyclobenzaprine, glucose, dextrose bolus **OR** dextrose bolus, glucagon (rDNA), dextrose     Labs:  Personally reviewed and interpreted for clinical significance.     Recent Labs     09/09/24  0520 09/10/24  0533 09/11/24  0543   WBC 9.9 8.3 8.8   HGB 10.7* 10.8* 10.8*   HCT 31.9* 34.2* 32.2*    162 174     Recent Labs     09/09/24  0520 09/10/24  0533 09/11/24  0543   * 134* 136   K 4.4 4.6 4.3    104 104   CO2 22

## 2024-09-11 NOTE — PROGRESS NOTES
Comprehensive Nutrition Assessment    Type and Reason for Visit:  RD Nutrition Re-Screen/LOS    Nutrition Recommendations/Plan:   Adjust PO diet to 4 CHO/Meal    Handouts added to d/c instructions: Diabetes diet meal planning: general info, Hyperglycemia: general info, and Hypoglycemia (includes how to treat a low blood sugar).         Malnutrition Assessment:  Malnutrition Status:  No malnutrition (09/11/24 1349)    Context:  Acute Illness     Findings of the 6 clinical characteristics of malnutrition:  Energy Intake:  No significant decrease in energy intake  Weight Loss:  No significant weight loss     Body Fat Loss:  Unable to assess     Muscle Mass Loss:  Unable to assess    Fluid Accumulation:  No significant fluid accumulation     Strength:  Not Performed    Nutrition Assessment:    78 y.o. female admitted for hyperglycemia, noted pt stopped DM medications d/t side effects and had not restarted any. A1c 10.9% at admit-- new medications started. Also s/p (9/9) hert cath and angiography. PMH significant for T2DM, HLD, CKD-3. Noted plans for d/c today however per CM note pt does not feel ready. Per chart review, no wt hx to assess, however pt fairly wt stable since admit +/-5#. Per I/O, intakes documented as >75% of meals x2. No hx dysphagia. Will restart CHO restriction.      Nutrition Related Findings:    Labs: BUN 36, GFR 51, -244 mg/dl x24hrs.  Meds: Jardiance, pepcid, lantus, SSI, levothyroxine, ranexa.    Edema:  trace b/l LE   LBM:  09/09/24, GI Symptoms:  (none) Wound Type: None         Current Nutrition Intake & Therapies:    Average Meal Intake: %  Average Supplements Intake: None Ordered  ADULT DIET; Regular; 4 carb choices (60 gm/meal)    Anthropometric Measures:  Height: 162.6 cm (5' 4.02\")  Ideal Body Weight (IBW): 120 lbs (55 kg)    Admission Body Weight: 103.2 kg (227 lb 8.2 oz)  Current Body Weight: 102.5 kg (225 lb 15.5 oz), 188.3 % IBW. Weight Source: Standing Scale  Current

## 2024-09-11 NOTE — PLAN OF CARE
Problem: Discharge Planning  Goal: Discharge to home or other facility with appropriate resources  9/10/2024 2142 by Melva Bullock RN  Outcome: Progressing  Flowsheets (Taken 9/10/2024 2101)  Discharge to home or other facility with appropriate resources: Identify barriers to discharge with patient and caregiver     Problem: Safety - Adult  Goal: Free from fall injury  Outcome: Progressing     Problem: Skin/Tissue Integrity - Adult  Goal: Skin integrity remains intact  Outcome: Progressing  Flowsheets  Taken 9/10/2024 2141  Skin Integrity Remains Intact: Monitor for areas of redness and/or skin breakdown  Taken 9/10/2024 2101  Skin Integrity Remains Intact: Monitor for areas of redness and/or skin breakdown     Problem: Genitourinary - Adult  Goal: Absence of urinary retention  Outcome: Progressing  Flowsheets (Taken 9/10/2024 2101)  Absence of urinary retention: Assess patient’s ability to void and empty bladder     Problem: Infection - Adult  Goal: Absence of infection at discharge  Outcome: Progressing  Flowsheets (Taken 9/10/2024 2101)  Absence of infection at discharge: Assess and monitor for signs and symptoms of infection  Goal: Absence of infection during hospitalization  Outcome: Progressing  Flowsheets (Taken 9/10/2024 2101)  Absence of infection during hospitalization: Assess and monitor for signs and symptoms of infection     Problem: Metabolic/Fluid and Electrolytes - Adult  Goal: Electrolytes maintained within normal limits  Outcome: Progressing  Flowsheets (Taken 9/10/2024 2101)  Electrolytes maintained within normal limits: Monitor labs and assess patient for signs and symptoms of electrolyte imbalances  Goal: Hemodynamic stability and optimal renal function maintained  Outcome: Progressing  Flowsheets (Taken 9/10/2024 2101)  Hemodynamic stability and optimal renal function maintained: Monitor labs and assess for signs and symptoms of volume excess or deficit  Goal: Glucose maintained within

## 2024-09-11 NOTE — PLAN OF CARE
Problem: Discharge Planning  Goal: Discharge to home or other facility with appropriate resources  9/11/2024 1136 by Satna Mendez RN  Outcome: Progressing  9/10/2024 2142 by Melva Bullock RN  Outcome: Progressing  Flowsheets (Taken 9/10/2024 2101)  Discharge to home or other facility with appropriate resources: Identify barriers to discharge with patient and caregiver     Problem: Safety - Adult  Goal: Free from fall injury  9/11/2024 1136 by Santa Mendez RN  Outcome: Progressing  9/10/2024 2142 by Melva Bullock RN  Outcome: Progressing     Problem: Skin/Tissue Integrity - Adult  Goal: Skin integrity remains intact  9/11/2024 1136 by Santa Mendez RN  Outcome: Progressing  9/10/2024 2142 by Melva Bullock RN  Outcome: Progressing  Flowsheets  Taken 9/10/2024 2141  Skin Integrity Remains Intact: Monitor for areas of redness and/or skin breakdown  Taken 9/10/2024 2101  Skin Integrity Remains Intact: Monitor for areas of redness and/or skin breakdown     Problem: Genitourinary - Adult  Goal: Absence of urinary retention  9/11/2024 1136 by Santa Mendez RN  Outcome: Progressing  9/10/2024 2142 by Melva Bullock RN  Outcome: Progressing  Flowsheets (Taken 9/10/2024 2101)  Absence of urinary retention: Assess patient’s ability to void and empty bladder     Problem: Infection - Adult  Goal: Absence of infection at discharge  9/10/2024 2142 by Melva Bullock RN  Outcome: Progressing  Flowsheets (Taken 9/10/2024 2101)  Absence of infection at discharge: Assess and monitor for signs and symptoms of infection  Goal: Absence of infection during hospitalization  9/10/2024 2142 by Melva Bullock RN  Outcome: Progressing  Flowsheets (Taken 9/10/2024 2101)  Absence of infection during hospitalization: Assess and monitor for signs and symptoms of infection     Problem: Metabolic/Fluid and Electrolytes - Adult  Goal: Electrolytes maintained within normal limits  9/11/2024 1136 by Santa Mendez RN  Outcome:

## 2024-09-11 NOTE — PROGRESS NOTES
This RN as well as case management worked together to identify what patient felt she needed more education on. Patient states feeling comfortable with giving insulin injections but concern over using the sliding scale.     This RN spoke with Pretorius MD regarding insulin orders, and patient concerns. MD gave verbal order to DC sliding scale insulin and increase Lispro from 17 units TID to 20 units TID.

## 2024-09-12 NOTE — DISCHARGE SUMMARY
Hospital Medicine Discharge Summary    Patient: Gail Stewart   : 1945     Admit Date: 9/3/2024   Discharge Date: 2024    Disposition:  [x]Home   [x]HHC  []SNF  []Acute Rehab  []LTAC  []Hospice  Code status:  [x]Full  []DNR/CCA  []Limited (DNR/CCA with Do Not Intubate)  []DNRCC  Condition at Discharge: Stable  Primary Care Provider: Vicki Berg APRN - CNP    Admitting Provider: Jess Barrientos MD  Discharge Provider: Lewis Garcia MD     Discharge Diagnoses:      Active Hospital Problems    Diagnosis     Abnormal cardiovascular stress test [R94.39]     Chest pain [R07.9]        Presenting Admission History:        77 yo F presented to the ED after her PCP noted BS >500 on routine labwork. PMHx significant for DM2, HTN, Hypothyroidism. She reports she recently stopped taking Trulicity due to concern for side effects. Her most recent HbA1c was >10. She has not yet started on any other agents as she hasn't had her PCP visit to discuss; she was doing pre-visit blood work when the hyperglycemia was noted.       During ED evaluation, she was noted to have mildly elevated but flat Troponins. Upon further questioning, she acknowledged some dyspnea on exertion over the past few weeks. She also endorsed some intermittent chest discomfort, but further questioning she attributes this to a recent shingles episode.         Assessment/Plan:        DM2 - uncontrolled on home antiGlycemics.  Started on Lantus/SSI and follow FSBS for general monitoring and dosing decisions. Monitored closely for hypoglycemic ADR and toxic effects of insulin w/ POCT and hypoglycemic rescue as ordered. Last HbA1c 10.9% dated this admission. Endocrinology consulted and appreciated, w/ insulin titrated and Jardiance started.  FSBS much better controlled.      Dyspnea on exertion, possible anginal equivalent: Denies any active chest pain, but does report recent CORTEZ.  NM Stress Testing was abnormal.  Cardiology consulted and  Care Provider Vicki Berg APRN - CNP in the next 1-2 weeks.  Endocrinology as arranged.      The patient was seen and examined on day of discharge and this discharge summary is in conjunction with any daily progress note from day of discharge. Time spent on discharge: 33 minutes in the examination, evaluation, counseling and review of medications and discharge plan.    ------------------------------------------------------------------------------------------------------------------------------------------------------    Discharge Medications:   Discharge Medication List as of 9/11/2024  2:52 PM        START taking these medications    Details   empagliflozin (JARDIANCE) 10 MG tablet Take 1 tablet by mouth daily, Disp-30 tablet, R-0Print      insulin lispro (HUMALOG,ADMELOG) 100 UNIT/ML SOLN injection vial Inject 20 Units into the skin 3 times daily (with meals), Disp-20 mL, R-0Print      famotidine (PEPCID) 20 MG tablet Take 1 tablet by mouth daily, Disp-30 tablet, R-0Print           Discharge Medication List as of 9/11/2024  2:52 PM        CONTINUE these medications which have CHANGED    Details   atorvastatin (LIPITOR) 80 MG tablet Take 1 tablet by mouth nightly, Disp-30 tablet, R-0Print      !! insulin glargine (LANTUS) 100 UNIT/ML injection vial Inject 30 Units into the skin nightly, Disp-10 mL, R-0Print      !! insulin glargine (LANTUS) 100 UNIT/ML injection vial Inject 35 Units into the skin daily, Disp-10 mL, R-0Print      ranolazine (RANEXA) 500 MG extended release tablet Take 1 tablet by mouth 2 times daily, Disp-60 tablet, R-0Print      !! insulin glargine (LANTUS) 100 UNIT/ML injection vial Inject 25 Units into the skin daily, Disp-10 mL, R-0Print       !! - Potential duplicate medications found. Please discuss with provider.        Discharge Medication List as of 9/11/2024  2:52 PM        CONTINUE these medications which have NOT CHANGED    Details   cetirizine (ZYRTEC) 5 MG tablet Take 1 tablet by

## (undated) DEVICE — COVER,TABLE,HEAVY DUTY,50"X90",STRL: Brand: MEDLINE

## (undated) DEVICE — GLOVE ORANGE PI 7 1/2   MSG9075

## (undated) DEVICE — RADIFOCUS OPTITORQUE ANGIOGRAPHIC CATHETER: Brand: OPTITORQUE

## (undated) DEVICE — SUTURE MCRYL SZ 2-0 L18IN ABSRB VLT L36MM CT-1 1/2 CIR Y739D

## (undated) DEVICE — BIT DRL L170MM DIA2MM FOR HUM GLEN PREP INSTR

## (undated) DEVICE — GLOVE ORANGE PI 8 1/2   MSG9085

## (undated) DEVICE — INTENT TO BE USED WITH SUTURE MATERIAL FOR TISSUE CLOSURE: Brand: RICHARD-ALLAN® NEEDLE 3/8 CIRCLE TROCAR

## (undated) DEVICE — SKIN AFFIX SURG ADHESIVE 72/CS 0.55ML: Brand: MEDLINE

## (undated) DEVICE — ADHESIVE SKIN CLSR 0.7ML TOP DERMBND ADV

## (undated) DEVICE — PENCIL SMK EVAC ALL IN 1 DSGN ENH VISIBILITY IMPROVED AIR

## (undated) DEVICE — SOLUTION IV IRRIG POUR BRL 0.9% SODIUM CHL 2F7124

## (undated) DEVICE — MARKER RAD 3.5MM HEX CKPT STEREOTAXIC IMAG LESION LOC FOR

## (undated) DEVICE — SMARTGOWN SURGICAL GOWN, XL: Brand: CONVERTORS

## (undated) DEVICE — 35 ML SYRINGE LUER-LOCK TIP: Brand: MONOJECT

## (undated) DEVICE — SOLUTION IRRIG 2000ML 0.9% SOD CHL USP UROMATIC PLAS CONT

## (undated) DEVICE — PEEL-AWAY TOGA, 2X LARGE: Brand: FLYTE

## (undated) DEVICE — BIT DRILL SINGLE USE

## (undated) DEVICE — DRESSING FOAM W4XL4IN SIL FACE BORD ADH PD SUP ABSRB COR

## (undated) DEVICE — GLOVE ORANGE PI 8   MSG9080

## (undated) DEVICE — CATH LAB PACK: Brand: MEDLINE INDUSTRIES, INC.

## (undated) DEVICE — KIT DRP FOR RIO ROBOTIC ARM ASST SYS

## (undated) DEVICE — KIT INT FIX FEM TIB CKPT MAKOPLASTY

## (undated) DEVICE — SUTURE ETHBND EXCEL SZ 5 L30IN NONABSORBABLE GRN L40MM V-37 MB66G

## (undated) DEVICE — PIN BNE FIX TEMP L140MM DIA4MM MAKO

## (undated) DEVICE — Device

## (undated) DEVICE — STERILE PVP: Brand: MEDLINE INDUSTRIES, INC.

## (undated) DEVICE — GLIDESHEATH SLENDER STAINLESS STEEL KIT: Brand: GLIDESHEATH SLENDER

## (undated) DEVICE — Z CONVERTED USE 2271043 CONTAINER SPEC COLL 4OZ SCR ON LID PEEL PCH

## (undated) DEVICE — DRESSING FOAM W4XL10IN SIL RECT ADH WTRPRF FLM BK W/ BORD

## (undated) DEVICE — SOLUTION IV IRRIG WATER 1000ML POUR BRL 2F7114

## (undated) DEVICE — DRILL BIT 3.2MM (1/8'') X 128.0MM

## (undated) DEVICE — KIT TRK HIP PROC VIZADISC

## (undated) DEVICE — LINER ACET SZ E ID36MM THK5.9MM 10DEG X3 FOR 54-56MM
Type: IMPLANTABLE DEVICE | Site: HIP | Status: NON-FUNCTIONAL
Removed: 2020-02-18

## (undated) DEVICE — 3M™ TEGADERM™ TRANSPARENT FILM DRESSING FRAME STYLE, 1624W, 2-3/8 IN X 2-3/4 IN (6 CM X 7 CM), 100/CT 4CT/CASE: Brand: 3M™ TEGADERM™

## (undated) DEVICE — OPTIFOAM GENTLE SA, POSTOP, 4X10: Brand: MEDLINE

## (undated) DEVICE — TOWEL,OR,DSP,ST,BLUE,STD,6/PK,12PK/CS: Brand: MEDLINE

## (undated) DEVICE — GLOVE SURG SZ 65 THK91MIL LTX FREE SYN POLYISOPRENE

## (undated) DEVICE — GUIDEWIRE VASC L150CM DIA0.035IN FLX END L7CM J 3MM PTFE

## (undated) DEVICE — SMARTGOWN SURGICAL GOWN, 2XL: Brand: CONVERTORS

## (undated) DEVICE — SOLUTION IV HEPARIN SODIUM SODIUM CHL 0.9% 500 ML INJ VIAFLX

## (undated) DEVICE — HANDPIECE SET WITH HIGH FLOW TIP AND SUCTION TUBE: Brand: INTERPULSE

## (undated) DEVICE — Z DISCONTINUED USE 2429233 DRESSING FOAM W10XL10CM 5 LAYR SELF ADH VERSATILE SAFETAC

## (undated) DEVICE — T4 HOOD

## (undated) DEVICE — TR BAND RADIAL ARTERY COMPRESSION DEVICE: Brand: TR BAND